# Patient Record
Sex: FEMALE | Race: WHITE | Employment: FULL TIME | URBAN - METROPOLITAN AREA
[De-identification: names, ages, dates, MRNs, and addresses within clinical notes are randomized per-mention and may not be internally consistent; named-entity substitution may affect disease eponyms.]

---

## 2017-01-19 ENCOUNTER — TELEPHONE (OUTPATIENT)
Dept: ENDOCRINOLOGY | Facility: CLINIC | Age: 57
End: 2017-01-19

## 2017-01-19 DIAGNOSIS — E11.9 TYPE 2 DIABETES MELLITUS (H): Primary | ICD-10-CM

## 2017-01-19 NOTE — TELEPHONE ENCOUNTER
[facility] Call Center    Phone Message    Name of Caller: Lorrie    Phone Number: Cell number on file:    Telephone Information:   Mobile [phone]       Best time to return call: any    May a detailed message be left on voicemail: yes    Relation to patient: Self    Reason for Call: Other: Patient is calling stating that she needs a refill on her Accucheck Marisela test strips. Patient states she has test strips but they  2016. Patient is requesting new test strips to be sent to [facility]. Please advise,     Action Taken: Message routed to:  Adult Clinics: Endocrinology p [phone]

## 2017-01-19 NOTE — TELEPHONE ENCOUNTER
Confirmed with patient 2-3 times daily testing.     Updated prescription sent to pharmacy.      Janet Pope RN  Endocrine Care Coordinator  [facility]

## 2017-01-23 ENCOUNTER — OFFICE VISIT (OUTPATIENT)
Dept: ENDOCRINOLOGY | Facility: CLINIC | Age: 57
End: 2017-01-23
Payer: COMMERCIAL

## 2017-01-23 VITALS
HEIGHT: 61 IN | HEART RATE: 114 BPM | SYSTOLIC BLOOD PRESSURE: 137 MMHG | WEIGHT: 129.85 LBS | BODY MASS INDEX: 24.52 KG/M2 | DIASTOLIC BLOOD PRESSURE: 89 MMHG

## 2017-01-23 DIAGNOSIS — E11.3299 TYPE 2 DIABETES MELLITUS WITH MILD NONPROLIFERATIVE RETINOPATHY WITHOUT MACULAR EDEMA, WITHOUT LONG-TERM CURRENT USE OF INSULIN, UNSPECIFIED LATERALITY (H): Primary | ICD-10-CM

## 2017-01-23 LAB — HBA1C MFR BLD: 8.4 % (ref 0–5.7)

## 2017-01-23 PROCEDURE — 99214 OFFICE O/P EST MOD 30 MIN: CPT | Performed by: INTERNAL MEDICINE

## 2017-01-23 PROCEDURE — 83036 HEMOGLOBIN GLYCOSYLATED A1C: CPT | Performed by: INTERNAL MEDICINE

## 2017-01-23 PROCEDURE — 36415 COLL VENOUS BLD VENIPUNCTURE: CPT | Performed by: INTERNAL MEDICINE

## 2017-01-23 RX ORDER — PHENTERMINE HYDROCHLORIDE 37.5 MG/1
18.75 TABLET ORAL
Qty: 30 TABLET | Refills: 2 | Status: SHIPPED | OUTPATIENT
Start: 2017-01-23 | End: 2017-01-23

## 2017-01-23 RX ORDER — PHENTERMINE HYDROCHLORIDE 37.5 MG/1
18.75 TABLET ORAL
Qty: 30 TABLET | Refills: 2 | Status: SHIPPED | OUTPATIENT
Start: 2017-01-23 | End: 2017-07-25

## 2017-01-23 NOTE — PATIENT INSTRUCTIONS
- reduce topiramate by 1 pill every week  - please focus on diet  - continue with metformin 100 mg twice a day and Farxiga 10 mg daily  - continue phentermine 18.75 daily  - see me in 3 months    If you have any questions, please do not hesitate to call [facility] at [phone] and ask for Endocrinology clinic.    Sincerely,    Froy Washington MD  Endocrinology        Sending blood sugars to your provider at [place]:  We want to help you with your diabetes management, which often requires frequent adjustments to your therapy. For your convenience, we have several ways to send your blood sugars to your doctor for review.    - Send message directly to your doctor through My Chart.  Please ask the rooming staff if you would like to sign up for My Chart.  This is a fast and confidential way to send your information and communicate directly with your provider.   - Record readings and fax to [phone].  We have a template for you to use for your convenience.  - If you have a Medtronic pump, upload to [employer] and notify your provider of your username and password.   - Stop by the clinic with your meter for download.   - My Chart or call Kinjal Irving, Diabetes Educator at [phone]  - Call the clinic and speak to one of the endocrine nurses to relay information on the telephone.  Miriam Gonzales, or Kiki at [phone].   -    Please call the on-call Endocrinologist at the [facility] for after       hours/weekend needs at [phone] Option #4.    Please note that you do not need to FAST if you are just having an A1C drawn. Please remember to ALWAYS bring your glucose meter with to your appointment. This data is very important for the management of your care.    Thank you!  Your [facility] Diabetes Care Team

## 2017-01-23 NOTE — NURSING NOTE
"Lorrie Blake's goals for this visit include: Follow up Diabetes  She requests these members of her care team be copied on today's visit information: NO    PCP: [facility]    Referring Provider:  No referring provider defined for this encounter.    Chief Complaint   Patient presents with     Diabetes       Initial Ht 1.549 m (5' 1\")  Wt 58.9 kg (129 lb 13.6 oz)  BMI 24.55 kg/m2 Estimated body mass index is 24.55 kg/(m^2) as calculated from the following:    Height as of this encounter: 1.549 m (5' 1\").    Weight as of this encounter: 58.9 kg (129 lb 13.6 oz).  BP completed using cuff size: regular    Do you need any medication refills at today's visit? NO    "

## 2017-01-23 NOTE — MR AVS SNAPSHOT
After Visit Summary   1/23/2017    Lorrie Blake    MRN: [id number]           Patient Information     Date Of Birth          1960        Visit Information        Provider Department      1/23/2017 2:30 PM Froy Washington MD; MG ENDO NURSE [facility]        Today's Diagnoses     Type 2 diabetes mellitus with mild nonproliferative retinopathy without macular edema, without long-term current use of insulin, unspecified laterality    -  1     Type 2 diabetes mellitus with mild nonproliferative diabetic retinopathy without macular edema (H)           Care Instructions    - reduce topiramate by 1 pill every week  - please focus on diet  - continue with metformin 100 mg twice a day and Farxiga 10 mg daily  - continue phentermine 18.75 daily  - see me in 3 months    If you have any questions, please do not hesitate to call [facility] at [phone] and ask for Endocrinology clinic.    Sincerely,    Froy Washington MD  Endocrinology        Sending blood sugars to your provider at [facility]:  We want to help you with your diabetes management, which often requires frequent adjustments to your therapy. For your convenience, we have several ways to send your blood sugars to your doctor for review.    - Send message directly to your doctor through My Chart.  Please ask the rooming staff if you would like to sign up for My Chart.  This is a fast and confidential way to send your information and communicate directly with your provider.   - Record readings and fax to [phone].  We have a template for you to use for your convenience.  - If you have a Medtronic pump, upload to [employer] and notify your provider of your username and password.   - Stop by the clinic with your meter for download.   - My Chart or call Kinjal Irving, Diabetes Educator at [phone]  - Call the clinic and speak to one of the endocrine nurses to relay information on  the telephone.  Miriam Gonzales or Marta at [phone].   -    Please call the on-call Endocrinologist at the [facility] for after       hours/weekend needs at [phone] Option #4.    Please note that you do not need to FAST if you are just having an A1C drawn. Please remember to ALWAYS bring your glucose meter with to your appointment. This data is very important for the management of your care.    Thank you!  Your [place] Diabetes Care Team              Follow-ups after your visit        Your next 10 appointments already scheduled     May 01, 2017  2:50 PM   Return Visit with Froy Washington MD, [facility] END[facility] NURSE   [facility]    [address] [place] [id number] [phone]              Who to contact     If you have questions or need follow up information about today's clinic visit or your schedule please contact [facility] directly at [phone].  Normal or non-critical lab and imaging results will be communicated to you by [employer], letter or phone within 4 business days after the clinic has received the results. If you do not hear from us within 7 days, please contact the clinic through [employer] or phone. If you have a critical or abnormal lab result, we will notify you by phone as soon as possible.  Submit refill requests through [employer] or call your pharmacy and they will forward the refill request to us. Please allow 3 business days for your refill to be completed.          Additional Information About Your Visit        SharePlow Information     [employer] is an electronic gateway that provides easy, online access to your medical records. With SharePlow, you can request a clinic appointment, read your test results, renew a prescription or communicate with your care team.     To sign up for [employer] visit the website at [email or web address]   You will be asked to enter the access code listed below, as well as  "some personal information. Please follow the directions to create your username and password.     Your access code is: [email or web address]  Expires: 2017  3:00 PM     Your access code will  in 90 days. If you need help or a new code, please contact your [facility] or call [phone] for assistance.        Care EveryWhere ID     This is your Care EveryWhere ID. This could be used by other organizations to access your [facility] medical records  [id number]        Your Vitals Were     Pulse Height BMI (Body Mass Index)             114 1.549 m (5' 1\") 24.55 kg/m2          Blood Pressure from Last 3 Encounters:   17 137/89   16 111/79   16 128/88    Weight from Last 3 Encounters:   17 58.9 kg (129 lb 13.6 oz)   16 58.6 kg (129 lb 3 oz)   16 61.859 kg (136 lb 6 oz)              We Performed the Following     Hemoglobin A1c POCT          Today's Medication Changes          These changes are accurate as of: 17  3:00 PM.  If you have any questions, ask your nurse or doctor.               Start taking these medicines.        Dose/Directions    phentermine 37.5 MG tablet   Commonly known as:  ADIPEX-P   Used for:  Type 2 diabetes mellitus with mild nonproliferative retinopathy without macular edema, without long-term current use of insulin, unspecified laterality   Started by:  Froy Washington MD        Dose:  18.75 mg   Take 0.5 tablets (18.75 mg) by mouth every morning (before breakfast)   Quantity:  30 tablet   Refills:  2         Stop taking these medicines if you haven't already. Please contact your care team if you have questions.     topiramate 25 MG tablet   Commonly known as:  TOPAMAX   Stopped by:  Froy Washington MD                Where to get your medicines      Some of these will need a paper prescription and others can be bought over the counter.  Ask your nurse if you have questions.     Bring a paper prescription for " each of these medications    - phentermine 37.5 MG tablet             Primary Care Provider    [facility]       No address on file        Thank you!     Thank you for choosing [facility]  for your care. Our goal is always to provide you with excellent care. Hearing back from our patients is one way we can continue to improve our services. Please take a few minutes to complete the written survey that you may receive in the mail after your visit with us. Thank you!             Your Updated Medication List - Protect others around you: Learn how to safely use, store and throw away your medicines at www.disposemymeds.org.          This list is accurate as of: 1/23/17  3:00 PM.  Always use your most recent med list.                   Brand Name Dispense Instructions for use    ASPIRIN EC PO      Take 81 mg by mouth daily       BIOTIN PO      Take by mouth daily       blood glucose monitoring test strip    no brand specified    100 strip    Use to test blood sugars 3 times daily or as directed. Accu-chek smartview test strips (has Marisela meter)       dapagliflozin 10 MG Tabs tablet    FARXIGA    90 tablet    Take one tablet by mouth daily       LINZESS 290 MCG capsule   Generic drug:  linaclotide      Take 290 mcg by mouth every morning (before breakfast)       melatonin 3 MG tablet      Take 3 mg by mouth nightly as needed for sleep       metFORMIN 500 MG tablet    GLUCOPHAGE    360 tablet    2 tablets with breakfast and 2 tablet with supper       OMEPRAZOLE PO      Take 20 mg by mouth every morning       phentermine 37.5 MG tablet    ADIPEX-P    30 tablet    Take 0.5 tablets (18.75 mg) by mouth every morning (before breakfast)       PROBIOTIC DAILY PO      Take by mouth daily

## 2017-01-23 NOTE — PROGRESS NOTES
Endocrinology Note         Lorrie is a 55 year old female presents today for type 2 diabetes.    HPI  Lorrie Blake is a 55 years old female with hx of uncontrolled type 2 diabetes and retinopathy. Last seen 8/2016.    She has had type 2 diabetes since 1997 and was initially treated with glyburide. She reported that sulfonylurea did not work for her and A1C has been persistenly >14 since 2012. She was also tried on different medications including Byetta, Victoza, Bydureon (it did not improve her glucose and it is expensive for her), insulin (it caused significant hypoglycemia and too expensive). In February 2015, c-peptide was 2.8 and negative insulin antibodies. At that time,she was then started on Farxiga and metformin. She does not want to be on GLP-1 agonist due to financial issue.      Interim history  At last visit, I started her on phentermine 18.75 mg daily to help with weight loss. She said that phentermine worked well in controlled her appetite. Someday, she took whole pill of phentermine instead of half pill. She also takes topiramate 150 mg daily. She continues on Farxiga 10 mg daily and metformin 1000 mg bid. A1C was 7.9 on 8/1/2016. She lost about 7 lbs in 1 month. She does not bring meter today and said that she has not checked it as she should do. She works as a  at [employer] every day.    DM complications:  Retinopathy: exam in 1/2016 -- mild NPDR bilaterally  Nephropathy: negative for microalb in 9/2015  Neuropathy: denied   Aspirin 81 mg daily    Past Medical History  Type 2 diabetes with retinopathy  Schatzki's ring s/p esophageal dilatation    Allergies  Allergies   Allergen Reactions     Ace Inhibitors Itching     Codeine      Medications  Current Outpatient Prescriptions   Medication Sig Dispense Refill     blood glucose monitoring (NO BRAND SPECIFIED) test strip Use to test blood sugars 3 times daily or as directed. Accu-chek smartview test strips (has Marisela meter) 100  strip 3     topiramate (TOPAMAX) 25 MG tablet Take 6 tablets (150 mg) by mouth daily 360 tablet 1     dapagliflozin (FARXIGA) 10 MG TABS tablet Take one tablet by mouth daily 90 tablet 3     phentermine (ADIPEX-P) 37.5 MG tablet Take 0.5 tablets (18.75 mg) by mouth every morning (before breakfast) 30 tablet 2     metFORMIN (GLUCOPHAGE) 500 MG tablet 2 tablets with breakfast and 2 tablet with supper 360 tablet 3     Probiotic Product (PROBIOTIC DAILY PO) Take by mouth daily       GARCINIA CAMBOGIA-CHROMIUM PO Take 1,000 mg by mouth       BIOTIN PO Take by mouth daily       melatonin 3 MG tablet Take 3 mg by mouth nightly as needed for sleep       ASPIRIN EC PO Take 81 mg by mouth daily       OMEPRAZOLE PO Take 20 mg by mouth every morning       Family History  Type 2 diabetes in father, mother and all siblings    Social History  Social History   Substance Use Topics     Smoking status: Never Smoker      Smokeless tobacco: Not on file     Alcohol Use: Not on file       ROS  Constitutional:lost 7 lbs in 1 months  Eyes: no vision change, diplopia or red eyes   Neck: +schatzki ring  Cardiovascular: no chest pain, palpitations  Respiratory: no dyspnea, cough, shortness of breath or wheezing   GI: no nausea, vomiting, diarrhea or constipation, no abdominal pain   : no change in urine, no dysuria  Musculoskeletal: no joint or muscle pain or swelling   Integumentary: no concerning lesions   Neuro: no loss of strength or sensation, no numbness or tingling, no tremor, no dizziness, no headache   Endo: no polyuria or polydipsia, no temperature intolerance   Heme/Lymph: no concerning bumps, no bleeding problems   Allergy: no environmental allergies   Psych: no depression or anxiety, no sleep problems.    Physical Exam  There were no vitals taken for this visit.  There is no weight on file to calculate BMI.  Constitutional: no distress, comfortable, pleasant   Eyes: anicteric  Musculoskeletal: no edema   Skin:  no jaundice    Neurological: normal gait, no tremor on outstretched hands bilaterally  Psychological: appropriate mood     RESULTS  A1C      7.9   8/1/2016  A1C      7.5   3/30/2016  A1C      8.4   12/28/2015  A1C     11.5   9/28/2015    ENDO DIABETES Latest Ref Rng 4/6/2016   CHOLESTEROL <200 mg/dL 226 (H)   LDL CHOLESTEROL, CALCULATED <100 mg/dL 133 (H)   HDL CHOLESTEROL >49 mg/dL 64   NON HDL CHOLESTEROL <130 mg/dL 162 (H)   TRIGLYCERIDES <150 mg/dL 144   MICROALBUMIN URINE     MICROALBUMIN MG/G CR 0 - 25 mg/g Cr    CREATININE 0.52 - 1.04 mg/dL 0.77     ENDO DIABETES Latest Ref Rng 9/28/2015   MICROALBUMIN URINE  <5   MICROALBUMIN MG/G CR 0 - 25 mg/g Cr Unable to calculate due to low value   CREATININE 0.52 - 1.04 mg/dL 0.75     ASSESSMENT:    Lorrie Blake is a 55 years old female with hx of uncontrolled type 2 diabetes and retinopathy.    1) type 2 diabetes with retinopathy:  A1C was 7.9% on 8/1/16.   - I started her on phentermine 1 month ago and she lost 7 lbs.   - continue metofrmin 1000 mg bid and Farxiga 10 mg daily  - she does not want to do injectable medication.      2) DM complications:  Retinopathy: exam in 1/2016 mild NPDR bilaterally  Nephropathy: negative for microalb in 9/2015  Neuropathy: intermittent - likely from topiramate    3) overweight: due to increased sweet craving. Lost 7 lbs in a month after starting on phentermine  continue topiramate 150 mg daily  Continue phentermine 18.75 mg daily    PLAN:   - continue Farxiga 10 mg and metformin 1000 mg bid  - continue topiramate 150 mg daily   - continue phentermine 18.75 mg daily    - RTC in 3 month    Froy Washington MD  Endocrinology  [phone]

## 2017-01-23 NOTE — PROGRESS NOTES
Endocrinology Note         Lorrie is a 56 year old female presents today for type 2 diabetes.    HPI  Lorrie Blake is a 56 years old female with hx of uncontrolled type 2 diabetes and retinopathy. Last seen 9/2016    She has had type 2 diabetes since 1997 and was initially treated with glyburide. She reported that sulfonylurea did not work for her and A1C has been persistenly >14 since 2012. She was also tried on different medications including Byetta, Victoza, Bydureon (it did not improve her glucose and it is expensive for her), insulin (it caused significant hypoglycemia and too expensive). In February 2015, c-peptide was 2.8 and negative insulin antibodies. At that time,she was then started on Farxiga and metformin. She does not want to be on GLP-1 agonist due to financial issue.      Interim history  She stated that she has had problem with her stomach and constipation. She has had bowel movement every 3-4 days and required Linzess.     She is currently on  Farxiga 10 mg daily and metformin 1000 mg bid. A1C is up to 8.4% today. She has not checked fingerstick regularly. She did check blood sugar over the past week with average glucose of 175 (SD 26). She takes phentermine 18.75 mg daily and topiramate 150 mg daily at night. She is very frustrated today that her diabetes is not controlled no matter what she has done. She stated that she has not eaten much. She does not feel hungry in the morning and does not eat past 6 pm. However, she admitted that she craves sweet particularly apple sauce. She exercises almost every day as she works as a  at [employer].    DM complications:  Retinopathy: exam in 1/2016 -- mild NPDR bilaterally  Nephropathy: negative for microalb in 9/2015  Neuropathy: denied   Aspirin 81 mg daily    Past Medical History  Type 2 diabetes with retinopathy  Schatzki's ring s/p esophageal dilatation    Allergies  Allergies   Allergen Reactions     Ace Inhibitors Itching      Codeine      Medications  Current Outpatient Prescriptions   Medication Sig Dispense Refill     linaclotide (LINZESS) 290 MCG capsule Take 290 mcg by mouth every morning (before breakfast)       blood glucose monitoring (NO BRAND SPECIFIED) test strip Use to test blood sugars 3 times daily or as directed. Accu-chek smartview test strips (has Marisela meter) 100 strip 3     topiramate (TOPAMAX) 25 MG tablet Take 6 tablets (150 mg) by mouth daily 360 tablet 1     dapagliflozin (FARXIGA) 10 MG TABS tablet Take one tablet by mouth daily 90 tablet 3     phentermine (ADIPEX-P) 37.5 MG tablet Take 0.5 tablets (18.75 mg) by mouth every morning (before breakfast) 30 tablet 2     metFORMIN (GLUCOPHAGE) 500 MG tablet 2 tablets with breakfast and 2 tablet with supper 360 tablet 3     Probiotic Product (PROBIOTIC DAILY PO) Take by mouth daily       BIOTIN PO Take by mouth daily       melatonin 3 MG tablet Take 3 mg by mouth nightly as needed for sleep       ASPIRIN EC PO Take 81 mg by mouth daily       OMEPRAZOLE PO Take 20 mg by mouth every morning       Family History  Type 2 diabetes in father, mother and all siblings    Social History  Social History   Substance Use Topics     Smoking status: Never Smoker      Smokeless tobacco: Not on file     Alcohol Use: Not on file       ROS  Constitutional: stable weight  Eyes: no vision change, diplopia or red eyes   Neck: +schatzki ring  Cardiovascular: no chest pain, palpitations  Respiratory: no dyspnea, cough, shortness of breath or wheezing   GI: no nausea, vomiting, diarrhea, + constipation, no abdominal pain   : no change in urine, no dysuria  Musculoskeletal: no joint or muscle pain or swelling   Integumentary: no concerning lesions   Neuro: no loss of strength or sensation, no numbness or tingling, no tremor, no dizziness, no headache   Endo: no polyuria or polydipsia, no temperature intolerance   Heme/Lymph: no concerning bumps, no bleeding problems   Allergy: no environmental  "allergies   Psych: +frustrated    Physical Exam  /89 mmHg  Pulse 114  Ht 1.549 m (5' 1\")  Wt 58.9 kg (129 lb 13.6 oz)  BMI 24.55 kg/m2  Body mass index is 24.55 kg/(m^2).  Constitutional: no distress, comfortable, pleasant   Eyes: anicteric  Musculoskeletal: no edema   Skin:  no jaundice   Neurological: normal gait  Psychological: feels frustrated and upset    RESULTS  A1C      8.4   1/23/2017  A1C      7.9   8/1/2016  A1C      7.5   3/30/2016  A1C      8.4   12/28/2015  A1C     11.5   9/28/2015    ENDO DIABETES Latest Ref Rng 4/6/2016   CHOLESTEROL <200 mg/dL 226 (H)   LDL CHOLESTEROL, CALCULATED <100 mg/dL 133 (H)   HDL CHOLESTEROL >49 mg/dL 64   NON HDL CHOLESTEROL <130 mg/dL 162 (H)   TRIGLYCERIDES <150 mg/dL 144   MICROALBUMIN URINE     MICROALBUMIN MG/G CR 0 - 25 mg/g Cr    CREATININE 0.52 - 1.04 mg/dL 0.77     ENDO DIABETES Latest Ref Rng 9/28/2015   MICROALBUMIN URINE  <5   MICROALBUMIN MG/G CR 0 - 25 mg/g Cr Unable to calculate due to low value   CREATININE 0.52 - 1.04 mg/dL 0.75     ASSESSMENT:    Lorrie Blake is a 56 years old female with hx of uncontrolled type 2 diabetes and retinopathy.    1) type 2 diabetes with retinopathy:  A1C today is 8.4% which is up from last time.   - unclear how her diet is although she stated that she has not eaten much. She still craves sweet.  - continue metofrmin 1000 mg bid and Farxiga 10 mg daily for now. If A1c continues to get worse, will add on third medication  - she does not want to do injectable medication.    2) DM complications:  Retinopathy: exam in 1/2016 mild NPDR bilaterally  Nephropathy: negative for microalb in 9/2015  Neuropathy: intermittent - likely from topiramate    3) overweight: due to increased sweet craving. Still craves sweet.   Continue phentermine 18.75 mg daily  Slow tapering down on topiramate due to constipation    PLAN:   - continue Farxiga 10 mg and metformin 1000 mg bid  - continue phentermine 18.75 mg daily  - Slow tapering " down on topiramate due to constipation    - RTC in 3 months    Froy Washington MD  Endocrinology  [phone]

## 2017-03-17 DIAGNOSIS — E11.65 TYPE 2 DIABETES MELLITUS WITH HYPERGLYCEMIA (H): ICD-10-CM

## 2017-04-10 DIAGNOSIS — E11.3299 TYPE 2 DIABETES MELLITUS WITH MILD NONPROLIFERATIVE RETINOPATHY WITHOUT MACULAR EDEMA, WITHOUT LONG-TERM CURRENT USE OF INSULIN, UNSPECIFIED LATERALITY (H): ICD-10-CM

## 2017-04-10 RX ORDER — TOPIRAMATE 25 MG/1
TABLET, FILM COATED ORAL
Qty: 360 TABLET | Refills: 1 | Status: SHIPPED | OUTPATIENT
Start: 2017-04-10 | End: 2017-08-28

## 2017-04-10 NOTE — TELEPHONE ENCOUNTER
Patient confirmed she is currently taking Topiramate 25 mg-4 tablets daily. Reports that constipation is resolving.     Refill completed.    Miriam Black LPN  Adult Endocrinology  [facility]

## 2017-06-27 ENCOUNTER — TRANSFERRED RECORDS (OUTPATIENT)
Dept: HEALTH INFORMATION MANAGEMENT | Facility: CLINIC | Age: 57
End: 2017-06-27

## 2017-07-03 ENCOUNTER — TELEPHONE (OUTPATIENT)
Dept: ENDOCRINOLOGY | Facility: CLINIC | Age: 57
End: 2017-07-03

## 2017-07-03 NOTE — TELEPHONE ENCOUNTER
Received medical record from eye clinic, [facility], Dr.Charles Zavala    The patient has mild non proliferative diabetic retinopathy  Dry eye  Vitreous syneuresis  Cataract  Blepharospasm  Presbyopia    Froy Washington MD    Division of Diabetes and Endocrinology  Department of Medicine  [phone]

## 2017-07-25 DIAGNOSIS — E11.3299 TYPE 2 DIABETES MELLITUS WITH MILD NONPROLIFERATIVE RETINOPATHY WITHOUT MACULAR EDEMA, WITHOUT LONG-TERM CURRENT USE OF INSULIN, UNSPECIFIED LATERALITY (H): ICD-10-CM

## 2017-07-25 RX ORDER — PHENTERMINE HYDROCHLORIDE 37.5 MG/1
18.75 TABLET ORAL
Qty: 30 TABLET | Refills: 1 | Status: SHIPPED | OUTPATIENT
Start: 2017-07-25 | End: 2017-08-28

## 2017-07-25 NOTE — TELEPHONE ENCOUNTER
Last Office Visit: 1/23/17  Future Appt: 8/28/17    Medication not on Endocrine Refill Protocol. Will route to Dr. Washington for review.    Miriam Black LPN  Adult Endocrinology  [facility]

## 2017-07-28 NOTE — TELEPHONE ENCOUNTER
Refill completed by Dr. Washington.    Miriam Black LPN  Adult Endocrinology  [facility]

## 2017-07-31 ENCOUNTER — TELEPHONE (OUTPATIENT)
Dept: ENDOCRINOLOGY | Facility: CLINIC | Age: 57
End: 2017-07-31

## 2017-08-02 NOTE — TELEPHONE ENCOUNTER
SPOKE WITH PHARMACIST, SHE STATED NOT COVERED BY INSURANCE.  PATIENT PAID CASH.  PHARMACY BENEFITS TERMED AS OF 7/31/2017.

## 2017-08-28 ENCOUNTER — OFFICE VISIT (OUTPATIENT)
Dept: ENDOCRINOLOGY | Facility: CLINIC | Age: 57
End: 2017-08-28
Payer: COMMERCIAL

## 2017-08-28 VITALS
SYSTOLIC BLOOD PRESSURE: 147 MMHG | HEART RATE: 97 BPM | HEIGHT: 60 IN | BODY MASS INDEX: 25.56 KG/M2 | DIASTOLIC BLOOD PRESSURE: 104 MMHG | WEIGHT: 130.2 LBS

## 2017-08-28 DIAGNOSIS — E11.3299 TYPE 2 DIABETES MELLITUS WITH MILD NONPROLIFERATIVE RETINOPATHY, MACULAR EDEMA PRESENCE UNSPECIFIED, UNSPECIFIED LATERALITY, UNSPECIFIED LONG TERM INSULIN USE STATUS: ICD-10-CM

## 2017-08-28 DIAGNOSIS — E66.3 OVERWEIGHT (BMI 25.0-29.9): Primary | ICD-10-CM

## 2017-08-28 LAB
CHOLEST SERPL-MCNC: 219 MG/DL
CREAT SERPL-MCNC: 0.66 MG/DL (ref 0.52–1.04)
CREAT UR-MCNC: 70 MG/DL
GFR SERPL CREATININE-BSD FRML MDRD: >90 ML/MIN/1.7M2
HBA1C MFR BLD: 9.2 % (ref 0–5.7)
HDLC SERPL-MCNC: 55 MG/DL
LDLC SERPL CALC-MCNC: 127 MG/DL
MICROALBUMIN UR-MCNC: 7 MG/L
MICROALBUMIN/CREAT UR: 10.46 MG/G CR (ref 0–25)
NONHDLC SERPL-MCNC: 164 MG/DL
TRIGL SERPL-MCNC: 183 MG/DL

## 2017-08-28 PROCEDURE — 83036 HEMOGLOBIN GLYCOSYLATED A1C: CPT | Performed by: INTERNAL MEDICINE

## 2017-08-28 PROCEDURE — 82043 UR ALBUMIN QUANTITATIVE: CPT | Performed by: INTERNAL MEDICINE

## 2017-08-28 PROCEDURE — 36415 COLL VENOUS BLD VENIPUNCTURE: CPT | Performed by: INTERNAL MEDICINE

## 2017-08-28 PROCEDURE — 99214 OFFICE O/P EST MOD 30 MIN: CPT | Performed by: INTERNAL MEDICINE

## 2017-08-28 PROCEDURE — 80061 LIPID PANEL: CPT | Performed by: INTERNAL MEDICINE

## 2017-08-28 PROCEDURE — 82565 ASSAY OF CREATININE: CPT | Performed by: INTERNAL MEDICINE

## 2017-08-28 RX ORDER — GLIPIZIDE 10 MG/1
10 TABLET, FILM COATED, EXTENDED RELEASE ORAL DAILY
Qty: 90 TABLET | Refills: 3 | Status: SHIPPED | OUTPATIENT
Start: 2017-08-28 | End: 2018-01-22

## 2017-08-28 RX ORDER — TOPIRAMATE 100 MG/1
100 TABLET, FILM COATED ORAL DAILY
Qty: 90 TABLET | Refills: 2 | Status: SHIPPED | OUTPATIENT
Start: 2017-08-28 | End: 2018-01-22

## 2017-08-28 NOTE — PATIENT INSTRUCTIONS
Sending blood sugars to your provider at [facility]:  We want to help you with your diabetes management, which often requires frequent adjustments to your therapy. For your convenience, we have several ways to send your blood sugars to your doctor for review.    - Send message directly to your doctor through My Chart.  Please ask the rooming staff if you would like to sign up for My Chart.  This is a fast and confidential way to send your information and communicate directly with your provider.   - Record readings and fax to [phone].  We have a template for you to use for your convenience.  - If you have a Medtronic pump, upload to [facility] and notify your provider of your username and password.   - Stop by the clinic with your meter for download.   - My Chart or call Kinjal Irving, Diabetes Educator at [phone]  - Call the clinic and speak to one of the endocrine nurses to relay information on the telephone.  Miriam Gonzales, or Kiki at [phone].   -    Please call the on-call Endocrinologist at the [facility] for after       hours/weekend needs at [phone] Option #4.    Please note that you do not need to FAST if you are just having an A1C drawn. Please remember to ALWAYS bring your glucose meter with to your appointment. This data is very important for the management of your care.    Thank you!  Your [facility] Team

## 2017-08-28 NOTE — LETTER
Patient:  Lorrie Blake  :   1960  MRN:     [id number]        Ms.Cheryl LANDEN Blake  [address]  [place] [address]        2017    Dear ,    We are writing to inform you of your test results.    Your diabetes control is getting worse. Please make an afford to control diet and increase exercise level. Please let me know how glucose is after starting on new medications.    Cholesterol is higher that it was last year. Please try to control it with low cholesterol diet. I will repeat it again later in a next few months.     Kidney and urine protein test are normal.    Resulted Orders   Hemoglobin A1c POCT   Result Value Ref Range    Hemoglobin A1C 9.2 %   Albumin Random Urine Quantitative with Creat Ratio   Result Value Ref Range    Creatinine Urine 70 mg/dL    Albumin Urine mg/L 7 mg/L    Albumin Urine mg/g Cr 10.46 0 - 25 mg/g Cr   Creatinine   Result Value Ref Range    Creatinine 0.66 0.52 - 1.04 mg/dL    GFR Estimate >90 >60 mL/min/1.7m2      Comment:      Non  GFR Calc    GFR Estimate If Black >90 >60 mL/min/1.7m2      Comment:      African American GFR Calc   Lipid Profile   Result Value Ref Range    Cholesterol 219 (H) <200 mg/dL      Comment:      Desirable:       <200 mg/dl    Triglycerides 183 (H) <150 mg/dL      Comment:      Borderline high:  150-199 mg/dl  High:             200-499 mg/dl  Very high:       >499 mg/dl  Non Fasting      HDL Cholesterol 55 >49 mg/dL    LDL Cholesterol Calculated 127 (H) <100 mg/dL      Comment:      Above desirable:  100-129 mg/dl  Borderline High:  130-159 mg/dL  High:             160-189 mg/dL  Very high:       >189 mg/dl      Non HDL Cholesterol 164 (H) <130 mg/dL      Comment:      Above Desirable:  130-159 mg/dl  Borderline high:  160-189 mg/dl  High:             190-219 mg/dl  Very high:       >219 mg/dl         If you have any questions, please do not hesitate to call [facility] at [phone] and ask for Endocrinology clinic.    Sincerely,    Froy Washington MD  Endocrinology        [phone]  1960

## 2017-08-28 NOTE — PROGRESS NOTES
Endocrinology Note         Lorrie is a 56 year old female presents today for type 2 diabetes.    HPI  Lorrie Blake is a 56 years old female with hx of uncontrolled type 2 diabetes and retinopathy. Last seen 1/2017    She has had type 2 diabetes since 1997 and was initially treated with glyburide. She reported that sulfonylurea did not work for her and A1C has been persistenly >14 since 2012. She was also tried on different medications including Byetta, Victoza, Bydureon (it did not improve her glucose and it is expensive for her), insulin (it caused significant hypoglycemia and too expensive). In February 2015, c-peptide was 2.8 and negative insulin antibodies. At that time,she was then started on Farxiga and metformin. She does not want to be on GLP-1 agonist due to financial issue.      Interim history  She has not come for follow up for a while. She said that she has been busy with a lot of stresses in her family. There has been a lot of deaths in her family. 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 Her daughter left for a college which is emotional for her.    She has not been taking care of diabetes. She has not checked blood glucose. A1C today is up to 9.2%. She is currently on  Farxiga 10 mg daily and metformin 1000 mg bid. .She also takes phentermine 18.75 mg daily and topiramate 100 mg daily at night. She is very frustrated today due to a lot of stresses that she could not control and type 2 diabetes.  She stated that she has not eaten much but has not eaten regularly. She said she craves sweet particularly apple sauce. She exercises 2 times per week because she is a  at [employer].    She stated that she has had problem with her stomach and constipation. She has had bowel movement every 3-4 days and required Linzess.     DM complications:  Retinopathy: exam in 6/2017-- mild NPDR bilaterally  Nephropathy:  negative for microalb in 9/2015  Neuropathy: denied   Aspirin 81 mg daily    Past Medical History  Type 2 diabetes with retinopathy  Schatzki's ring s/p esophageal dilatation    Allergies  Allergies   Allergen Reactions     Ace Inhibitors Itching     Codeine      Medications  Current Outpatient Prescriptions   Medication Sig Dispense Refill     phentermine (ADIPEX-P) 37.5 MG tablet Take 0.5 tablets (18.75 mg) by mouth every morning (before breakfast) 30 tablet 1     topiramate (TOPAMAX) 25 MG tablet Take 4 tablets (100 mg) by mouth daily 360 tablet 1     metFORMIN (GLUCOPHAGE) 500 MG tablet 2 tablets with breakfast and 2 tablet with supper 360 tablet 3     linaclotide (LINZESS) 290 MCG capsule Take 290 mcg by mouth every morning (before breakfast)       blood glucose monitoring (NO BRAND SPECIFIED) test strip Use to test blood sugars 3 times daily or as directed. Accu-chek smartview test strips (has Marisela meter) 100 strip 3     dapagliflozin (FARXIGA) 10 MG TABS tablet Take one tablet by mouth daily 90 tablet 3     Probiotic Product (PROBIOTIC DAILY PO) Take by mouth daily       BIOTIN PO Take by mouth daily       melatonin 3 MG tablet Take 3 mg by mouth nightly as needed for sleep       ASPIRIN EC PO Take 81 mg by mouth daily       OMEPRAZOLE PO Take 20 mg by mouth every morning       Family History  Type 2 diabetes in father, mother and all siblings    Social History  Social History   Substance Use Topics     Smoking status: Never Smoker     Smokeless tobacco: Never Used     Alcohol use Not on file       ROS  Constitutional: stable weight  Eyes: no vision change, diplopia or red eyes   Neck: +schatzki ring  Cardiovascular: no chest pain, palpitations  Respiratory: no dyspnea, cough, shortness of breath or wheezing   GI: no nausea, vomiting, diarrhea, + constipation, no abdominal pain   : no change in urine, no dysuria  Musculoskeletal: no joint or muscle pain or swelling   Integumentary: no concerning lesions  "  Neuro: no loss of strength or sensation, no numbness or tingling, no tremor, no dizziness, no headache   Endo: no polyuria or polydipsia, no temperature intolerance   Heme/Lymph: no concerning bumps, no bleeding problems   Allergy: no environmental allergies   Psych: +frustrated due to a lot of stresses and uncontrolled diabetes.    Physical Exam  BP (!) 145/107  Pulse 100  Ht 1.53 m (5' 0.25\")  Wt 59.1 kg (130 lb 3.2 oz)  BMI 25.22 kg/m2  Body mass index is 25.22 kg/(m^2).  Constitutional: no distress, comfortable, pleasant   Eyes: anicteric  Musculoskeletal: no edema   Skin:  no jaundice   Neurological: normal gait  Psychological: feels frustrated and upset    RESULTS  Lab Results   Component Value Date    A1C 9.2 08/28/2017    A1C 8.4 01/23/2017    A1C 7.9 08/01/2016    A1C 7.5 03/30/2016    A1C 8.4 12/28/2015       ENDO DIABETES Latest Ref Rng 4/6/2016   CHOLESTEROL <200 mg/dL 226 (H)   LDL CHOLESTEROL, CALCULATED <100 mg/dL 133 (H)   HDL CHOLESTEROL >49 mg/dL 64   NON HDL CHOLESTEROL <130 mg/dL 162 (H)   TRIGLYCERIDES <150 mg/dL 144   MICROALBUMIN URINE     MICROALBUMIN MG/G CR 0 - 25 mg/g Cr    CREATININE 0.52 - 1.04 mg/dL 0.77     ENDO DIABETES Latest Ref Rng 9/28/2015   MICROALBUMIN URINE  <5   MICROALBUMIN MG/G CR 0 - 25 mg/g Cr Unable to calculate due to low value   CREATININE 0.52 - 1.04 mg/dL 0.75     ASSESSMENT:    Lorrie Blake is a 56 years old female with hx of uncontrolled type 2 diabetes and retinopathy who is here for follow up.    1) type 2 diabetes with retinopathy:  A1C today is 9.2 which is up from last time.   - unclear how her diet is although she stated that she has not eaten much. She still craves sweet.  - she is under a lot of stresses.  - discussed options of medications. Given increased A1C, I will add glipizide XR 10 mg daily. She will continue with  metformin 1000 mg bid and Farxiga 10 mg daily.  - she does not want to do injectable medication due to cost.    2) DM " complications:  Retinopathy: exam in 6/2017-- mild NPDR bilaterally  Nephropathy: negative for microalb in 9/2015  Neuropathy: intermittent - likely from topiramate    3) overweight: due to increased sweet craving. Still craves sweet. Irregular eating habit.  -Discontinue phentermine due to hypertension  -Continue topiramate 100 mg daily.    PLAN:   - add glipizide XR 10 mg daily  - continue Farxiga 10 mg and metformin 1000 mg bid  - continue topiramate 100 mg daily  - emphasize her to check blood glucose regularly.  - lab today for urine microalbumin, Cr, lipid profile    - RTC in 2 months to see CDE  - RTC 4-5 months to see me    Froy Washington MD  Endocrinology  [phone]

## 2017-10-31 ENCOUNTER — OFFICE VISIT (OUTPATIENT)
Dept: NURSING | Facility: CLINIC | Age: 57
End: 2017-10-31
Payer: COMMERCIAL

## 2017-10-31 DIAGNOSIS — E11.9 DIABETES MELLITUS WITHOUT COMPLICATION (H): Primary | ICD-10-CM

## 2017-10-31 PROCEDURE — G0108 DIAB MANAGE TRN  PER INDIV: HCPCS

## 2017-10-31 NOTE — PATIENT INSTRUCTIONS
1. Check bg once every other day, alternating between fasting and 2 hours post-dinner or at bedtime.   2. Check bg average in meter once every couple of weeks. Goal bg average = < 150.   3. Take 5-10 minutes daily and give this time to yourself to relax and have quiet time.   4. Purchase or get from library a book called: The [place], By Miguel Ferro.   5. Call Kinjal at [place] if any questions or concerns arise.     Kinjal Irving RN, BSN, CDE   [facility]

## 2017-10-31 NOTE — MR AVS SNAPSHOT
After Visit Summary   10/31/2017    Lorrie Blake    MRN: [id number]           Patient Information     Date Of Birth          1960        Visit Information        Provider Department      10/31/2017 3:00 PM Provider, [facility]        Care Instructions    1. Check bg once every other day, alternating between fasting and 2 hours post-dinner or at bedtime.   2. Check bg average in meter once every couple of weeks. Goal bg average = < 150.   3. Take 5-10 minutes daily and give this time to yourself to relax and have quiet time.   4. Purchase or get from library a book called: The [place] [facility] [place], By Miguel Ferro.   5. Call Kinjal at [place] if any questions or concerns arise.     Kinjal Irving RN, BSN, CDE   [facility]          Follow-ups after your visit        Your next 10 appointments already scheduled     Jan 22, 2018  3:15 PM CST   Return Visit with Froy Washington MD, MG ENDO NURSE   [facility]    [address] [place] [id number] [phone]              Who to contact     If you have questions or need follow up information about today's clinic visit or your schedule please contact [facility] directly at [phone].  Normal or non-critical lab and imaging results will be communicated to you by MyChart, letter or phone within 4 business days after the clinic has received the results. If you do not hear from us within 7 days, please contact the clinic through MyChart or phone. If you have a critical or abnormal lab result, we will notify you by phone as soon as possible.  Submit refill requests through [employer] or call your pharmacy and they will forward the refill request to us. Please allow 3 business days for your refill to be completed.          Additional Information About Your Visit        MyChart Information     [employer] is an  electronic gateway that provides easy, online access to your medical records. With [employer], you can request a clinic appointment, read your test results, renew a prescription or communicate with your care team.     To sign up for [employer] visit the website at [email or web address]   You will be asked to enter the access code listed below, as well as some personal information. Please follow the directions to create your username and password.     Your access code is: [email or web address]  Expires: 2017  4:11 PM     Your access code will  in 90 days. If you need help or a new code, please contact your [facility] or call [phone] for assistance.        Care EveryWhere ID     This is your Care EveryWhere ID. This could be used by other organizations to access your [facility] medical records  [id number]         Blood Pressure from Last 3 Encounters:   17 (!) 147/104   17 137/89   16 111/79    Weight from Last 3 Encounters:   17 59.1 kg (130 lb 3.2 oz)   17 58.9 kg (129 lb 13.6 oz)   16 58.6 kg (129 lb 3 oz)              Today, you had the following     No orders found for display       Primary Care Provider    [facility]       No address on file        Equal Access to Services     DONNA WU : Hadii aad ku hadasho Somohsenali, waaxda luqadaha, qaybta kaalmada adeegyada, jose roberto blount . So [facility] [phone].    ATENCIÓN: Si habla español, tiene a thornton disposición servicios gratuitos de asistencia lingüística. Llame al [phone].    We comply with applicable federal civil rights laws and [place] laws. We do not discriminate on the basis of race, color, national origin, age, disability, sex, sexual orientation, or gender identity.            Thank you!     Thank you for choosing [facility]  for your care. Our goal is always to provide you with excellent care. Hearing back from  our patients is one way we can continue to improve our services. Please take a few minutes to complete the written survey that you may receive in the mail after your visit with us. Thank you!             Your Updated Medication List - Protect others around you: Learn how to safely use, store and throw away your medicines at www.disposemymeds.org.          This list is accurate as of: 10/31/17  4:17 PM.  Always use your most recent med list.                   Brand Name Dispense Instructions for use Diagnosis    ASPIRIN EC PO      Take 81 mg by mouth daily        BIOTIN PO      Take by mouth daily    Type 2 diabetes mellitus with mild nonproliferative diabetic retinopathy without macular edema (H)       blood glucose monitoring test strip    no brand specified    100 strip    Use to test blood sugars 3 times daily or as directed. Accu-chek smartview test strips (has Marisela meter)    Type 2 diabetes mellitus (H)       dapagliflozin 10 MG Tabs tablet    FARXIGA    90 tablet    Take one tablet by mouth daily    Type 2 diabetes mellitus with mild nonproliferative diabetic retinopathy without macular edema (H)       glipiZIDE 10 MG 24 hr tablet    GLUCOTROL XL    90 tablet    Take 1 tablet (10 mg) by mouth daily    Overweight (BMI 25.0-29.9)       LINZESS 290 MCG capsule   Generic drug:  linaclotide      Take 290 mcg by mouth every morning (before breakfast)        melatonin 3 MG tablet      Take 3 mg by mouth nightly as needed for sleep        METAMUCIL PO      Take by mouth daily    Overweight (BMI 25.0-29.9)       metFORMIN 500 MG tablet    GLUCOPHAGE    360 tablet    2 tablets with breakfast and 2 tablet with supper    Type 2 diabetes mellitus with hyperglycemia (H)       MULTIVITAMINS PO      Take by mouth daily    Overweight (BMI 25.0-29.9)       OMEPRAZOLE PO      Take 40 mg by mouth every morning        PROBIOTIC DAILY PO      Take by mouth daily    Type 2 diabetes mellitus with mild nonproliferative diabetic  retinopathy without macular edema (H)       topiramate 100 MG tablet    TOPAMAX    90 tablet    Take 1 tablet (100 mg) by mouth daily    Overweight (BMI 25.0-29.9)

## 2017-11-06 NOTE — PROGRESS NOTES
"  Diabetes Self Management Training: Individual Review Visit    Lorrie Blake presents today for education and evaluation of glucose control related to Type 2 diabetes.    She is accompanied by self    Patient's diabetes management related comments/concerns: none    Patient's emotional response to diabetes: expresses readiness to learn and anxiety    Patient would like this visit to be focused around the following diabetes-related behaviors and goals: none stated    ASSESSMENT:  Patient diagnosed with type 2 diabetes in 1997. Last seen by Dr Mcduffie on 08/28/17. A1c: 9.2%. Wt: 130 lbs. Weight today was 134 lbs. Patient frustrated by this. Pt c/o \"lots of stress\" at home: financial stress, daughter left for college in Aug,  currently out of work. Spouse does not help at home. 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 Typical work day is about 9 hours log. Patient in therapy, 1 day a week. States, does not feel like it is helping. Depressed. Declines recommendation to go on anti-depressants.     Current Diabetes Management per Patient:  Taking diabetes medications? Yes: see below.    Diabetes Medication(s)     Biguanides Sig    metFORMIN (GLUCOPHAGE) 500 MG tablet 2 tablets with breakfast and 2 tablet with supper    Sodium-Glucose Co-Transporter 2 (SGLT2) Inhibitors Sig    dapagliflozin (FARXIGA) 10 MG TABS tablet Take one tablet by mouth daily    Sulfonylureas Sig    glipiZIDE (GLUCOTROL XL) 10 MG 24 hr tablet Take 1 tablet (10 mg) by mouth daily         Patient glucose self monitoring as follows: rarely.   BG meter: Accu-Chek Kady meter  BG results: Patient started to check bg on 10/27, once a day. Ave bg per meter download was 111. Bg range: .        BG values are: Not in goal but improving.   Patient's most recent   Lab Results   Component Value Date    A1C 9.2 08/28/2017    is not meeting goal of < 6.5%    Physical Activity:    Personal " " 2 days a week at [facility].     Vitals:  There were no vitals taken for this visit.  Estimated body mass index is 25.22 kg/(m^2) as calculated from the following:    Height as of 8/28/17: 1.53 m (5' 0.25\").    Weight as of 8/28/17: 59.1 kg (130 lb 3.2 oz).   Last 3 BP:   BP Readings from Last 3 Encounters:   08/28/17 (!) 147/104   01/23/17 137/89   09/12/16 111/79       History   Smoking Status     Never Smoker   Smokeless Tobacco     Never Used       Labs:  Lab Results   Component Value Date    A1C 9.2 08/28/2017     No results found for: GLC  Lab Results   Component Value Date     08/28/2017     HDL Cholesterol   Date Value Ref Range Status   08/28/2017 55 >49 mg/dL Final   ]  GFR Estimate   Date Value Ref Range Status   08/28/2017 >90 >60 mL/min/1.7m2 Final     Comment:     Non  GFR Calc     GFR Estimate If Black   Date Value Ref Range Status   08/28/2017 >90 >60 mL/min/1.7m2 Final     Comment:      GFR Calc     Lab Results   Component Value Date    CR 0.66 08/28/2017     No results found for: MICROALBUMIN    Socio/Economic Considerations:    Support system: patient lives with  but he is not supportive of pt.     Health Beliefs and Attitudes:   Patient Activation Measure Survey Score:  No flowsheet data found.    Stage of Change: CONTEMPLATION (Considering change and yet undecided)      Diabetes knowledge and skills assessment:     Patient is knowledgeable in diabetes management concepts related to: Healthy Eating, Being Active, Monitoring, Taking Medication, Problem Solving and Reducing Risks    Patient needs further education on the following diabetes management concepts: Healthy Coping    Barriers to Learning Assessment: No Barriers identified    Based on learning assessment above, most appropriate setting for further diabetes education would be: Individual setting.    INTERVENTION:   Education provided today on:  Healthy Coping: patient is seeking " "professional counseling for stress and depression. Declines starting anti-depressant meds.   When encouraged to allow herself quiet time or \"me\" time, patient states she cannot do this. States has too many responsibilities at home.     Opportunities for ongoing education and support in diabetes-self management were discussed.    Pt verbalized understanding of concepts discussed and recommendations provided today.       Education Materials Provided:  No new materials provided today    PLAN:.  Check bg once every other day, alternating between fasting and 2 hours post dinner.   Take 5-10 minutes daily for \"me\" time.   No changes made with mediation regimen. Patient's bg levels have responded well since adding glipizide to metformin and farxiga therapy.      FOLLOW-UP:  Keep f/u appt with Endo in Jan.     Ongoing plan for education and support: patient declines return appt with cde. Pt encouraged to call cde with questions or concerns should they arise.     Time Spent: 60 minutes  Encounter Type: Individual    Kinjal Irving RN, BSN, CDE   [facility]  "

## 2017-11-08 DIAGNOSIS — E11.3299 TYPE 2 DIABETES MELLITUS WITH MILD NONPROLIFERATIVE DIABETIC RETINOPATHY WITHOUT MACULAR EDEMA (H): ICD-10-CM

## 2017-11-08 RX ORDER — DAPAGLIFLOZIN 10 MG/1
TABLET, FILM COATED ORAL
Qty: 90 TABLET | Refills: 3 | Status: SHIPPED | OUTPATIENT
Start: 2017-11-08 | End: 2018-01-22

## 2018-01-22 ENCOUNTER — OFFICE VISIT (OUTPATIENT)
Dept: ENDOCRINOLOGY | Facility: CLINIC | Age: 58
End: 2018-01-22
Payer: COMMERCIAL

## 2018-01-22 VITALS
HEIGHT: 60 IN | BODY MASS INDEX: 27.12 KG/M2 | WEIGHT: 138.13 LBS | OXYGEN SATURATION: 99 % | TEMPERATURE: 98.2 F | DIASTOLIC BLOOD PRESSURE: 108 MMHG | SYSTOLIC BLOOD PRESSURE: 158 MMHG | HEART RATE: 96 BPM

## 2018-01-22 DIAGNOSIS — E11.3299 TYPE 2 DIABETES MELLITUS WITH MILD NONPROLIFERATIVE RETINOPATHY WITHOUT MACULAR EDEMA, WITHOUT LONG-TERM CURRENT USE OF INSULIN, UNSPECIFIED LATERALITY (H): ICD-10-CM

## 2018-01-22 DIAGNOSIS — R53.83 FATIGUE, UNSPECIFIED TYPE: Primary | ICD-10-CM

## 2018-01-22 DIAGNOSIS — E66.3 OVERWEIGHT (BMI 25.0-29.9): ICD-10-CM

## 2018-01-22 LAB
HBA1C MFR BLD: 7.6 % (ref 0–5.7)
T4 FREE SERPL-MCNC: 1.01 NG/DL (ref 0.76–1.46)
TSH SERPL DL<=0.005 MIU/L-ACNC: 1.07 MU/L (ref 0.4–4)

## 2018-01-22 PROCEDURE — 84443 ASSAY THYROID STIM HORMONE: CPT | Performed by: INTERNAL MEDICINE

## 2018-01-22 PROCEDURE — 99214 OFFICE O/P EST MOD 30 MIN: CPT | Performed by: INTERNAL MEDICINE

## 2018-01-22 PROCEDURE — 83036 HEMOGLOBIN GLYCOSYLATED A1C: CPT | Performed by: INTERNAL MEDICINE

## 2018-01-22 PROCEDURE — 84439 ASSAY OF FREE THYROXINE: CPT | Performed by: INTERNAL MEDICINE

## 2018-01-22 PROCEDURE — 36415 COLL VENOUS BLD VENIPUNCTURE: CPT | Performed by: INTERNAL MEDICINE

## 2018-01-22 RX ORDER — TOPIRAMATE 100 MG/1
100 TABLET, FILM COATED ORAL DAILY
Qty: 90 TABLET | Refills: 2 | Status: SHIPPED | OUTPATIENT
Start: 2018-01-22 | End: 2018-06-11

## 2018-01-22 RX ORDER — GLIPIZIDE 10 MG/1
10 TABLET, FILM COATED, EXTENDED RELEASE ORAL DAILY
Qty: 90 TABLET | Refills: 3 | Status: SHIPPED | OUTPATIENT
Start: 2018-01-22 | End: 2018-06-11

## 2018-01-22 RX ORDER — DAPAGLIFLOZIN 10 MG/1
TABLET, FILM COATED ORAL
Qty: 90 TABLET | Refills: 3 | Status: SHIPPED | OUTPATIENT
Start: 2018-01-22 | End: 2018-06-11

## 2018-01-22 NOTE — LETTER
Patient:  Lorrie Blake  :   1960  MRN:     [id number]        Ms.Cheryl LANDEN Blake  [address]  [place] [address]        2018    Dear ,    We are writing to inform you of your test results.    Resulted Orders   Hemoglobin A1c POCT   Result Value Ref Range    Hemoglobin A1C 7.6 %   TSH   Result Value Ref Range    TSH 1.07 0.40 - 4.00 mU/L   T4 free   Result Value Ref Range    T4 Free 1.01 0.76 - 1.46 ng/dL       Your test results fall within the expected range(s) or remain unchanged from previous results.  Please continue with current treatment plan. thyroid test is completely normal.     Dr. Padmini calderon

## 2018-01-22 NOTE — MR AVS SNAPSHOT
After Visit Summary   1/22/2018    Lorrie Blake    MRN: [id number]           Patient Information     Date Of Birth          1960        Visit Information        Provider Department      1/22/2018 3:15 PM Froy Washington MD; MG LINDA [facility]        Today's Diagnoses     Fatigue, unspecified type    -  1    Type 2 diabetes mellitus with mild nonproliferative diabetic retinopathy without macular edema (H)        Type 2 diabetes mellitus (H)        Overweight (BMI 25.0-29.9)        Type 2 diabetes mellitus with hyperglycemia (H)          Care Instructions      Sending blood sugars to your provider at [place]:  We want to help you with your diabetes management, which often requires frequent adjustments to your therapy. For your convenience, we have several ways to send your blood sugars to your doctor for review.    - Send message directly to your doctor through My Chart.  Please ask the rooming staff if you would like to sign up for My Chart.  This is a fast and confidential way to send your information and communicate directly with your provider.   - Record readings and fax to [phone].  We have a template for you to use for your convenience.  - Stop by the clinic with your meter for download if advised by provider.   - My Chart or call Kinjal Irving, Diabetes Educator at [phone]  - Call the clinic and speak to one of the endocrine nurses to relay information on the telephone.  Miriam Gonzales, or Kiki at [phone].   -    Please call the on-call Endocrinologist at the [place] for after       hours/weekend needs at [phone] Option #4.    Please note that you do not need to FAST if you are just having an A1C drawn. Please remember to ALWAYS bring your glucose meter with to your appointment. This data is very important for the management of your care.    Thank you!  Your [place] Diabetes Care Team                Follow-ups after your  visit        Follow-up notes from your care team     Return in about 4 months (around 5/22/2018) for Labs Today.      Your next 10 appointments already scheduled     Jun 11, 2018  4:15 PM CDT   Return Visit with Froy Washington MD, MG ENDO NURSE   [facility] [place] ([facility])    [id number] [id number]   [phone]              Future tests that were ordered for you today     Open Future Orders        Priority Expected Expires Ordered    TSH Routine 1/22/2018 1/22/2019 1/22/2018    T4 free Routine 1/22/2018 1/22/2019 1/22/2018            Who to contact     If you have questions or need follow up information about today's clinic visit or your schedule please contact [facility] directly at [phone].  Normal or non-critical lab and imaging results will be communicated to you by MyChart, letter or phone within 4 business days after the clinic has received the results. If you do not hear from us within 7 days, please contact the clinic through MyChart or phone. If you have a critical or abnormal lab result, we will notify you by phone as soon as possible.  Submit refill requests through [employer] or call your pharmacy and they will forward the refill request to us. Please allow 3 business days for your refill to be completed.          Additional Information About Your Visit        MyChart Information     [employer] is an electronic gateway that provides easy, online access to your medical records. With [employer], you can request a clinic appointment, read your test results, renew a prescription or communicate with your care team.     To sign up for [employer] visit the website at [email or web address]   You will be asked to enter the access code listed below, as well as some personal information. Please follow the directions to create your username and password.     Your access code is: [email or web address]  Expires: 4/22/2018  4:00 PM     Your  access code will  in 90 days. If you need help or a new code, please contact your [facility] or call [phone] for assistance.        Care EveryWhere ID     This is your Care EveryWhere ID. This could be used by other organizations to access your [place] medical records  [id number]        Your Vitals Were     Pulse Temperature Height Pulse Oximetry BMI (Body Mass Index)       96 98.2  F (36.8  C) 1.524 m (5') 99% 26.98 kg/m2        Blood Pressure from Last 3 Encounters:   18 (!) 158/108   17 (!) 147/104   17 137/89    Weight from Last 3 Encounters:   18 62.7 kg (138 lb 2 oz)   17 59.1 kg (130 lb 3.2 oz)   17 58.9 kg (129 lb 13.6 oz)              We Performed the Following     Hemoglobin A1c POCT          Where to get your medicines      These medications were sent to [facility] - [place], [place] - [address] AT [facility] [address] ( [facility])  [address], [place] [address]     Phone:  [phone]     blood glucose monitoring test strip    dapagliflozin 10 MG Tabs tablet    glipiZIDE 10 MG 24 hr tablet    metFORMIN 500 MG tablet    topiramate 100 MG tablet          Primary Care Provider    [facility]       No address on file        Equal Access to Services     DONNA WU AH: Hadii isacc hernandezo Sochema, waaxda luqadaha, qaybta kaalmada duyenyamatt, jose roberto max. So [facility] [phone].    ATENCIÓN: Si habla español, tiene a thornton disposición servicios gratuitos de asistencia lingüística. Albert al [phone].    We comply with applicable federal civil rights laws and [place] laws. We do not discriminate on the basis of race, color, national origin, age, disability, sex, sexual orientation, or gender identity.            Thank you!     Thank you for choosing [facility]  for your care. Our goal is always to provide you with excellent care.  Hearing back from our patients is one way we can continue to improve our services. Please take a few minutes to complete the written survey that you may receive in the mail after your visit with us. Thank you!             Your Updated Medication List - Protect others around you: Learn how to safely use, store and throw away your medicines at www.disposemymeds.org.          This list is accurate as of: 1/22/18  4:00 PM.  Always use your most recent med list.                   Brand Name Dispense Instructions for use Diagnosis    ASPIRIN EC PO      Take 81 mg by mouth daily        BIOTIN PO      Take by mouth daily    Type 2 diabetes mellitus with mild nonproliferative diabetic retinopathy without macular edema (H)       blood glucose monitoring test strip    no brand specified    100 strip    Use to test blood sugars 3 times daily or as directed. Accu-chek smartview test strips (has Marisela meter)    Type 2 diabetes mellitus (H)       dapagliflozin 10 MG Tabs tablet    FARXIGA    90 tablet    Take one tablet by mouth daily    Type 2 diabetes mellitus with mild nonproliferative diabetic retinopathy without macular edema (H)       glipiZIDE 10 MG 24 hr tablet    GLUCOTROL XL    90 tablet    Take 1 tablet (10 mg) by mouth daily    Overweight (BMI 25.0-29.9)       LINZESS 290 MCG capsule   Generic drug:  linaclotide      Take 290 mcg by mouth every morning (before breakfast)        melatonin 3 MG tablet      Take 3 mg by mouth nightly as needed for sleep        METAMUCIL PO      Take by mouth daily    Overweight (BMI 25.0-29.9)       metFORMIN 500 MG tablet    GLUCOPHAGE    360 tablet    2 tablets with breakfast and 2 tablet with supper    Type 2 diabetes mellitus with hyperglycemia (H)       MULTIVITAMINS PO      Take by mouth daily    Overweight (BMI 25.0-29.9)       OMEPRAZOLE PO      Take 40 mg by mouth every morning        PROBIOTIC DAILY PO      Take by mouth daily    Type 2 diabetes mellitus with mild  nonproliferative diabetic retinopathy without macular edema (H)       topiramate 100 MG tablet    TOPAMAX    90 tablet    Take 1 tablet (100 mg) by mouth daily    Overweight (BMI 25.0-29.9)       VITAMIN D-3 PO      Take by mouth daily

## 2018-01-22 NOTE — PATIENT INSTRUCTIONS
Sending blood sugars to your provider at [place]:  We want to help you with your diabetes management, which often requires frequent adjustments to your therapy. For your convenience, we have several ways to send your blood sugars to your doctor for review.    - Send message directly to your doctor through My Chart.  Please ask the rooming staff if you would like to sign up for My Chart.  This is a fast and confidential way to send your information and communicate directly with your provider.   - Record readings and fax to [phone].  We have a template for you to use for your convenience.  - Stop by the clinic with your meter for download if advised by provider.   - My Chart or call Kinjal Irving, Diabetes Educator at [phone]  - Call the clinic and speak to one of the endocrine nurses to relay information on the telephone.  Miriam Gonzales, or Kiki at [phone].   -    Please call the on-call Endocrinologist at the [place] for after       hours/weekend needs at [phone] Option #4.    Please note that you do not need to FAST if you are just having an A1C drawn. Please remember to ALWAYS bring your glucose meter with to your appointment. This data is very important for the management of your care.    Thank you!  Your [place] Diabetes Care Team

## 2018-01-22 NOTE — NURSING NOTE
Patient requested to change score on PHQ 2 and declined PHQ 9 as she already sees someone for counseling.  Kiki Davies LPN

## 2018-01-22 NOTE — PROGRESS NOTES
Endocrinology Note         Lorrie is a 57 year old female presents today for type 2 diabetes.    HPI  Lorrie Blake is a 57 years old female with hx of uncontrolled type 2 diabetes and retinopathy. Last seen 8/2017    She has had type 2 diabetes since 1997 and was initially treated with glyburide. She reported that sulfonylurea did not work for her and A1C has been persistenly >14 since 2012. She was also tried on different medications including Byetta, Victoza, Bydureon (it did not improve her glucose and it is expensive for her), insulin (it caused significant hypoglycemia and too expensive). In February 2015, c-peptide was 2.8 and negative insulin antibodies. At that time,she was then started on Farxiga and metformin. She does not want to be on GLP-1 agonist due to financial issue.      Interim history  She stated she has a lot of stress and has been very busy.  She has been very tired and had difficult time losing weight.  She has been feeling cold.  She is wondering whether she had thyroid problem.  She denies any snoring or stop breathing at night.  She does not want to have sleep testing.  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     A1C today is 7.6 % which has improved from the last visit. Reviewed glucose meter today, she did have BG randomly. Average glucose 177. No hypoglycemia noted.  She is currently on glipizide XL 10 mg, Farxiga 10 mg daily and metformin 1000 mg bid.  She is taking topiramate 100 mg daily at night for weight loss. She is very frustrated today due to a lot of stresses fatigue, inability to lose weight.  She stated that she has not eaten much but has not eaten regularly.  She exercises 2 times per week as she is a  at [employer]    She has had problem with her stomach and constipation. She has had bowel movement every 3-4 days and required Linzess.     DM complications:  Retinopathy: exam in 6/2017-- mild NPDR  bilaterally  Nephropathy: negative for microalb in 8/2017  Neuropathy: denied   Aspirin 81 mg daily    Past Medical History  Type 2 diabetes with retinopathy  Schatzki's ring s/p esophageal dilatation    Allergies  Allergies   Allergen Reactions     Ace Inhibitors Itching     Codeine      Medications  Current Outpatient Prescriptions   Medication Sig Dispense Refill     Cholecalciferol (VITAMIN D-3 PO) Take by mouth daily       dapagliflozin (FARXIGA) 10 MG TABS tablet Take one tablet by mouth daily 90 tablet 3     Psyllium (METAMUCIL PO) Take by mouth daily       glipiZIDE (GLUCOTROL XL) 10 MG 24 hr tablet Take 1 tablet (10 mg) by mouth daily 90 tablet 3     topiramate (TOPAMAX) 100 MG tablet Take 1 tablet (100 mg) by mouth daily 90 tablet 2     metFORMIN (GLUCOPHAGE) 500 MG tablet 2 tablets with breakfast and 2 tablet with supper 360 tablet 3     linaclotide (LINZESS) 290 MCG capsule Take 290 mcg by mouth every morning (before breakfast)       blood glucose monitoring (NO BRAND SPECIFIED) test strip Use to test blood sugars 3 times daily or as directed. Accu-chek smartview test strips (has Marisela meter) 100 strip 3     melatonin 3 MG tablet Take 3 mg by mouth nightly as needed for sleep       OMEPRAZOLE PO Take 40 mg by mouth every morning        Multiple Vitamin (MULTIVITAMINS PO) Take by mouth daily       Probiotic Product (PROBIOTIC DAILY PO) Take by mouth daily       BIOTIN PO Take by mouth daily       ASPIRIN EC PO Take 81 mg by mouth daily       Family History  Type 2 diabetes in father, mother and all siblings    Social History  Social History   Substance Use Topics     Smoking status: Never Smoker     Smokeless tobacco: Never Used     Alcohol use Not on file       ROS  Constitutional: Gained 8 pounds over the past 6 months, fatigue, inability to lose weight  Eyes: no vision change, diplopia or red eyes   Neck: +schatzki ring  Cardiovascular: no chest pain, palpitations  Respiratory: no dyspnea, cough,  shortness of breath or wheezing   GI: no nausea, vomiting, diarrhea, + constipation, no abdominal pain   : no change in urine, no dysuria  Musculoskeletal: no joint or muscle pain or swelling   Integumentary: no concerning lesions   Neuro: no loss of strength or sensation, no numbness or tingling, no tremor, no dizziness, no headache   Endo: no polyuria or polydipsia, feeling cold intolerance   Heme/Lymph: no concerning bumps, no bleeding problems   Allergy: no environmental allergies   Psych: +frustrated due to a lot of stresses     Physical Exam  BP (!) 158/108  Pulse 96  Temp 98.2  F (36.8  C)  Ht 1.524 m (5')  Wt 62.7 kg (138 lb 2 oz)  SpO2 99%  BMI 26.98 kg/m2  Body mass index is 26.98 kg/(m^2).  Constitutional: no distress, comfortable, pleasant   Eyes: anicteric  Thyroid: firm thyroid, not enlarged  CVS: RRR, normal S1,S2, no murmur  Lungs: CTA B/L  Abd: soft, NT, ND, no hepatomegaly  Musculoskeletal: no edema   Skin:  no jaundice   Neurological: normal gait  Psychological: feels frustrated about her symptoms.    RESULTS  Lab Results   Component Value Date    A1C 7.6 01/22/2018    A1C 9.2 08/28/2017    A1C 8.4 01/23/2017    A1C 7.9 08/01/2016    A1C 7.5 03/30/2016     ENDO DIABETES Latest Ref Rng & Units 8/28/2017   CHOLESTEROL <200 mg/dL 219 (H)   LDL CHOLESTEROL, CALCULATED <100 mg/dL 127 (H)   HDL CHOLESTEROL >49 mg/dL 55   NON HDL CHOLESTEROL <130 mg/dL 164 (H)   TRIGLYCERIDES <150 mg/dL 183 (H)   ALBUMIN URINE MG/L mg/L 7   ALBUMIN URINE MG/G CR 0 - 25 mg/g Cr 10.46   CREATININE 0.52 - 1.04 mg/dL 0.66     ASSESSMENT:    Lorrie Blake is a 57 years old female with hx of uncontrolled type 2 diabetes and retinopathy who is here for follow up.    1) type 2 diabetes with retinopathy: A1C today is 7.6% which has improved from the last visit.  - she is under a lot of stresses  -Given improvement of her diabetes control,  I will continue glipizide XR 10 mg daily, metformin 1000 mg bid and Farxiga 10 mg  daily.  - she does not want to do injectable medication due to cost.    2) DM complications:  Retinopathy: exam in 6/2017-- mild NPDR bilaterally  Nephropathy: negative for microalb in 8/28/2017  Neuropathy: intermittent - likely from topiramate    3) overweight: due to increased sweet craving. Still craves sweet. Irregular eating habit.  -Discontinue phentermine due to hypertension  -Continue topiramate 100 mg daily.  I offered her to discontinue topiramate due to fatigue and inability to focus but she does not want to do so at this time.   -I also offer her for sleep study.    4) Hypertension: Uncontrolled with diastolic hypertension.  She does not want to be on any medication at this time.    5) Fatigue: unclear etiology  I think it is multifactorial. I offer her for sleep study to rule out sleep apnea but she declined at this time. I will also check thyroid function test per patient request today.      PLAN:   - Check thyroid function test today  - Continue with glipizide XR 10 mg daily  - continue Farxiga 10 mg and metformin 1000 mg bid  - continue topiramate 100 mg daily  - emphasize her to check blood glucose regularly.    - RTC 4-5 months to see me    Addendum  ENDO THYROID LABS-P Latest Ref Rng & Units 1/22/2018   TSH 0.40 - 4.00 mU/L 1.07   T4 FREE 0.76 - 1.46 ng/dL 1.01   TFT is completely normal.    Fryo Washington MD  Endocrinology  [phone]

## 2018-01-22 NOTE — LETTER
1/22/2018         RE: Lorrie Blake  [address]  [place] [address]        Dear Colleague,    Thank you for referring your patient, Lorrie Blake, to the [facility]. Please see a copy of my visit note below.         Endocrinology Note         Lorrie is a 57 year old female presents today for type 2 diabetes.    HPI  Lorrie Blake is a 57 years old female with hx of uncontrolled type 2 diabetes and retinopathy. Last seen 8/2017    She has had type 2 diabetes since 1997 and was initially treated with glyburide. She reported that sulfonylurea did not work for her and A1C has been persistenly >14 since 2012. She was also tried on different medications including Byetta, Victoza, Bydureon (it did not improve her glucose and it is expensive for her), insulin (it caused significant hypoglycemia and too expensive). In February 2015, c-peptide was 2.8 and negative insulin antibodies. At that time,she was then started on Farxiga and metformin. She does not want to be on GLP-1 agonist due to financial issue.      Interim history  She stated she has a lot of stress and has been very busy.  She has been very tired and had difficult time losing weight.  She has been feeling cold.  She is wondering whether she had thyroid problem.  She denies any snoring or stop breathing at night.  She does not want to have sleep testing.  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     A1C today is 7.6 % which has improved from the last visit. Reviewed glucose meter today, she did have BG randomly. Average glucose 177. No hypoglycemia noted.  She is currently on glipizide XL 10 mg, Farxiga 10 mg daily and metformin 1000 mg bid.  She is taking topiramate 100 mg daily at night for weight loss. She is very frustrated today due to a lot of stresses fatigue, inability to lose weight.  She stated that she has not eaten much but has not eaten regularly.  She  exercises 2 times per week as she is a  at [employer].    She has had problem with her stomach and constipation. She has had bowel movement every 3-4 days and required Linzess.     DM complications:  Retinopathy: exam in 6/2017-- mild NPDR bilaterally  Nephropathy: negative for microalb in 8/2017  Neuropathy: denied   Aspirin 81 mg daily    Past Medical History  Type 2 diabetes with retinopathy  Schatzki's ring s/p esophageal dilatation    Allergies  Allergies   Allergen Reactions     Ace Inhibitors Itching     Codeine      Medications  Current Outpatient Prescriptions   Medication Sig Dispense Refill     Cholecalciferol (VITAMIN D-3 PO) Take by mouth daily       dapagliflozin (FARXIGA) 10 MG TABS tablet Take one tablet by mouth daily 90 tablet 3     Psyllium (METAMUCIL PO) Take by mouth daily       glipiZIDE (GLUCOTROL XL) 10 MG 24 hr tablet Take 1 tablet (10 mg) by mouth daily 90 tablet 3     topiramate (TOPAMAX) 100 MG tablet Take 1 tablet (100 mg) by mouth daily 90 tablet 2     metFORMIN (GLUCOPHAGE) 500 MG tablet 2 tablets with breakfast and 2 tablet with supper 360 tablet 3     linaclotide (LINZESS) 290 MCG capsule Take 290 mcg by mouth every morning (before breakfast)       blood glucose monitoring (NO BRAND SPECIFIED) test strip Use to test blood sugars 3 times daily or as directed. Accu-chek smartview test strips (has Marisela meter) 100 strip 3     melatonin 3 MG tablet Take 3 mg by mouth nightly as needed for sleep       OMEPRAZOLE PO Take 40 mg by mouth every morning        Multiple Vitamin (MULTIVITAMINS PO) Take by mouth daily       Probiotic Product (PROBIOTIC DAILY PO) Take by mouth daily       BIOTIN PO Take by mouth daily       ASPIRIN EC PO Take 81 mg by mouth daily       Family History  Type 2 diabetes in father, mother and all siblings    Social History  Social History   Substance Use Topics     Smoking status: Never Smoker     Smokeless tobacco: Never Used     Alcohol use Not on file        ROS  Constitutional: Gained 8 pounds over the past 6 months, fatigue, inability to lose weight  Eyes: no vision change, diplopia or red eyes   Neck: +schatzki ring  Cardiovascular: no chest pain, palpitations  Respiratory: no dyspnea, cough, shortness of breath or wheezing   GI: no nausea, vomiting, diarrhea, + constipation, no abdominal pain   : no change in urine, no dysuria  Musculoskeletal: no joint or muscle pain or swelling   Integumentary: no concerning lesions   Neuro: no loss of strength or sensation, no numbness or tingling, no tremor, no dizziness, no headache   Endo: no polyuria or polydipsia, feeling cold intolerance   Heme/Lymph: no concerning bumps, no bleeding problems   Allergy: no environmental allergies   Psych: +frustrated due to a lot of stresses     Physical Exam  BP (!) 158/108  Pulse 96  Temp 98.2  F (36.8  C)  Ht 1.524 m (5')  Wt 62.7 kg (138 lb 2 oz)  SpO2 99%  BMI 26.98 kg/m2  Body mass index is 26.98 kg/(m^2).  Constitutional: no distress, comfortable, pleasant   Eyes: anicteric  Thyroid: firm thyroid, not enlarged  CVS: RRR, normal S1,S2, no murmur  Lungs: CTA B/L  Abd: soft, NT, ND, no hepatomegaly  Musculoskeletal: no edema   Skin:  no jaundice   Neurological: normal gait  Psychological: feels frustrated about her symptoms.    RESULTS  Lab Results   Component Value Date    A1C 7.6 01/22/2018    A1C 9.2 08/28/2017    A1C 8.4 01/23/2017    A1C 7.9 08/01/2016    A1C 7.5 03/30/2016     ENDO DIABETES Latest Ref Rng & Units 8/28/2017   CHOLESTEROL <200 mg/dL 219 (H)   LDL CHOLESTEROL, CALCULATED <100 mg/dL 127 (H)   HDL CHOLESTEROL >49 mg/dL 55   NON HDL CHOLESTEROL <130 mg/dL 164 (H)   TRIGLYCERIDES <150 mg/dL 183 (H)   ALBUMIN URINE MG/L mg/L 7   ALBUMIN URINE MG/G CR 0 - 25 mg/g Cr 10.46   CREATININE 0.52 - 1.04 mg/dL 0.66     ASSESSMENT:    Lorrie Blake is a 57 years old female with hx of uncontrolled type 2 diabetes and retinopathy who is here for follow up.    1) type  2 diabetes with retinopathy: A1C today is 7.6% which has improved from the last visit.  - she is under a lot of stresses  -Given improvement of her diabetes control,  I will continue glipizide XR 10 mg daily, metformin 1000 mg bid and Farxiga 10 mg daily.  - she does not want to do injectable medication due to cost.    2) DM complications:  Retinopathy: exam in 6/2017-- mild NPDR bilaterally  Nephropathy: negative for microalb in 8/28/2017  Neuropathy: intermittent - likely from topiramate    3) overweight: due to increased sweet craving. Still craves sweet. Irregular eating habit.  -Discontinue phentermine due to hypertension  -Continue topiramate 100 mg daily.  I offered her to discontinue topiramate due to fatigue and inability to focus but she does not want to do so at this time.   -I also offer her for sleep study.    4) Hypertension: Uncontrolled with diastolic hypertension.  She does not want to be on any medication at this time.    5) Fatigue: unclear etiology  I think it is multifactorial. I offer her for sleep study to rule out sleep apnea but she declined at this time. I will also check thyroid function test per patient request today.      PLAN:   - Check thyroid function test today  - Continue with glipizide XR 10 mg daily  - continue Farxiga 10 mg and metformin 1000 mg bid  - continue topiramate 100 mg daily  - emphasize her to check blood glucose regularly.    - RTC 4-5 months to see me    Addendum  ENDO THYROID LABS-[facility] Latest Ref Rng & Units 1/22/2018   TSH 0.40 - 4.00 mU/L 1.07   T4 FREE 0.76 - 1.46 ng/dL 1.01   TFT is completely normal.    Froy Washington MD  Endocrinology  [phone]        Again, thank you for allowing me to participate in the care of your patient.        Sincerely,        Froy Washington MD

## 2018-01-22 NOTE — LETTER
Date:January 24, 2018      Patient was self referred, no letter generated. Do not send.        [facility] Physicians Health Information

## 2018-01-22 NOTE — NURSING NOTE
Lorrie Blake's goals for this visit include:   Chief Complaint   Patient presents with     Diabetes       She requests these members of her care team be copied on today's visit information: [facility]      PCP: [facility]    Referring Provider:  No referring provider defined for this encounter.    Chief Complaint   Patient presents with     Diabetes       Initial BP (!) 158/108  Pulse 96  Temp 98.2  F (36.8  C)  Ht 1.524 m (5')  Wt 62.7 kg (138 lb 2 oz)  SpO2 99%  BMI 26.98 kg/m2 Estimated body mass index is 26.98 kg/(m^2) as calculated from the following:    Height as of this encounter: 1.524 m (5').    Weight as of this encounter: 62.7 kg (138 lb 2 oz).  Medication Reconciliation: complete    Do you need any medication refills at today's visit? Yes    Kiki Davies LPN

## 2018-01-23 ENCOUNTER — TELEPHONE (OUTPATIENT)
Dept: ENDOCRINOLOGY | Facility: CLINIC | Age: 58
End: 2018-01-23

## 2018-01-23 PROBLEM — E11.3299 TYPE 2 DIABETES MELLITUS WITH MILD NONPROLIFERATIVE DIABETIC RETINOPATHY WITHOUT MACULAR EDEMA (H): Status: ACTIVE | Noted: 2018-01-23

## 2018-01-23 PROBLEM — E66.3 OVERWEIGHT (BMI 25.0-29.9): Status: ACTIVE | Noted: 2018-01-23

## 2018-01-23 PROBLEM — E11.3299 TYPE 2 DIABETES MELLITUS WITH MILD NONPROLIFERATIVE DIABETIC RETINOPATHY WITHOUT MACULAR EDEMA (H): Status: RESOLVED | Noted: 2018-01-23 | Resolved: 2018-01-23

## 2018-01-23 PROBLEM — R53.83 FATIGUE, UNSPECIFIED TYPE: Status: ACTIVE | Noted: 2018-01-23

## 2018-01-23 PROBLEM — E11.3299 TYPE 2 DIABETES MELLITUS WITH MILD NONPROLIFERATIVE RETINOPATHY WITHOUT MACULAR EDEMA, WITHOUT LONG-TERM CURRENT USE OF INSULIN, UNSPECIFIED LATERALITY (H): Status: ACTIVE | Noted: 2018-01-23

## 2018-01-23 NOTE — TELEPHONE ENCOUNTER
Patient notified of results. She had no further questions at this time.    Miriam Black LPN  Adult Endocrinology  [facility]

## 2018-01-23 NOTE — TELEPHONE ENCOUNTER
Per Padmini Message:  Please let Lorrie know that her thyroid test is completely normal.  Could you please call her? Because she is anxious about the result.     Left message for patient to return call.    Kiki Davies LPN  Adult Endocrinology  [facility]

## 2018-06-11 ENCOUNTER — OFFICE VISIT (OUTPATIENT)
Dept: ENDOCRINOLOGY | Facility: CLINIC | Age: 58
End: 2018-06-11
Payer: COMMERCIAL

## 2018-06-11 VITALS
WEIGHT: 142.64 LBS | BODY MASS INDEX: 28 KG/M2 | DIASTOLIC BLOOD PRESSURE: 93 MMHG | SYSTOLIC BLOOD PRESSURE: 131 MMHG | OXYGEN SATURATION: 99 % | HEART RATE: 108 BPM | HEIGHT: 60 IN

## 2018-06-11 DIAGNOSIS — E66.3 OVERWEIGHT (BMI 25.0-29.9): ICD-10-CM

## 2018-06-11 DIAGNOSIS — E11.3299 TYPE 2 DIABETES MELLITUS WITH MILD NONPROLIFERATIVE RETINOPATHY WITHOUT MACULAR EDEMA, WITHOUT LONG-TERM CURRENT USE OF INSULIN, UNSPECIFIED LATERALITY (H): Primary | ICD-10-CM

## 2018-06-11 DIAGNOSIS — K59.04 CHRONIC IDIOPATHIC CONSTIPATION: ICD-10-CM

## 2018-06-11 LAB — HBA1C MFR BLD: 8 % (ref 0–5.7)

## 2018-06-11 PROCEDURE — 83036 HEMOGLOBIN GLYCOSYLATED A1C: CPT | Performed by: INTERNAL MEDICINE

## 2018-06-11 PROCEDURE — 36415 COLL VENOUS BLD VENIPUNCTURE: CPT | Performed by: INTERNAL MEDICINE

## 2018-06-11 PROCEDURE — 99214 OFFICE O/P EST MOD 30 MIN: CPT | Performed by: INTERNAL MEDICINE

## 2018-06-11 RX ORDER — GLIPIZIDE 10 MG/1
10 TABLET, FILM COATED, EXTENDED RELEASE ORAL DAILY
Qty: 90 TABLET | Refills: 3 | Status: SHIPPED | OUTPATIENT
Start: 2018-06-11 | End: 2019-03-18

## 2018-06-11 RX ORDER — TOPIRAMATE 100 MG/1
100 TABLET, FILM COATED ORAL DAILY
Qty: 90 TABLET | Refills: 2 | Status: SHIPPED | OUTPATIENT
Start: 2018-06-11 | End: 2018-11-12

## 2018-06-11 RX ORDER — DAPAGLIFLOZIN 10 MG/1
TABLET, FILM COATED ORAL
Qty: 90 TABLET | Refills: 3 | Status: SHIPPED | OUTPATIENT
Start: 2018-06-11 | End: 2019-03-18

## 2018-06-11 RX ORDER — LANCETS
EACH MISCELLANEOUS
Qty: 102 EACH | Refills: 3 | Status: SHIPPED | OUTPATIENT
Start: 2018-06-11 | End: 2020-05-01

## 2018-06-11 NOTE — LETTER
6/11/2018         RE: Lorrie Blake  [address]  [place] [address]        Dear Colleague,    Thank you for referring your patient, Lorrie Blake, to the [facility]. Please see a copy of my visit note below.         Endocrinology Note         Lorrie is a 57 year old female presents today for type 2 diabetes.    HPI  Lorrie Blake is a 57 years old female with hx of uncontrolled type 2 diabetes and retinopathy. Last seen 1/22/2018    She has had type 2 diabetes since 1997 and was initially treated with glyburide. She reported that sulfonylurea did not work for her and A1C has been persistenly >14 since 2012. She was also tried on different medications including Byetta, Victoza, Bydureon (it did not improve her glucose and it is expensive for her), insulin (it caused significant hypoglycemia and too expensive). In February 2015, c-peptide was 2.8 and negative insulin antibodies. At that time,she was then started on Farxiga and metformin. She does not want to be on GLP-1 agonist due to financial issue.      Interim history  A1C today is 8.0 %. Reviewed glucose meter over 7 days, average glucose 167. No hypoglycemia noted.  She is currently on glipizide XL 10 mg, Farxiga 10 mg daily and metformin 1000 mg bid.  She is taking topiramate 100 mg daily at night for weight loss. She is very frustrated due to multiple stresses, fatigue and inability to lose weight.She stated she has a lot of stress and has been very busy.  She has been very tired and had difficult time losing weight.  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 She said that she has not have time to eat or exercise. She has one meal a day at supper but snack on sweet at night.    She has had problem with her stomach and constipation. She has had bowel movement every 3-4 days and required to take Linzess.     DM complications:  Retinopathy: exam in 6/2017-- mild NPDR  bilaterally  Nephropathy: negative for microalb in 8/2017  Neuropathy: denied   Aspirin 81 mg daily    Past Medical History  Type 2 diabetes with retinopathy  Schatzki's ring s/p esophageal dilatation    Allergies  Allergies   Allergen Reactions     Ace Inhibitors Itching     Codeine      Medications  Current Outpatient Prescriptions   Medication Sig Dispense Refill     blood glucose monitoring (NO BRAND SPECIFIED) test strip Use to test blood sugars 3 times daily or as directed. Accu-chek smartview test strips (has Marisela meter) 100 strip 3     Cholecalciferol (VITAMIN D-3 PO) Take by mouth daily       dapagliflozin (FARXIGA) 10 MG TABS tablet Take one tablet by mouth daily 90 tablet 3     glipiZIDE (GLUCOTROL XL) 10 MG 24 hr tablet Take 1 tablet (10 mg) by mouth daily 90 tablet 3     linaclotide (LINZESS) 290 MCG capsule Take 290 mcg by mouth every morning (before breakfast)       melatonin 3 MG tablet Take 3 mg by mouth nightly as needed for sleep       metFORMIN (GLUCOPHAGE) 500 MG tablet 2 tablets with breakfast and 2 tablet with supper 360 tablet 3     OMEPRAZOLE PO Take 40 mg by mouth every morning        Psyllium (METAMUCIL PO) Take by mouth daily       topiramate (TOPAMAX) 100 MG tablet Take 1 tablet (100 mg) by mouth daily 90 tablet 2     Family History  Type 2 diabetes in father, mother and all siblings    Social History  Social History   Substance Use Topics     Smoking status: Never Smoker     Smokeless tobacco: Never Used     Alcohol use Not on file       ROS  Constitutional: + fatigue, inability to lose weight  Eyes: no vision change, diplopia or red eyes   Neck: +schatzki ring  Cardiovascular: no chest pain, palpitations  Respiratory: no dyspnea, cough, shortness of breath or wheezing   GI: no nausea, vomiting, diarrhea, + constipation, no abdominal pain   : no change in urine, no dysuria  Musculoskeletal: no joint or muscle pain or swelling   Integumentary: no concerning lesions   Neuro: no loss of  strength or sensation, no numbness or tingling, no tremor, no dizziness, no headache   Endo: no polyuria or polydipsia, feeling cold intolerance   Heme/Lymph: no concerning bumps, no bleeding problems   Allergy: no environmental allergies   Psych: +frustrated due to a lot of stresses     Physical Exam  BP (!) 131/93  Pulse 108  Ht 1.524 m (5')  Wt 64.7 kg (142 lb 10.2 oz)  SpO2 99%  BMI 27.86 kg/m2  Body mass index is 27.86 kg/(m^2).  Constitutional: no distress, comfortable, pleasant   Eyes: anicteric  Thyroid: firm thyroid, not enlarged  CVS: RRR, normal S1,S2, no murmur  Lungs: CTA B/L  Abd: soft, NT, ND, no hepatomegaly  Musculoskeletal: no edema   Skin:  no jaundice   Neurological: normal gait, intact monofilament sensation bilateral feet  Psychological: feels frustrated about her symptoms.    RESULTS  Lab Results   Component Value Date    A1C 8.0 06/11/2018    A1C 7.6 01/22/2018    A1C 9.2 08/28/2017    A1C 8.4 01/23/2017    A1C 7.9 08/01/2016       ENDO DIABETES Latest Ref Rng & Units 8/28/2017   CHOLESTEROL <200 mg/dL 219 (H)   LDL CHOLESTEROL, CALCULATED <100 mg/dL 127 (H)   HDL CHOLESTEROL >49 mg/dL 55   NON HDL CHOLESTEROL <130 mg/dL 164 (H)   TRIGLYCERIDES <150 mg/dL 183 (H)   ALBUMIN URINE MG/L mg/L 7   ALBUMIN URINE MG/G CR 0 - 25 mg/g Cr 10.46   CREATININE 0.52 - 1.04 mg/dL 0.66     ASSESSMENT:    Lorrie Blake is a 57 years old female with hx of uncontrolled type 2 diabetes and retinopathy who is here for follow up.    1) type 2 diabetes with retinopathy: A1C today is 8.0% which is worsening  - she is under a lot of stresses, lack of time for exercise. Also has been snacking at night  -I will continue glipizide XR 10 mg daily, metformin 1000 mg bid and Farxiga 10 mg daily.  - she does not want to do injectable medication due to cost.    2) DM complications:  Retinopathy: exam in 6/2017-- mild NPDR bilaterally  Nephropathy: negative for microalb in 8/28/2017  Neuropathy: intermittent - likely  from topiramate    3) overweight: due to increased sweet craving. Still craves sweet. Irregular eating habit  -Continue topiramate 100 mg daily  -restart low dose phentermine 8 mg daily per patient's request. BP today is controlled.    4) Hypertension: diastolic hypertension.  She does not want to be on any medication at this time.    PLAN:   - start phentermine 8 mg daily  - Continue with glipizide XR 10 mg daily  - continue Farxiga 10 mg and metformin 1000 mg bid  - continue topiramate 100 mg daily  - emphasize her to check blood glucose regularly.  - RTC 4-5 months to see me    Froy Washington MD  Endocrinology  [phone]        Again, thank you for allowing me to participate in the care of your patient.        Sincerely,        Froy Washington MD

## 2018-06-11 NOTE — LETTER
Date:June 12, 2018      Patient was self referred, no letter generated. Do not send.        [facility] Physicians Health Information

## 2018-06-11 NOTE — NURSING NOTE
Lorrie Blake's goals for this visit include: Follow up Diabetes  She requests these members of her care team be copied on today's visit information: NO    PCP: [facility]    Referring Provider:  No referring provider defined for this encounter.    Ht 1.524 m (5')  Wt 64.7 kg (142 lb 10.2 oz)  BMI 27.86 kg/m2    Do you need any medication refills at today's visit? NO

## 2018-06-11 NOTE — PATIENT INSTRUCTIONS
[facility]  Kinjal Irving RN, Diabetes Educator: [phone]  Clinic Nurses Janet Pineda: [phone]  Clinic Fax: [phone]  On-Call Endocrine at [facility] (after hours/weekends): [phone] option 4  Scheduling Line: [phone]    Appointment Reminders:  * Please bring meter with for staff to download  * If you are due ONLY for an A1C, it is scheduled with the nurse and will be done in clinic. You do not need to schedule a lab appointment. Fasting is not required for an A1C.  * Refill request should be submitted to your pharmacy. They will contact clinic for approval.

## 2018-06-11 NOTE — MR AVS SNAPSHOT
After Visit Summary   6/11/2018    Lorrie Blake    MRN: [id number]           Patient Information     Date Of Birth          1960        Visit Information        Provider Department      6/11/2018 4:15 PM Froy Washington MD; MG MCKEON NURSE [facility]        Today's Diagnoses     Type 2 diabetes mellitus with mild nonproliferative retinopathy without macular edema, without long-term current use of insulin, unspecified laterality (H)    -  1    Overweight (BMI 25.0-29.9)          Care Instructions    [facility]-Department of Endocrinology  Kinjal Irving RN, Diabetes Educator: [phone]  Clinic Nurses Janet or Miriam: [phone]  Clinic Fax: [phone]  On-Call Endocrine at [facility] (after hours/weekends): [phone] option 4  Scheduling Line: [phone]    Appointment Reminders:  * Please bring meter with for staff to download  * If you are due ONLY for an A1C, it is scheduled with the nurse and will be done in clinic. You do not need to schedule a lab appointment. Fasting is not required for an A1C.  * Refill request should be submitted to your pharmacy. They will contact clinic for approval.                Follow-ups after your visit        Follow-up notes from your care team     Return in about 5 months (around 11/11/2018).      Your next 10 appointments already scheduled     Nov 12, 2018  4:15 PM CST   Return Visit with Froy Washington MD, MG MCKEON NURSE   [facility] ([facility])    [id number] [place] [id number] [phone]              Future tests that were ordered for you today     Open Standing Orders        Priority Remaining Interval Expires Ordered    Hemoglobin A1c POCT Routine 3/4 Q 3 MO 6/11/2019 6/11/2018            Who to contact     If you have questions or need follow up information about today's clinic visit or your schedule please contact [facility] directly at [phone].  Normal or non-critical lab and imaging results will be communicated to you by MyChart, letter or phone within 4 business days after the clinic has received the results. If you do not hear from us within 7 days, please contact the clinic through MyChart or phone. If you have a critical or abnormal lab result, we will notify you by phone as soon as possible.  Submit refill requests through [employer] or call your pharmacy and they will forward the refill request to us. Please allow 3 business days for your refill to be completed.          Additional Information About Your Visit        Care EveryWhere ID     This is your Care EveryWhere ID. This could be used by other organizations to access your [facility] medical records  [id number]        Your Vitals Were     Pulse Height Pulse Oximetry BMI (Body Mass Index)          108 1.524 m (5') 99% 27.86 kg/m2         Blood Pressure from Last 3 Encounters:   06/11/18 (!) 131/93   01/22/18 (!) 158/108   08/28/17 (!) 147/104    Weight from Last 3 Encounters:   06/11/18 64.7 kg (142 lb 10.2 oz)   01/22/18 62.7 kg (138 lb 2 oz)   08/28/17 59.1 kg (130 lb 3.2 oz)              We Performed the Following     Hemoglobin A1c POCT          Today's Medication Changes          These changes are accurate as of 6/11/18  4:45 PM.  If you have any questions, ask your nurse or doctor.               Start taking these medicines.        Dose/Directions    Phentermine HCl 8 MG Tabs   Used for:  Overweight (BMI 25.0-29.9)   Started by:  Froy Washington MD        Dose:  8 mg   Take 8 mg by mouth daily   Quantity:  30 tablet   Refills:  3            Where to get your medicines      Some of these will need a paper prescription and others can be bought over the counter.  Ask your nurse if you have questions.     Bring a paper prescription for each of these medications     Phentermine HCl 8 MG Tabs                Primary Care Provider    [facility]  [facility]       No address on file        Equal Access to Services     DONNA WU : Hadii isacc hyde nicole Lares, waestefaniada luqcarmen, qayulissata charleneakilamatt betancur, jose roberto lealjerrypretty max. So [facility] [phone].    ATENCIÓN: Si habla español, tiene a thornton disposición servicios gratuitos de asistencia lingüística. Albert al [phone].    We comply with applicable federal civil rights laws and [place] laws. We do not discriminate on the basis of race, color, national origin, age, disability, sex, sexual orientation, or gender identity.            Thank you!     Thank you for choosing [facility]  for your care. Our goal is always to provide you with excellent care. Hearing back from our patients is one way we can continue to improve our services. Please take a few minutes to complete the written survey that you may receive in the mail after your visit with us. Thank you!             Your Updated Medication List - Protect others around you: Learn how to safely use, store and throw away your medicines at www.disposemymeds.org.          This list is accurate as of 6/11/18  4:45 PM.  Always use your most recent med list.                   Brand Name Dispense Instructions for use Diagnosis    blood glucose monitoring test strip    no brand specified    100 strip    Use to test blood sugars 3 times daily or as directed. Accu-chek smartview test strips (has Marisela meter)        dapagliflozin 10 MG Tabs tablet    FARXIGA    90 tablet    Take one tablet by mouth daily        glipiZIDE 10 MG 24 hr tablet    GLUCOTROL XL    90 tablet    Take 1 tablet (10 mg) by mouth daily    Overweight (BMI 25.0-29.9)       LINZESS 290 MCG capsule   Generic drug:  linaclotide      Take 290 mcg by mouth every morning (before breakfast)        melatonin 3 MG tablet      Take 3 mg by mouth nightly as needed for sleep        METAMUCIL PO      Take by mouth daily    Overweight (BMI 25.0-29.9)       metFORMIN  500 MG tablet    GLUCOPHAGE    360 tablet    2 tablets with breakfast and 2 tablet with supper        OMEPRAZOLE PO      Take 40 mg by mouth every morning        Phentermine HCl 8 MG Tabs     30 tablet    Take 8 mg by mouth daily    Overweight (BMI 25.0-29.9)       topiramate 100 MG tablet    TOPAMAX    90 tablet    Take 1 tablet (100 mg) by mouth daily    Overweight (BMI 25.0-29.9)       VITAMIN D-3 PO      Take by mouth daily

## 2018-06-11 NOTE — PROGRESS NOTES
Endocrinology Note         Lorrie is a 57 year old female presents today for type 2 diabetes.    HPI  Lorrie Blake is a 57 years old female with hx of uncontrolled type 2 diabetes and retinopathy. Last seen 1/22/2018    She has had type 2 diabetes since 1997 and was initially treated with glyburide. She reported that sulfonylurea did not work for her and A1C has been persistenly >14 since 2012. She was also tried on different medications including Byetta, Victoza, Bydureon (it did not improve her glucose and it is expensive for her), insulin (it caused significant hypoglycemia and too expensive). In February 2015, c-peptide was 2.8 and negative insulin antibodies. At that time,she was then started on Farxiga and metformin. She does not want to be on GLP-1 agonist due to financial issue.      Interim history  A1C today is 8.0 %. Reviewed glucose meter over 7 days, average glucose 167. No hypoglycemia noted.  She is currently on glipizide XL 10 mg, Farxiga 10 mg daily and metformin 1000 mg bid.  She is taking topiramate 100 mg daily at night for weight loss. She is very frustrated due to multiple stresses, fatigue and inability to lose weight.She stated she has a lot of stress and has been very busy.  She has been very tired and had difficult time losing weight.  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 She said that she has not have time to eat or exercise. She has one meal a day at supper but snack on sweet at night.    She has had problem with her stomach and constipation. She has had bowel movement every 3-4 days and required to take Linzess.     DM complications:  Retinopathy: exam in 6/2017-- mild NPDR bilaterally  Nephropathy: negative for microalb in 8/2017  Neuropathy: denied   Aspirin 81 mg daily    Past Medical History  Type 2 diabetes with retinopathy  Schatzki's ring s/p esophageal dilatation    Allergies  Allergies   Allergen Reactions     Ace  Inhibitors Itching     Codeine      Medications  Current Outpatient Prescriptions   Medication Sig Dispense Refill     blood glucose monitoring (NO BRAND SPECIFIED) test strip Use to test blood sugars 3 times daily or as directed. Accu-chek smartview test strips (has Marisela meter) 100 strip 3     Cholecalciferol (VITAMIN D-3 PO) Take by mouth daily       dapagliflozin (FARXIGA) 10 MG TABS tablet Take one tablet by mouth daily 90 tablet 3     glipiZIDE (GLUCOTROL XL) 10 MG 24 hr tablet Take 1 tablet (10 mg) by mouth daily 90 tablet 3     linaclotide (LINZESS) 290 MCG capsule Take 290 mcg by mouth every morning (before breakfast)       melatonin 3 MG tablet Take 3 mg by mouth nightly as needed for sleep       metFORMIN (GLUCOPHAGE) 500 MG tablet 2 tablets with breakfast and 2 tablet with supper 360 tablet 3     OMEPRAZOLE PO Take 40 mg by mouth every morning        Psyllium (METAMUCIL PO) Take by mouth daily       topiramate (TOPAMAX) 100 MG tablet Take 1 tablet (100 mg) by mouth daily 90 tablet 2     Family History  Type 2 diabetes in father, mother and all siblings    Social History  Social History   Substance Use Topics     Smoking status: Never Smoker     Smokeless tobacco: Never Used     Alcohol use Not on file       ROS  Constitutional: + fatigue, inability to lose weight  Eyes: no vision change, diplopia or red eyes   Neck: +schatzki ring  Cardiovascular: no chest pain, palpitations  Respiratory: no dyspnea, cough, shortness of breath or wheezing   GI: no nausea, vomiting, diarrhea, + constipation, no abdominal pain   : no change in urine, no dysuria  Musculoskeletal: no joint or muscle pain or swelling   Integumentary: no concerning lesions   Neuro: no loss of strength or sensation, no numbness or tingling, no tremor, no dizziness, no headache   Endo: no polyuria or polydipsia, feeling cold intolerance   Heme/Lymph: no concerning bumps, no bleeding problems   Allergy: no environmental allergies   Psych:  +frustrated due to a lot of stresses     Physical Exam  BP (!) 131/93  Pulse 108  Ht 1.524 m (5')  Wt 64.7 kg (142 lb 10.2 oz)  SpO2 99%  BMI 27.86 kg/m2  Body mass index is 27.86 kg/(m^2).  Constitutional: no distress, comfortable, pleasant   Eyes: anicteric  Thyroid: firm thyroid, not enlarged  CVS: RRR, normal S1,S2, no murmur  Lungs: CTA B/L  Abd: soft, NT, ND, no hepatomegaly  Musculoskeletal: no edema   Skin:  no jaundice   Neurological: normal gait, intact monofilament sensation bilateral feet  Psychological: feels frustrated about her symptoms.    RESULTS  Lab Results   Component Value Date    A1C 8.0 06/11/2018    A1C 7.6 01/22/2018    A1C 9.2 08/28/2017    A1C 8.4 01/23/2017    A1C 7.9 08/01/2016       ENDO DIABETES Latest Ref Rng & Units 8/28/2017   CHOLESTEROL <200 mg/dL 219 (H)   LDL CHOLESTEROL, CALCULATED <100 mg/dL 127 (H)   HDL CHOLESTEROL >49 mg/dL 55   NON HDL CHOLESTEROL <130 mg/dL 164 (H)   TRIGLYCERIDES <150 mg/dL 183 (H)   ALBUMIN URINE MG/L mg/L 7   ALBUMIN URINE MG/G CR 0 - 25 mg/g Cr 10.46   CREATININE 0.52 - 1.04 mg/dL 0.66     ASSESSMENT:    Lorrie Blake is a 57 years old female with hx of uncontrolled type 2 diabetes and retinopathy who is here for follow up.    1) type 2 diabetes with retinopathy: A1C today is 8.0% which is worsening  - she is under a lot of stresses, lack of time for exercise. Also has been snacking at night  -I will continue glipizide XR 10 mg daily, metformin 1000 mg bid and Farxiga 10 mg daily.  - she does not want to do injectable medication due to cost.    2) DM complications:  Retinopathy: exam in 6/2017-- mild NPDR bilaterally  Nephropathy: negative for microalb in 8/28/2017  Neuropathy: intermittent - likely from topiramate    3) overweight: due to increased sweet craving. Still craves sweet. Irregular eating habit  -Continue topiramate 100 mg daily  -restart low dose phentermine 8 mg daily per patient's request. BP today is controlled.    4)  Hypertension: diastolic hypertension.  She does not want to be on any medication at this time.    PLAN:   - start phentermine 8 mg daily  - Continue with glipizide XR 10 mg daily  - continue Farxiga 10 mg and metformin 1000 mg bid  - continue topiramate 100 mg daily  - emphasize her to check blood glucose regularly.  - RTC 4-5 months to see me    Froy Washington MD  Endocrinology  [phone]

## 2018-06-18 ENCOUNTER — TELEPHONE (OUTPATIENT)
Dept: ENDOCRINOLOGY | Facility: CLINIC | Age: 58
End: 2018-06-18

## 2018-06-18 NOTE — TELEPHONE ENCOUNTER
PA completed for Lomaira (Phentermine) via Covermymeds.    Key [id number]    Will await determination.    Miriam Black LPN  Adult Endocrinology  [facility]

## 2018-06-20 NOTE — TELEPHONE ENCOUNTER
Notification received from [employer].  Additional PA.information needed.     PA completed and faxed to [phone].    Miriam Black LPN  Adult Endocrinology  [facility]

## 2018-06-22 NOTE — TELEPHONE ENCOUNTER
Per Padmini Message:  Please notify patient then. We can give her discount coupon (Good Rx).    Tasma     Left message for patient to return call.     Miriam Black LPN  Adult Endocrinology  [facility]

## 2018-06-22 NOTE — TELEPHONE ENCOUNTER
Patient notified. Discount card left at check in C for patient to  per patient request.    Miriam Black LPN  Adult Endocrinology  [facility]

## 2018-06-22 NOTE — TELEPHONE ENCOUNTER
Notification of denial received from [employer] [place].     Will forward update to Dr. Washington.    Miriam Black LPN  Adult Endocrinology  [facility]

## 2018-10-30 ENCOUNTER — TRANSFERRED RECORDS (OUTPATIENT)
Dept: HEALTH INFORMATION MANAGEMENT | Facility: CLINIC | Age: 58
End: 2018-10-30

## 2018-11-12 ENCOUNTER — OFFICE VISIT (OUTPATIENT)
Dept: ENDOCRINOLOGY | Facility: CLINIC | Age: 58
End: 2018-11-12
Payer: COMMERCIAL

## 2018-11-12 ENCOUNTER — OFFICE VISIT (OUTPATIENT)
Dept: FAMILY MEDICINE | Facility: CLINIC | Age: 58
End: 2018-11-12
Payer: COMMERCIAL

## 2018-11-12 VITALS
HEART RATE: 80 BPM | DIASTOLIC BLOOD PRESSURE: 94 MMHG | WEIGHT: 145.7 LBS | SYSTOLIC BLOOD PRESSURE: 151 MMHG | OXYGEN SATURATION: 97 % | BODY MASS INDEX: 28.46 KG/M2

## 2018-11-12 VITALS
BODY MASS INDEX: 28.66 KG/M2 | HEART RATE: 92 BPM | DIASTOLIC BLOOD PRESSURE: 95 MMHG | OXYGEN SATURATION: 98 % | HEIGHT: 60 IN | SYSTOLIC BLOOD PRESSURE: 142 MMHG | WEIGHT: 146 LBS | TEMPERATURE: 98 F

## 2018-11-12 DIAGNOSIS — Z12.31 VISIT FOR SCREENING MAMMOGRAM: ICD-10-CM

## 2018-11-12 DIAGNOSIS — I10 UNCONTROLLED STAGE 2 HYPERTENSION: ICD-10-CM

## 2018-11-12 DIAGNOSIS — E78.5 HYPERLIPIDEMIA LDL GOAL <100: Primary | ICD-10-CM

## 2018-11-12 DIAGNOSIS — E11.3299 TYPE 2 DIABETES MELLITUS WITH MILD NONPROLIFERATIVE RETINOPATHY WITHOUT MACULAR EDEMA, WITHOUT LONG-TERM CURRENT USE OF INSULIN, UNSPECIFIED LATERALITY (H): ICD-10-CM

## 2018-11-12 DIAGNOSIS — E66.3 OVERWEIGHT (BMI 25.0-29.9): ICD-10-CM

## 2018-11-12 DIAGNOSIS — K59.04 CHRONIC IDIOPATHIC CONSTIPATION: ICD-10-CM

## 2018-11-12 DIAGNOSIS — R41.3 MEMORY IMPAIRMENT: ICD-10-CM

## 2018-11-12 DIAGNOSIS — E78.5 HYPERLIPIDEMIA LDL GOAL <100: ICD-10-CM

## 2018-11-12 DIAGNOSIS — Z00.00 ANNUAL PHYSICAL EXAM: Primary | ICD-10-CM

## 2018-11-12 DIAGNOSIS — K20.0 EOSINOPHILIC ESOPHAGITIS: ICD-10-CM

## 2018-11-12 LAB
ALBUMIN SERPL-MCNC: 3.9 G/DL (ref 3.4–5)
ALP SERPL-CCNC: 82 U/L (ref 40–150)
ALT SERPL W P-5'-P-CCNC: 23 U/L (ref 0–50)
ANION GAP SERPL CALCULATED.3IONS-SCNC: 5 MMOL/L (ref 3–14)
AST SERPL W P-5'-P-CCNC: 14 U/L (ref 0–45)
BASOPHILS # BLD AUTO: 0 10E9/L (ref 0–0.2)
BASOPHILS NFR BLD AUTO: 0.7 %
BILIRUB SERPL-MCNC: 0.5 MG/DL (ref 0.2–1.3)
BUN SERPL-MCNC: 14 MG/DL (ref 7–30)
CALCIUM SERPL-MCNC: 8.9 MG/DL (ref 8.5–10.1)
CHLORIDE SERPL-SCNC: 110 MMOL/L (ref 94–109)
CHOLEST SERPL-MCNC: 236 MG/DL
CO2 SERPL-SCNC: 28 MMOL/L (ref 20–32)
CREAT SERPL-MCNC: 0.74 MG/DL (ref 0.52–1.04)
CREAT UR-MCNC: 105 MG/DL
DIFFERENTIAL METHOD BLD: NORMAL
EOSINOPHIL # BLD AUTO: 0.1 10E9/L (ref 0–0.7)
EOSINOPHIL NFR BLD AUTO: 3.1 %
ERYTHROCYTE [DISTWIDTH] IN BLOOD BY AUTOMATED COUNT: 14.5 % (ref 10–15)
GFR SERPL CREATININE-BSD FRML MDRD: 81 ML/MIN/1.7M2
GLUCOSE SERPL-MCNC: 130 MG/DL (ref 70–99)
HBA1C MFR BLD: 7.6 % (ref 0–5.6)
HCT VFR BLD AUTO: 40.5 % (ref 35–47)
HDLC SERPL-MCNC: 48 MG/DL
HGB BLD-MCNC: 13.2 G/DL (ref 11.7–15.7)
LDLC SERPL CALC-MCNC: 140 MG/DL
LYMPHOCYTES # BLD AUTO: 1.4 10E9/L (ref 0.8–5.3)
LYMPHOCYTES NFR BLD AUTO: 33.2 %
MCH RBC QN AUTO: 28.3 PG (ref 26.5–33)
MCHC RBC AUTO-ENTMCNC: 32.6 G/DL (ref 31.5–36.5)
MCV RBC AUTO: 87 FL (ref 78–100)
MICROALBUMIN UR-MCNC: 8 MG/L
MICROALBUMIN/CREAT UR: 8.01 MG/G CR (ref 0–25)
MONOCYTES # BLD AUTO: 0.3 10E9/L (ref 0–1.3)
MONOCYTES NFR BLD AUTO: 6.7 %
NEUTROPHILS # BLD AUTO: 2.4 10E9/L (ref 1.6–8.3)
NEUTROPHILS NFR BLD AUTO: 56.3 %
NONHDLC SERPL-MCNC: 188 MG/DL
PLATELET # BLD AUTO: 209 10E9/L (ref 150–450)
POTASSIUM SERPL-SCNC: 3.8 MMOL/L (ref 3.4–5.3)
PROT SERPL-MCNC: 7.3 G/DL (ref 6.8–8.8)
RBC # BLD AUTO: 4.67 10E12/L (ref 3.8–5.2)
SODIUM SERPL-SCNC: 143 MMOL/L (ref 133–144)
TRIGL SERPL-MCNC: 240 MG/DL
WBC # BLD AUTO: 4.2 10E9/L (ref 4–11)

## 2018-11-12 PROCEDURE — 82043 UR ALBUMIN QUANTITATIVE: CPT | Performed by: INTERNAL MEDICINE

## 2018-11-12 PROCEDURE — 99396 PREV VISIT EST AGE 40-64: CPT | Performed by: INTERNAL MEDICINE

## 2018-11-12 PROCEDURE — 36415 COLL VENOUS BLD VENIPUNCTURE: CPT | Performed by: INTERNAL MEDICINE

## 2018-11-12 PROCEDURE — 85025 COMPLETE CBC W/AUTO DIFF WBC: CPT | Performed by: INTERNAL MEDICINE

## 2018-11-12 PROCEDURE — 99214 OFFICE O/P EST MOD 30 MIN: CPT | Performed by: INTERNAL MEDICINE

## 2018-11-12 PROCEDURE — 80061 LIPID PANEL: CPT | Performed by: INTERNAL MEDICINE

## 2018-11-12 PROCEDURE — 80053 COMPREHEN METABOLIC PANEL: CPT | Performed by: INTERNAL MEDICINE

## 2018-11-12 PROCEDURE — 99213 OFFICE O/P EST LOW 20 MIN: CPT | Mod: 25 | Performed by: INTERNAL MEDICINE

## 2018-11-12 PROCEDURE — 83036 HEMOGLOBIN GLYCOSYLATED A1C: CPT | Performed by: INTERNAL MEDICINE

## 2018-11-12 RX ORDER — LOSARTAN POTASSIUM 50 MG/1
50 TABLET ORAL DAILY
Qty: 30 TABLET | Refills: 5 | Status: SHIPPED | OUTPATIENT
Start: 2018-11-12 | End: 2019-03-10

## 2018-11-12 RX ORDER — ROSUVASTATIN CALCIUM 5 MG/1
5 TABLET, COATED ORAL DAILY
Qty: 90 TABLET | Refills: 3 | Status: SHIPPED | OUTPATIENT
Start: 2018-11-12 | End: 2020-01-15

## 2018-11-12 RX ORDER — TOPIRAMATE 100 MG/1
100 TABLET, FILM COATED ORAL DAILY
Qty: 90 TABLET | Refills: 3 | Status: SHIPPED | OUTPATIENT
Start: 2018-11-12 | End: 2019-07-22

## 2018-11-12 RX ORDER — PANTOPRAZOLE SODIUM 40 MG/1
40 TABLET, DELAYED RELEASE ORAL DAILY
Qty: 90 TABLET | Refills: 3 | Status: SHIPPED | OUTPATIENT
Start: 2018-11-12 | End: 2019-04-05

## 2018-11-12 RX ORDER — CALCIUM CARBONATE 500 MG/1
2 TABLET, CHEWABLE ORAL
COMMUNITY
End: 2020-07-02

## 2018-11-12 RX ORDER — PHENTERMINE HYDROCHLORIDE 30 MG/1
30 CAPSULE ORAL EVERY MORNING
Qty: 30 CAPSULE | Refills: 5 | Status: SHIPPED | OUTPATIENT
Start: 2018-11-12 | End: 2018-11-12

## 2018-11-12 ASSESSMENT — PAIN SCALES - GENERAL: PAINLEVEL: NO PAIN (0)

## 2018-11-12 NOTE — PROGRESS NOTES
Endocrinology Note         Lorrie is a 57 year old female presents today for type 2 diabetes.    HPI  Lorrie Blake is a 57 years old female with hx of uncontrolled type 2 diabetes and retinopathy. Last seen 6/2018    She has had type 2 diabetes since 1997 and was initially treated with glyburide. She reported that sulfonylurea did not work for her and A1C has been persistenly >14 since 2012. She was also tried on different medications including Byetta, Victoza, Bydureon (it did not improve her glucose and it is expensive for her), insulin (it caused significant hypoglycemia and too expensive). In February 2015, c-peptide was 2.8 and negative insulin antibodies. At that time,she was then started on Farxiga and metformin. She does not want to be on GLP-1 agonist due to financial issue.      Interim history  She said that she did have blurred vision of the left eye last week so she went to see to vitreoretinal specialist and found to have blood clot in the left eye. She did have an injection with improvement of her eye sight.     She said that she has been very stressful lately with her job and life. Her stomach is not good. She has always felt nauseated, acid reflux and constipation. She reports that she could eat only 1 meal per day at 4 pm. Her diet is usually potato, or hamburger. She could not tolerate a lot of vegetable. She has not have time to eat or exercise. She has been very tired and had difficult time losing weight.    A1C today is 7.6 %. Reviewed glucose meter, she did not check BG very often. Average glucose over the past 3 days was 140 (SD 39). No hypoglycemia noted.  She is currently on glipizide XL 10 mg, Farxiga 10 mg daily and metformin 1000 mg bid.  She is taking topiramate 100 mg daily at night for weight loss. She does not take phentermine anymore because she does not feel that it is helping.        DM complications:  Retinopathy: exam in 11/2018-  NPDR bilaterally.   Nephropathy:  negative for microalb in 8/2017  Neuropathy: denied   Aspirin 81 mg daily   11/12/18    Past Medical History  Type 2 diabetes with retinopathy  Schatzki's ring s/p esophageal dilatation    Allergies  Allergies   Allergen Reactions     Ace Inhibitors Itching     Codeine      Medications  Current Outpatient Prescriptions   Medication Sig Dispense Refill     blood glucose monitoring (ACCU-CHEK FASTCLIX) lancets Use to test blood sugar 1-2 times daily or as directed. 102 each 3     blood glucose monitoring (NO BRAND SPECIFIED) test strip Use to test blood sugars 3 times daily or as directed. Accu-chek smartview test strips (has Marisela meter) 100 strip 3     dapagliflozin (FARXIGA) 10 MG TABS tablet Take one tablet by mouth daily 90 tablet 3     glipiZIDE (GLUCOTROL XL) 10 MG 24 hr tablet Take 1 tablet (10 mg) by mouth daily 90 tablet 3     linaclotide (LINZESS) 290 MCG capsule Take 1 capsule (290 mcg) by mouth every morning (before breakfast) 90 capsule 3     losartan (COZAAR) 50 MG tablet Take 1 tablet (50 mg) by mouth daily 30 tablet 5     metFORMIN (GLUCOPHAGE) 500 MG tablet 2 tablets with breakfast and 2 tablet with supper 360 tablet 3     OMEPRAZOLE PO Take 40 mg by mouth every morning        pantoprazole (PROTONIX) 40 MG EC tablet Take 1 tablet (40 mg) by mouth daily 90 tablet 3     phentermine 30 MG capsule Take 1 capsule (30 mg) by mouth every morning 30 capsule 5     topiramate (TOPAMAX) 100 MG tablet Take 1 tablet (100 mg) by mouth daily 90 tablet 3     UNABLE TO FIND MEDICATION NAME: Nanda Cardenas PM       [DISCONTINUED] topiramate (TOPAMAX) 100 MG tablet Take 1 tablet (100 mg) by mouth daily 90 tablet 2     Family History  Type 2 diabetes in father, mother and all siblings    Social History  Social History   Substance Use Topics     Smoking status: Never Smoker     Smokeless tobacco: Never Used     Alcohol use Not on file       ROS  Constitutional: + fatigue, inability to lose weight  Eyes: +blurried  vision left eye s/p injection last week  Neck: +schatzki ring  Cardiovascular: no chest pain, palpitations  Respiratory: no dyspnea, cough, shortness of breath or wheezing   GI: + nausea, vomiting, diarrhea, + constipation, no abdominal pain   : no change in urine, no dysuria  Musculoskeletal: no joint or muscle pain or swelling   Integumentary: no concerning lesions   Neuro: no loss of strength or sensation, no numbness or tingling, no tremor, no dizziness, no headache   Endo: no polyuria or polydipsia, feeling cold intolerance   Heme/Lymph: no concerning bumps, no bleeding problems   Allergy: no environmental allergies   Psych: +frustrated due to a lot of stresses     Physical Exam  BP (!) 151/94 (BP Location: Left arm, Patient Position: Sitting, Cuff Size: Adult Regular)  Pulse 80  Wt 66.1 kg (145 lb 11.2 oz)  SpO2 97%  BMI 28.46 kg/m2  Body mass index is 28.46 kg/(m^2).  Constitutional: no distress, comfortable, pleasant   Eyes: anicteric  Musculoskeletal: no edema   Skin:  no jaundice   Neurological: normal gait, intact monofilament sensation bilateral feet (6/2018)  Psychological: feels frustrated about her symptoms.    RESULTS  Lab Results   Component Value Date    A1C 7.6 11/12/2018    A1C 8.0 06/11/2018    A1C 7.6 01/22/2018    A1C 9.2 08/28/2017    A1C 8.4 01/23/2017     ENDO DIABETES Latest Ref Rng & Units 11/12/2018   CHOLESTEROL <200 mg/dL 236 (H)   LDL CHOLESTEROL, CALCULATED <100 mg/dL 140 (H)   HDL CHOLESTEROL >49 mg/dL 48 (L)   NON HDL CHOLESTEROL <130 mg/dL 188 (H)   TRIGLYCERIDES <150 mg/dL 240 (H)   ALBUMIN URINE MG/L mg/L 8   ALBUMIN URINE MG/G CR 0 - 25 mg/g Cr 8.01   CREATININE 0.52 - 1.04 mg/dL 0.74   POTASSIUM 3.4 - 5.3 mmol/L 3.8   ALT 0 - 50 U/L 23   AST 0 - 45 U/L 14     ASSESSMENT:    Lorrie Blake is a 57 years old female with hx of uncontrolled type 2 diabetes and retinopathy who is here for follow up.    1) type 2 diabetes with retinopathy: A1C today is 7.6%.   - she is under  a lot of stresses, lack of time for exercise. Diet is not the best.  -I will continue glipizide XR 10 mg daily, metformin 1000 mg bid and Farxiga 10 mg daily.  - she does not want to do injectable medication due to cost.    2) DM complications:  Retinopathy: exam in 11/2018-- NPDR bilaterally, s/p injection left eye  Nephropathy: negative for microalb in 11/12/18  Neuropathy: intermittent - likely from topiramate    3) overweight: due to increased sweet craving. Still craves sweet. Irregular eating habit  -Continue topiramate 100 mg daily. No phentermine due to uncontrolled hypertension.    4) Hypertension: diastolic hypertension. Saw PCP today and started on losartan 50 mg dialy    5) hyperlipidemia: . Will start rosuvastain 5 mg. She reports having symptoms of myalgia with atorvastatin.    PLAN:   - follow up with PCP for hypertension  - start rosuvastatin 5 mg daily  - continue with glipizide XR 10 mg daily, Farxiga 10 mg and metformin 1000 mg bid  - continue topiramate 100 mg daily  - emphasize her to check blood glucose regularly.  - RTC 4 months to see me    Froy Washington MD  Endocrinology  [phone]

## 2018-11-12 NOTE — MR AVS SNAPSHOT
After Visit Summary   11/12/2018    Lorrie Blake    MRN: [id number]           Patient Information     Date Of Birth          1960        Visit Information        Provider Department      11/12/2018 4:15 PM Froy Washington MD; MG ENDO NURSE [facility]        Today's Diagnoses     Hyperlipidemia LDL goal <100    -  1    Type 2 diabetes mellitus with mild nonproliferative retinopathy without macular edema, without long-term current use of insulin, unspecified laterality (H)          Care Instructions    - start Crestor 5 mg daily  - continue diabetes medication  - take blood pressure medication regularly  - walk daily  - see your primary doctor for blood pressure  - see me in 4 months  - If you have any questions, please do not hesitate to call [facility] at [phone] and ask for Endocrinology clinic.    Sincerely,    Froy Washington MD  Endocrinology                Follow-ups after your visit        Follow-up notes from your care team     Return in about 4 months (around 3/12/2019).      Your next 10 appointments already scheduled     Nov 12, 2018  4:15 PM CST   Return Visit with Froy Washington MD, MG ENDO NURSE   [facility] ([facility])    [address] [place] [id number] [phone]            Mar 18, 2019  4:15 PM CDT   Return Visit with Froy Washington MD, MG ENDO NURSE   [facility])    [address] [place] [id number] [phone]              Future tests that were ordered for you today     Open Future Orders        Priority Expected Expires Ordered    Vitamin B12 Routine  11/12/2019 11/12/2018    Methylmalonic acid Routine  11/12/2019 11/12/2018    NEUROLOGY ADULT REFERRAL Routine  11/12/2019 11/12/2018    *MA Screening Digital Bilateral Routine  11/12/2019 11/12/2018            Who to  contact     If you have questions or need follow up information about today's clinic visit or your schedule please contact [facility] directly at [phone].  Normal or non-critical lab and imaging results will be communicated to you by MyChart, letter or phone within 4 business days after the clinic has received the results. If you do not hear from us within 7 days, please contact the clinic through MyChart or phone. If you have a critical or abnormal lab result, we will notify you by phone as soon as possible.  Submit refill requests through Xyt[employer] or call your pharmacy and they will forward the refill request to us. Please allow 3 business days for your refill to be completed.          Additional Information About Your Visit        Xytis Information     Xytis is an electronic gateway that provides easy, online access to your medical records. With Xytis, you can request a clinic appointment, read your test results, renew a prescription or communicate with your care team.     To sign up for Xytis visit the website at [email or web address]   You will be asked to enter the access code listed below, as well as some personal information. Please follow the directions to create your username and password.     Your access code is: [email or web address]  Expires: 2/10/2019  8:08 AM     Your access code will  in 90 days. If you need help or a new code, please contact your [facility] or call [phone] for assistance.        Care EveryWhere ID     This is your Care EveryWhere ID. This could be used by other organizations to access your [place] medical records  [id number]        Your Vitals Were     Pulse Pulse Oximetry BMI (Body Mass Index)             80 97% 28.46 kg/m2          Blood Pressure from Last 3 Encounters:   18 (!) 151/94   18 (!) 142/95   18 (!) 131/93    Weight from Last 3 Encounters:   18 66.1 kg (145 lb 11.2 oz)    11/12/18 66.2 kg (146 lb)   06/11/18 64.7 kg (142 lb 10.2 oz)              We Performed the Following     Hemoglobin A1c POCT          Today's Medication Changes          These changes are accurate as of 11/12/18  3:29 PM.  If you have any questions, ask your nurse or doctor.               Start taking these medicines.        Dose/Directions    losartan 50 MG tablet   Commonly known as:  COZAAR   Used for:  Uncontrolled stage 2 hypertension   Started by:  Wiliam Mosqueda MD        Dose:  50 mg   Take 1 tablet (50 mg) by mouth daily   Quantity:  30 tablet   Refills:  5       pantoprazole 40 MG EC tablet   Commonly known as:  PROTONIX   Used for:  Eosinophilic esophagitis   Started by:  Wiliam Mosqueda MD        Dose:  40 mg   Take 1 tablet (40 mg) by mouth daily   Quantity:  90 tablet   Refills:  3       rosuvastatin 5 MG tablet   Commonly known as:  CRESTOR   Used for:  Hyperlipidemia LDL goal <100   Started by:  Froy Washington MD        Dose:  5 mg   Take 1 tablet (5 mg) by mouth daily   Quantity:  90 tablet   Refills:  3         Stop taking these medicines if you haven't already. Please contact your care team if you have questions.     melatonin 3 MG tablet   Stopped by:  Wiliam Mosqueda MD           METAMUCIL PO   Stopped by:  Wiliam Mosqueda MD           Phentermine HCl 8 MG Tabs   Stopped by:  Wiliam Mosqueda MD           VITAMIN D-3 PO   Stopped by:  Wiliam Mosqueda MD                Where to get your medicines      These medications were sent to [id number] - [place], [place] - [address] AT [facility] OF [facility] [address]  [address] [facility])  [address], [place] [address]     Phone:  [phone]     linaclotide 290 MCG capsule    losartan 50 MG tablet    pantoprazole 40 MG EC tablet    rosuvastatin 5 MG tablet    topiramate 100 MG tablet                Primary Care Provider Office Phone # Fax #    Wiliam Mosqueda [phone]        [address]  [place] [facility] [address]        Equal Access to Services     KATNICHOLAS BRYCE : Hadii aad ku hadmikoo Soomaali, waaxda luqadaha, qaybta kaalmada duyenborismatt, waxay idiin hayloreleiivan lealjerrypretty max. So [facility] [phone].    ATENCIÓN: Si habla español, tiene a thornton disposición servicios gratuitos de asistencia lingüística. Llame al [phone].    We comply with applicable federal civil rights laws and [place] laws. We do not discriminate on the basis of race, color, national origin, age, disability, sex, sexual orientation, or gender identity.            Thank you!     Thank you for choosing [facility]  for your care. Our goal is always to provide you with excellent care. Hearing back from our patients is one way we can continue to improve our services. Please take a few minutes to complete the written survey that you may receive in the mail after your visit with us. Thank you!             Your Updated Medication List - Protect others around you: Learn how to safely use, store and throw away your medicines at www.disposemymeds.org.          This list is accurate as of 11/12/18  3:29 PM.  Always use your most recent med list.                   Brand Name Dispense Instructions for use Diagnosis    blood glucose monitoring lancets     102 each    Use to test blood sugar 1-2 times daily or as directed.    Type 2 diabetes mellitus with mild nonproliferative retinopathy without macular edema, without long-term current use of insulin, unspecified laterality (H)       blood glucose monitoring test strip    no brand specified    100 strip    Use to test blood sugars 3 times daily or as directed. Accu-chek smartview test strips (has Marisela meter)        calcium carbonate 500 MG chewable tablet    TUMS     Take 2 chew tab by mouth Patient take 4-8 tabs daily        dapagliflozin 10 MG Tabs tablet    FARXIGA    90 tablet    Take one tablet by mouth daily    Type 2 diabetes mellitus with mild  nonproliferative retinopathy without macular edema, without long-term current use of insulin, unspecified laterality (H)       glipiZIDE 10 MG 24 hr tablet    GLUCOTROL XL    90 tablet    Take 1 tablet (10 mg) by mouth daily    Overweight (BMI 25.0-29.9)       linaclotide 290 MCG capsule    LINZESS    90 capsule    Take 1 capsule (290 mcg) by mouth every morning (before breakfast)    Chronic idiopathic constipation       losartan 50 MG tablet    COZAAR    30 tablet    Take 1 tablet (50 mg) by mouth daily    Uncontrolled stage 2 hypertension       metFORMIN 500 MG tablet    GLUCOPHAGE    360 tablet    2 tablets with breakfast and 2 tablet with supper    Type 2 diabetes mellitus with mild nonproliferative retinopathy without macular edema, without long-term current use of insulin, unspecified laterality (H)       OMEPRAZOLE PO      Take 40 mg by mouth every morning        pantoprazole 40 MG EC tablet    PROTONIX    90 tablet    Take 1 tablet (40 mg) by mouth daily    Eosinophilic esophagitis       rosuvastatin 5 MG tablet    CRESTOR    90 tablet    Take 1 tablet (5 mg) by mouth daily    Hyperlipidemia LDL goal <100       topiramate 100 MG tablet    TOPAMAX    90 tablet    Take 1 tablet (100 mg) by mouth daily    Overweight (BMI 25.0-29.9)       UNABLE TO FIND      Take 2 each by mouth At Bedtime MEDICATION NAME: Nanda VALENZUELA

## 2018-11-12 NOTE — MR AVS SNAPSHOT
After Visit Summary   11/12/2018    Lorrie Blake    MRN: [id number]           Patient Information     Date Of Birth          1960        Visit Information        Provider Department      11/12/2018 7:00 AM Wiliam Mosqueda MD [facility]        Today's Diagnoses     Annual physical exam    -  1    CARDIOVASCULAR SCREENING; LDL GOAL LESS THAN 100        Visit for screening mammogram        Uncontrolled stage 2 hypertension        Eosinophilic esophagitis        Memory impairment        Overweight (BMI 25.0-29.9)        Chronic idiopathic constipation          Care Instructions      Preventive Health Recommendations  Female Ages 50 - 64    Yearly exam: See your health care provider every year in order to  o Review health changes.   o Discuss preventive care.    o Review your medicines if your doctor has prescribed any.      Get a Pap test every three years (unless you have an abnormal result and your provider advises testing more often).    If you get Pap tests with HPV test, you only need to test every 5 years, unless you have an abnormal result.     You do not need a Pap test if your uterus was removed (hysterectomy) and you have not had cancer.    You should be tested each year for STDs (sexually transmitted diseases) if you're at risk.     Have a mammogram every 1 to 2 years.    Have a colonoscopy at age 50, or have a yearly FIT test (stool test). These exams screen for colon cancer.      Have a cholesterol test every 5 years, or more often if advised.    Have a diabetes test (fasting glucose) every three years. If you are at risk for diabetes, you should have this test more often.     If you are at risk for osteoporosis (brittle bone disease), think about having a bone density scan (DEXA).    Shots: Get a flu shot each year. Get a tetanus shot every 10 years.    Nutrition:     Eat at least 5 servings of fruits and vegetables each day.    Eat whole-grain bread,  whole-wheat pasta and brown rice instead of white grains and rice.    Get adequate Calcium and Vitamin D.     Lifestyle    Exercise at least 150 minutes a week (30 minutes a day, 5 days a week). This will help you control your weight and prevent disease.    Limit alcohol to one drink per day.    No smoking.     Wear sunscreen to prevent skin cancer.     See your dentist every six months for an exam and cleaning.    See your eye doctor every 1 to 2 years.            Follow-ups after your visit        Additional Services     NEUROLOGY ADULT REFERRAL       Your provider has referred you for the following:   Consult at [facility]: [facility] [phone]   [email or web address]    Please be aware that coverage of these services is subject to the terms and limitations of your health insurance plan.  Call member services at your health plan with any benefit or coverage questions.      Please bring the following with you to your appointment:    (1) Any X-Rays, CTs or MRIs which have been performed.  Contact the facility where they were done to arrange for  prior to your scheduled appointment.    (2) List of current medications  (3) This referral request   (4) Any documents/labs given to you for this referral                  Follow-up notes from your care team     Return in about 4 weeks (around 12/10/2018).      Your next 10 appointments already scheduled     Nov 12, 2018  4:15 PM CST   Return Visit with Froy Washington MD, MG ENDO NURSE   [facility]    [id number] [place] [id number] [phone]              Future tests that were ordered for you today     Open Future Orders        Priority Expected Expires Ordered    Vitamin B12 Routine  11/12/2019 11/12/2018    Methylmalonic acid Routine  11/12/2019 11/12/2018    NEUROLOGY ADULT REFERRAL Routine  11/12/2019  "2018    *MA Screening Digital Bilateral Routine  2019            Who to contact     If you have questions or need follow up information about today's clinic visit or your schedule please contact [facility] directly at [phone].  Normal or non-critical lab and imaging results will be communicated to you by MyChart, letter or phone within 4 business days after the clinic has received the results. If you do not hear from us within 7 days, please contact the clinic through MyChart or phone. If you have a critical or abnormal lab result, we will notify you by phone as soon as possible.  Submit refill requests through [email or web address] or call your pharmacy and they will forward the refill request to us. Please allow 3 business days for your refill to be completed.          Additional Information About Your Visit        MyChart Information     [email or web address] lets you send messages to your doctor, view your test results, renew your prescriptions, schedule appointments and more. To sign up, go to [email or web address] . Click on \"Log in\" on the left side of the screen, which will take you to the Welcome page. Then click on \"Sign up Now\" on the right side of the page.     You will be asked to enter the access code listed below, as well as some personal information. Please follow the directions to create your username and password.     Your access code is: [email or web address]  Expires: 2/10/2019  8:08 AM     Your access code will  in 90 days. If you need help or a new code, please call your [facility] or [phone].        Care EveryWhere ID     This is your Care EveryWhere ID. This could be used by other organizations to access your [facility] medical records  [id number]        Your Vitals Were     Pulse Temperature Height Pulse Oximetry Breastfeeding? BMI (Body Mass Index)    92 98  F (36.7  C) (Oral) 5' (1.524 m) 98% No 28.51 kg/m2       Blood Pressure from Last 3 Encounters: "   11/12/18 (!) 142/95   06/11/18 (!) 131/93   01/22/18 (!) 158/108    Weight from Last 3 Encounters:   11/12/18 146 lb (66.2 kg)   06/11/18 142 lb 10.2 oz (64.7 kg)   01/22/18 138 lb 2 oz (62.7 kg)              We Performed the Following     Albumin Random Urine Quantitative with Creat Ratio     CBC with platelets and differential     Comprehensive metabolic panel (BMP + Alb, Alk Phos, ALT, AST, Total. Bili, TP)     Lipid Profile (Chol, Trig, HDL, LDL calc)          Today's Medication Changes          These changes are accurate as of 11/12/18  8:08 AM.  If you have any questions, ask your nurse or doctor.               Start taking these medicines.        Dose/Directions    losartan 50 MG tablet   Commonly known as:  COZAAR   Used for:  Uncontrolled stage 2 hypertension   Started by:  Wiliam Mosqueda MD        Dose:  50 mg   Take 1 tablet (50 mg) by mouth daily   Quantity:  30 tablet   Refills:  5       pantoprazole 40 MG EC tablet   Commonly known as:  PROTONIX   Used for:  Eosinophilic esophagitis   Started by:  Wiliam Mosqueda MD        Dose:  40 mg   Take 1 tablet (40 mg) by mouth daily   Quantity:  90 tablet   Refills:  3       phentermine 30 MG capsule   Used for:  Overweight (BMI 25.0-29.9)   Replaces:  Phentermine HCl 8 MG Tabs   Started by:  Wiliam Mosqueda MD        Dose:  30 mg   Take 1 capsule (30 mg) by mouth every morning   Quantity:  30 capsule   Refills:  5         Stop taking these medicines if you haven't already. Please contact your care team if you have questions.     melatonin 3 MG tablet   Stopped by:  Wiliam Mosqueda MD           METAMUCIL PO   Stopped by:  Wiliam Mosqueda MD           Phentermine HCl 8 MG Tabs   Replaced by:  phentermine 30 MG capsule   Stopped by:  Wiliam Mosqueda MD           VITAMIN D-3 PO   Stopped by:  Wiliam Mosqueda MD                Where to get your medicines      These medications were sent to [id number] -  [place], [place] - [address]  ( [address])  [address], [place] [address]     Phone:  [phone]     linaclotide 290 MCG capsule    losartan 50 MG tablet    pantoprazole 40 MG EC tablet    topiramate 100 MG tablet         Some of these will need a paper prescription and others can be bought over the counter.  Ask your nurse if you have questions.     Bring a paper prescription for each of these medications     phentermine 30 MG capsule                Primary Care Provider    [facility]       No address on file        Equal Access to Services     DONNA WU : Hadmike hernandezo Sochema, waaxda luqadaha, qaybta kaalmada adeegyamatt, jose roberto blount . So [facility] [phone].    ATENCIÓN: Si habla español, tiene a thornton disposición servicios gratuitos de asistencia lingüística. [facility] [phone].    We comply with applicable federal civil rights laws and [place] laws. We do not discriminate on the basis of race, color, national origin, age, disability, sex, sexual orientation, or gender identity.            Thank you!     Thank you for choosing [facility]  for your care. Our goal is always to provide you with excellent care. Hearing back from our patients is one way we can continue to improve our services. Please take a few minutes to complete the written survey that you may receive in the mail after your visit with us. Thank you!             Your Updated Medication List - Protect others around you: Learn how to safely use, store and throw away your medicines at www.disposemymeds.org.          This list is accurate as of 11/12/18  8:08 AM.  Always use your most recent med list.                   Brand Name Dispense Instructions for use Diagnosis    blood glucose monitoring lancets     102 each    Use to test blood sugar 1-2 times daily or as directed.    Type 2 diabetes mellitus with mild nonproliferative  retinopathy without macular edema, without long-term current use of insulin, unspecified laterality (H)       blood glucose monitoring test strip    no brand specified    100 strip    Use to test blood sugars 3 times daily or as directed. Accu-chek smartview test strips (has Marisela meter)        dapagliflozin 10 MG Tabs tablet    FARXIGA    90 tablet    Take one tablet by mouth daily    Type 2 diabetes mellitus with mild nonproliferative retinopathy without macular edema, without long-term current use of insulin, unspecified laterality (H)       glipiZIDE 10 MG 24 hr tablet    GLUCOTROL XL    90 tablet    Take 1 tablet (10 mg) by mouth daily    Overweight (BMI 25.0-29.9)       linaclotide 290 MCG capsule    LINZESS    90 capsule    Take 1 capsule (290 mcg) by mouth every morning (before breakfast)    Chronic idiopathic constipation       losartan 50 MG tablet    COZAAR    30 tablet    Take 1 tablet (50 mg) by mouth daily    Uncontrolled stage 2 hypertension       metFORMIN 500 MG tablet    GLUCOPHAGE    360 tablet    2 tablets with breakfast and 2 tablet with supper    Type 2 diabetes mellitus with mild nonproliferative retinopathy without macular edema, without long-term current use of insulin, unspecified laterality (H)       OMEPRAZOLE PO      Take 40 mg by mouth every morning        pantoprazole 40 MG EC tablet    PROTONIX    90 tablet    Take 1 tablet (40 mg) by mouth daily    Eosinophilic esophagitis       phentermine 30 MG capsule     30 capsule    Take 1 capsule (30 mg) by mouth every morning    Overweight (BMI 25.0-29.9)       topiramate 100 MG tablet    TOPAMAX    90 tablet    Take 1 tablet (100 mg) by mouth daily    Overweight (BMI 25.0-29.9)       UNABLE TO FIND      MEDICATION NAME: Nanda VALENZUELA

## 2018-11-12 NOTE — NURSING NOTE
Lorrie Blake's goals for this visit include: follow up diabetes  She requests these members of her care team be copied on today's visit information: Yes    PCP: Wiliam Mosqueda    Referring Provider:  No referring provider defined for this encounter.    BP (!) 151/94 (BP Location: Left arm, Patient Position: Sitting, Cuff Size: Adult Regular)  Pulse 80  Wt 66.1 kg (145 lb 11.2 oz)  SpO2 97%  BMI 28.46 kg/m2    Do you need any medication refills at today's visit? No

## 2018-11-12 NOTE — PROGRESS NOTES
SUBJECTIVE:   CC: Lorrie Blake is an 57 year old woman who presents for preventive health visit.     Healthy Habits:    Do you get at least three servings of calcium containing foods daily (dairy, green leafy vegetables, etc.)? no    Amount of exercise or daily activities, outside of work: none    Problems taking medications regularly No    Medication side effects: No    Have you had an eye exam in the past two years? yes    Do you see a dentist twice per year? yes    Do you have sleep apnea, excessive snoring or daytime drowsiness?no    Today's PHQ-2 Score:   PHQ-2 ( 1999 Pfizer) 11/12/2018 6/11/2018   Q1: Little interest or pleasure in doing things 0 0   Q2: Feeling down, depressed or hopeless 0 0   PHQ-2 Score 0 0     ADDITIONAL CONCERNS        The patient came in today for an annual physical and to establish care. She also has additional concerns. The patient has been complaining of blurred vision (left eye). She was told that it is most likely due to uncontrolled blood pressure; she does not take any medications for it. Mrs. Blake has type 2 diabetes, followed closely by Dr. Lee. She stated to this provider that she is not interested in using insulin. The patient is also worried about her memory impairment. No history of CVA nor hypothyroidism.    Abuse: Current or Past(Physical, Sexual or Emotional)- No  Do you feel safe in your environment - Yes    Social History   Substance Use Topics     Smoking status: Never Smoker     Smokeless tobacco: Never Used     Alcohol use Not on file     If you drink alcohol do you typically have >3 drinks per day or >7 drinks per week? No                     Reviewed orders with patient.  Reviewed health maintenance and updated orders accordingly - Yes  Labs reviewed in EPIC  BP Readings from Last 3 Encounters:   11/12/18 (!) 151/94   11/12/18 (!) 142/95   06/11/18 (!) 131/93    Wt Readings from Last 3 Encounters:   11/12/18 145 lb 11.2 oz (66.1 kg)    11/12/18 146 lb (66.2 kg)   06/11/18 142 lb 10.2 oz (64.7 kg)                  Patient Active Problem List   Diagnosis     Type 2 diabetes mellitus (H)     Overweight (BMI 25.0-29.9)     Fatigue, unspecified type     Type 2 diabetes mellitus with mild nonproliferative retinopathy without macular edema, without long-term current use of insulin, unspecified laterality (H)     No past surgical history on file.    Social History   Substance Use Topics     Smoking status: Never Smoker     Smokeless tobacco: Never Used     Alcohol use Not on file     No family history on file.      Allergies   Allergen Reactions     Ace Inhibitors Itching     Codeine      Lisinopril Nausea and Cough     Recent Labs   Lab Test  11/12/18   0811 11/12/18 06/11/18 01/22/18   1612 01/22/18 08/28/17   1616   04/06/16   1018   02/27/15   1515   A1C   --   7.6*  8.0   --   7.6   --    < >   --    < >   --    LDL  140*   --    --    --    --   127*   --   133*   --    --    HDL  48*   --    --    --    --   55   --   64   --    --    TRIG  240*   --    --    --    --   183*   --   144   --    --    ALT  23   --    --    --    --    --    --    --    --    --    CR  0.74   --    --    --    --   0.66   --   0.77   < >  0.69   GFRESTIMATED  81   --    --    --    --   >90   --   78   < >  89   GFRESTBLACK  >90   --    --    --    --   >90   --   >90   GFR Calc     < >  >90   GFR Calc     POTASSIUM  3.8   --    --    --    --    --    --    --    --   4.2   TSH   --    --    --   1.07   --    --    --    --    --    --     < > = values in this interval not displayed.        Patient over age 50, mutual decision to screen reflected in health maintenance.    Pertinent mammograms are reviewed under the imaging tab.  History of abnormal Pap smear: NO - age 30-65 PAP every 5 years with negative HPV co-testing recommended     Reviewed and updated as needed this visit by clinical staff  Tobacco  Allergies  Meds          Reviewed and updated as needed this visit by Provider      ROS:  CONSTITUTIONAL: NEGATIVE for fever, chills, change in weight  INTEGUMENTARY/SKIN: NEGATIVE for worrisome rashes, moles or lesions  EYES: NEGATIVE for vision changes or irritation  ENT: NEGATIVE for ear, mouth and throat problems  RESP: NEGATIVE for significant cough or SOB  CV: NEGATIVE for chest pain, palpitations or peripheral edema  GI: NEGATIVE for nausea, abdominal pain, heartburn, or change in bowel habits  : NEGATIVE for unusual urinary or vaginal symptoms. No vaginal bleeding.  MUSCULOSKELETAL: NEGATIVE for significant arthralgias or myalgia  NEURO: NEGATIVE for weakness, dizziness or paresthesias  PSYCHIATRIC: NEGATIVE for changes in mood or affect     OBJECTIVE:   /90  Pulse 92  Temp 98  F (36.7  C) (Oral)  Ht 5' (1.524 m)  Wt 146 lb (66.2 kg)  SpO2 98%  Breastfeeding? No  BMI 28.51 kg/m2  EXAM:  GENERAL: healthy, alert and no distress  EYES: Eyes grossly normal to inspection, PERRL and conjunctivae and sclerae normal  NECK: no adenopathy, no asymmetry, masses, or scars and thyroid normal to palpation  RESP: lungs clear to auscultation - no rales, rhonchi or wheezes  CV: regular rate and rhythm, normal S1 S2, no S3 or S4, no murmur, click or rub, no peripheral edema and peripheral pulses strong  ABDOMEN: soft, nontender, no hepatosplenomegaly, no masses and bowel sounds normal  MS: no gross musculoskeletal defects noted, no edema  SKIN: no suspicious lesions or rashes  NEURO: Normal strength and tone, mentation intact and speech normal  PSYCH: Fatigued but cooperative.    Diagnostic Test Results:  Results for orders placed or performed in visit on 11/12/18   Comprehensive metabolic panel (BMP + Alb, Alk Phos, ALT, AST, Total. Bili, TP)   Result Value Ref Range    Sodium 143 133 - 144 mmol/L    Potassium 3.8 3.4 - 5.3 mmol/L    Chloride 110 (H) 94 - 109 mmol/L    Carbon Dioxide 28 20 - 32 mmol/L    Anion Gap 5 3 - 14 mmol/L     Glucose 130 (H) 70 - 99 mg/dL    Urea Nitrogen 14 7 - 30 mg/dL    Creatinine 0.74 0.52 - 1.04 mg/dL    GFR Estimate 81 >60 mL/min/1.7m2    GFR Estimate If Black >90 >60 mL/min/1.7m2    Calcium 8.9 8.5 - 10.1 mg/dL    Bilirubin Total 0.5 0.2 - 1.3 mg/dL    Albumin 3.9 3.4 - 5.0 g/dL    Protein Total 7.3 6.8 - 8.8 g/dL    Alkaline Phosphatase 82 40 - 150 U/L    ALT 23 0 - 50 U/L    AST 14 0 - 45 U/L   CBC with platelets and differential   Result Value Ref Range    WBC 4.2 4.0 - 11.0 10e9/L    RBC Count 4.67 3.8 - 5.2 10e12/L    Hemoglobin 13.2 11.7 - 15.7 g/dL    Hematocrit 40.5 35.0 - 47.0 %    MCV 87 78 - 100 fl    MCH 28.3 26.5 - 33.0 pg    MCHC 32.6 31.5 - 36.5 g/dL    RDW 14.5 10.0 - 15.0 %    Platelet Count 209 150 - 450 10e9/L    % Neutrophils 56.3 %    % Lymphocytes 33.2 %    % Monocytes 6.7 %    % Eosinophils 3.1 %    % Basophils 0.7 %    Absolute Neutrophil 2.4 1.6 - 8.3 10e9/L    Absolute Lymphocytes 1.4 0.8 - 5.3 10e9/L    Absolute Monocytes 0.3 0.0 - 1.3 10e9/L    Absolute Eosinophils 0.1 0.0 - 0.7 10e9/L    Absolute Basophils 0.0 0.0 - 0.2 10e9/L    Diff Method Automated Method    Albumin Random Urine Quantitative with Creat Ratio   Result Value Ref Range    Creatinine Urine 105 mg/dL    Albumin Urine mg/L 8 mg/L    Albumin Urine mg/g Cr 8.01 0 - 25 mg/g Cr   Lipid Profile (Chol, Trig, HDL, LDL calc)   Result Value Ref Range    Cholesterol 236 (H) <200 mg/dL    Triglycerides 240 (H) <150 mg/dL    HDL Cholesterol 48 (L) >49 mg/dL    LDL Cholesterol Calculated 140 (H) <100 mg/dL    Non HDL Cholesterol 188 (H) <130 mg/dL       ASSESSMENT/PLAN:   (Z00.00) Annual physical exam  (primary encounter diagnosis)  Comment: High risk of atherosclerotic heart disease due to uncontrolled hyperlipidemia and diabetes (patient refuses insulin).  Plan: Comprehensive metabolic panel (BMP + Alb, Alk         Phos, ALT, AST, Total. Bili, TP), CBC with         platelets and differential            (E78.5)) Hyperlipidemia  LDL goal < 100  Comment: LDL is above goal range. Defer to Dr. Hoskins.  Plan: Lipid Profile (Chol, Trig, HDL, LDL calc)            (Z12.31) Visit for screening mammogram  Comment: Overdue for test.  Plan: *MA Screening Digital Bilateral            (I10) Uncontrolled stage 2 hypertension  Comment: Previously untreated. No microalbuminuria.  Plan: Albumin Random Urine Quantitative with Creat         Ratio, losartan (COZAAR) 50 MG tablet            (K20.0) Eosinophilic esophagitis  Comment: Suspected cause of patient's dysphagia. Based on EGD last 5/24/16.  Plan: pantoprazole (PROTONIX) 40 MG EC tablet            (R41.3) Memory impairment  Comment: Most likely due to anxiety but rule out reversible causes. Thyroid function test last 1/22/18 are normal. She will benefit from Neuropsychology referral but patient has stated earlier in the visit that she does not have any time due to busy schedule.  Plan: Vitamin B12, Methylmalonic acid, NEUROLOGY         ADULT REFERRAL            (E66.3) Overweight (BMI 25.0-29.9)  Comment:   Plan: topiramate (TOPAMAX) 100 MG tablet,         DISCONTINUED: phentermine 30 MG capsule            (K59.04) Chronic idiopathic constipation  Comment:   Plan: linaclotide (LINZESS) 290 MCG capsule              COUNSELING:   Special attention given to:        Regular exercise       Healthy diet/nutrition       The 10-year ASCVD risk score (Kenneth SANTOSH Jr, et al., 2013) is: 11.1%    Values used to calculate the score:      Age: 57 years      Sex: Female      Is Non- : No      Diabetic: Yes      Tobacco smoker: No      Systolic Blood Pressure: 151 mmHg      Is BP treated: Yes      HDL Cholesterol: 48 mg/dL      Total Cholesterol: 236 mg/dL    BP Readings from Last 1 Encounters:   11/12/18 160/90     Estimated body mass index is 28.51 kg/(m^2) as calculated from the following:    Height as of this encounter: 5' (1.524 m).    Weight as of this encounter: 146 lb (66.2  kg).    BP Screening:   Last 3 BP Readings:    BP Readings from Last 3 Encounters:   11/12/18 (!) 151/94   11/12/18 (!) 142/95   06/11/18 (!) 131/93       The following was recommended to the patient:  Anti-hypertensive phramacology therapy  Weight management plan: pharmacotherapy.     reports that she has never smoked. She has never used smokeless tobacco.      Counseling Resources:  ATP IV Guidelines  Pooled Cohorts Equation Calculator  Breast Cancer Risk Calculator  FRAX Risk Assessment  ICSI Preventive Guidelines  Dietary Guidelines for Americans, 2010  USDA's MyPlate  ASA Prophylaxis  Lung CA Screening    Wiliam Mosqueda MD  [facility]

## 2018-11-12 NOTE — LETTER
11/12/2018         RE: Lorrie Blake  [address]  [place] [address]        Dear Colleague,    Thank you for referring your patient, Lorrie Blake, to the [facility]. Please see a copy of my visit note below.         Endocrinology Note         Lorrie is a 57 year old female presents today for type 2 diabetes.    HPI  Lorrie Blake is a 57 years old female with hx of uncontrolled type 2 diabetes and retinopathy. Last seen 6/2018    She has had type 2 diabetes since 1997 and was initially treated with glyburide. She reported that sulfonylurea did not work for her and A1C has been persistenly >14 since 2012. She was also tried on different medications including Byetta, Victoza, Bydureon (it did not improve her glucose and it is expensive for her), insulin (it caused significant hypoglycemia and too expensive). In February 2015, c-peptide was 2.8 and negative insulin antibodies. At that time,she was then started on Farxiga and metformin. She does not want to be on GLP-1 agonist due to financial issue.      Interim history  She said that she did have blurred vision of the left eye last week so she went to see to vitreoretinal specialist and found to have blood clot in the left eye. She did have an injection with improvement of her eye sight.     She said that she has been very stressful lately with her job and life. Her stomach is not good. She has always felt nauseated, acid reflux and constipation. She reports that she could eat only 1 meal per day at 4 pm. Her diet is usually potato, or hamburger. She could not tolerate a lot of vegetable. She has not have time to eat or exercise. She has been very tired and had difficult time losing weight.    A1C today is 7.6 %. Reviewed glucose meter, she did not check BG very often. Average glucose over the past 3 days was 140 (SD 39). No hypoglycemia noted.  She is currently on glipizide XL 10 mg, Farxiga 10 mg daily and metformin  1000 mg bid.  She is taking topiramate 100 mg daily at night for weight loss. She does not take phentermine anymore because she does not feel that it is helping.        DM complications:  Retinopathy: exam in 11/2018-  NPDR bilaterally.   Nephropathy: negative for microalb in 8/2017  Neuropathy: denied   Aspirin 81 mg daily   11/12/18    Past Medical History  Type 2 diabetes with retinopathy  Schatzki's ring s/p esophageal dilatation    Allergies  Allergies   Allergen Reactions     Ace Inhibitors Itching     Codeine      Medications  Current Outpatient Prescriptions   Medication Sig Dispense Refill     blood glucose monitoring (ACCU-CHEK FASTCLIX) lancets Use to test blood sugar 1-2 times daily or as directed. 102 each 3     blood glucose monitoring (NO BRAND SPECIFIED) test strip Use to test blood sugars 3 times daily or as directed. Accu-chek smartview test strips (has Marisela meter) 100 strip 3     dapagliflozin (FARXIGA) 10 MG TABS tablet Take one tablet by mouth daily 90 tablet 3     glipiZIDE (GLUCOTROL XL) 10 MG 24 hr tablet Take 1 tablet (10 mg) by mouth daily 90 tablet 3     linaclotide (LINZESS) 290 MCG capsule Take 1 capsule (290 mcg) by mouth every morning (before breakfast) 90 capsule 3     losartan (COZAAR) 50 MG tablet Take 1 tablet (50 mg) by mouth daily 30 tablet 5     metFORMIN (GLUCOPHAGE) 500 MG tablet 2 tablets with breakfast and 2 tablet with supper 360 tablet 3     OMEPRAZOLE PO Take 40 mg by mouth every morning        pantoprazole (PROTONIX) 40 MG EC tablet Take 1 tablet (40 mg) by mouth daily 90 tablet 3     phentermine 30 MG capsule Take 1 capsule (30 mg) by mouth every morning 30 capsule 5     topiramate (TOPAMAX) 100 MG tablet Take 1 tablet (100 mg) by mouth daily 90 tablet 3     UNABLE TO FIND MEDICATION NAME: Nanda Cardenas PM       [DISCONTINUED] topiramate (TOPAMAX) 100 MG tablet Take 1 tablet (100 mg) by mouth daily 90 tablet 2     Family History  Type 2 diabetes in father, mother  and all siblings    Social History  Social History   Substance Use Topics     Smoking status: Never Smoker     Smokeless tobacco: Never Used     Alcohol use Not on file       ROS  Constitutional: + fatigue, inability to lose weight  Eyes: +blurried vision left eye s/p injection last week  Neck: +schatzki ring  Cardiovascular: no chest pain, palpitations  Respiratory: no dyspnea, cough, shortness of breath or wheezing   GI: + nausea, vomiting, diarrhea, + constipation, no abdominal pain   : no change in urine, no dysuria  Musculoskeletal: no joint or muscle pain or swelling   Integumentary: no concerning lesions   Neuro: no loss of strength or sensation, no numbness or tingling, no tremor, no dizziness, no headache   Endo: no polyuria or polydipsia, feeling cold intolerance   Heme/Lymph: no concerning bumps, no bleeding problems   Allergy: no environmental allergies   Psych: +frustrated due to a lot of stresses     Physical Exam  BP (!) 151/94 (BP Location: Left arm, Patient Position: Sitting, Cuff Size: Adult Regular)  Pulse 80  Wt 66.1 kg (145 lb 11.2 oz)  SpO2 97%  BMI 28.46 kg/m2  Body mass index is 28.46 kg/(m^2).  Constitutional: no distress, comfortable, pleasant   Eyes: anicteric  Musculoskeletal: no edema   Skin:  no jaundice   Neurological: normal gait, intact monofilament sensation bilateral feet (6/2018)  Psychological: feels frustrated about her symptoms.    RESULTS  Lab Results   Component Value Date    A1C 7.6 11/12/2018    A1C 8.0 06/11/2018    A1C 7.6 01/22/2018    A1C 9.2 08/28/2017    A1C 8.4 01/23/2017     ENDO DIABETES Latest Ref Rng & Units 11/12/2018   CHOLESTEROL <200 mg/dL 236 (H)   LDL CHOLESTEROL, CALCULATED <100 mg/dL 140 (H)   HDL CHOLESTEROL >49 mg/dL 48 (L)   NON HDL CHOLESTEROL <130 mg/dL 188 (H)   TRIGLYCERIDES <150 mg/dL 240 (H)   ALBUMIN URINE MG/L mg/L 8   ALBUMIN URINE MG/G CR 0 - 25 mg/g Cr 8.01   CREATININE 0.52 - 1.04 mg/dL 0.74   POTASSIUM 3.4 - 5.3 mmol/L 3.8   ALT 0 -  50 U/L 23   AST 0 - 45 U/L 14     ASSESSMENT:    Lorrie Blake is a 57 years old female with hx of uncontrolled type 2 diabetes and retinopathy who is here for follow up.    1) type 2 diabetes with retinopathy: A1C today is 7.6%.   - she is under a lot of stresses, lack of time for exercise. Diet is not the best.  -I will continue glipizide XR 10 mg daily, metformin 1000 mg bid and Farxiga 10 mg daily.  - she does not want to do injectable medication due to cost.    2) DM complications:  Retinopathy: exam in 11/2018-- NPDR bilaterally, s/p injection left eye  Nephropathy: negative for microalb in 11/12/18  Neuropathy: intermittent - likely from topiramate    3) overweight: due to increased sweet craving. Still craves sweet. Irregular eating habit  -Continue topiramate 100 mg daily. No phentermine due to uncontrolled hypertension.    4) Hypertension: diastolic hypertension. Saw PCP today and started on losartan 50 mg dialy    5) hyperlipidemia: . Will start rosuvastain 5 mg. She reports having symptoms of myalgia with atorvastatin.    PLAN:   - follow up with PCP for hypertension  - start rosuvastatin 5 mg daily  - continue with glipizide XR 10 mg daily, Farxiga 10 mg and metformin 1000 mg bid  - continue topiramate 100 mg daily  - emphasize her to check blood glucose regularly.  - RTC 4 months to see me    Froy Washington MD  Endocrinology  [phone]

## 2018-11-12 NOTE — PATIENT INSTRUCTIONS
- start Crestor 5 mg daily  - continue diabetes medication  - take blood pressure medication regularly  - walk daily  - see your primary doctor for blood pressure  - see me in 4 months  - If you have any questions, please do not hesitate to call [facility] at [phone] and ask for Endocrinology clinic.    Sincerely,    Froy Washington MD  Endocrinology

## 2018-11-12 NOTE — NURSING NOTE
IRAM faxed to Vitreoretinal surgery in [place], [phone] requesting most recent diabetic eye exam.  [facility]  Adult Endocrinology  [facility]

## 2018-11-13 ENCOUNTER — TELEPHONE (OUTPATIENT)
Dept: FAMILY MEDICINE | Facility: CLINIC | Age: 58
End: 2018-11-13

## 2018-11-17 ENCOUNTER — HEALTH MAINTENANCE LETTER (OUTPATIENT)
Age: 58
End: 2018-11-17

## 2018-12-07 ENCOUNTER — TRANSFERRED RECORDS (OUTPATIENT)
Dept: HEALTH INFORMATION MANAGEMENT | Facility: CLINIC | Age: 58
End: 2018-12-07

## 2019-03-05 ENCOUNTER — TRANSFERRED RECORDS (OUTPATIENT)
Dept: HEALTH INFORMATION MANAGEMENT | Facility: CLINIC | Age: 59
End: 2019-03-05

## 2019-03-09 ENCOUNTER — NURSE TRIAGE (OUTPATIENT)
Dept: NURSING | Facility: CLINIC | Age: 59
End: 2019-03-09

## 2019-03-09 ENCOUNTER — TELEPHONE (OUTPATIENT)
Dept: FAMILY MEDICINE | Facility: CLINIC | Age: 59
End: 2019-03-09

## 2019-03-09 DIAGNOSIS — I10 HYPERTENSION GOAL BP (BLOOD PRESSURE) < 130/80: Primary | ICD-10-CM

## 2019-03-09 NOTE — TELEPHONE ENCOUNTER
Clinic Action Needed:  Yes, callback  [facility]NA Triage Call  Presenting Problem:    Lorrie is requesting to send a message to  MD Mosqueda.  Lorrie has problems with her eyes and has diabetes retinopathy and was told by Eye MD that her  blood pressure medicine is not adequate.  Lorrie was told by MD Cardona to have Lisinopril increased.  Last BP was 190/98 on Tuesday March 5th.  BP was high due to stress for injection in eye.  Please phone Lorrie on Monday March 9/11/2019 at [phone].      Routed to:  RN Pool  Please be sure to close this encounter once this patient's issue/question has been addressed.    Kristy Mix RN/[facility]

## 2019-03-09 NOTE — TELEPHONE ENCOUNTER
Lorrie is requesting to send a message to  MD Mosqueda.  Lorrie has problems with her eyes and has diabetes retinopathy and was told by Eye MD that her  blood pressure medicine is not adequate.  Lorrie was told by MD Cardona to have Lisinopril increased.  Last BP was 190/98 on Tuesday March 5th.  BP was high due to stress for injection in eye.  Please phone Lorrie on Monday March 9/11/2019 at [phone].

## 2019-03-10 RX ORDER — CARVEDILOL 6.25 MG/1
6.25 TABLET ORAL 2 TIMES DAILY WITH MEALS
Qty: 60 TABLET | Refills: 0 | Status: SHIPPED | OUTPATIENT
Start: 2019-03-10 | End: 2019-04-05

## 2019-03-10 RX ORDER — TELMISARTAN 80 MG/1
80 TABLET ORAL EVERY MORNING
Qty: 30 TABLET | Refills: 0 | Status: SHIPPED | OUTPATIENT
Start: 2019-03-10 | End: 2019-04-05 | Stop reason: DRUGHIGH

## 2019-03-10 NOTE — TELEPHONE ENCOUNTER
1) Low salt diet mandatory.  2) Stop Losartan.  3) Start Carvedilol 6.25 mg BID. Rx sent without additional refills as clinic appointment necessary for BP check with this provider in one month.  4) Start Telmisartan 80 mg once daily. Rx sent without additional refills as clinic appointment is necessary for BP check with this provider in one month.

## 2019-03-11 NOTE — TELEPHONE ENCOUNTER
Called and spoke with patient, informed of instructions below regarding blood pressure and medication changes. Read provider instructions verbatim.   Patient understanding and agreed to changes. Will stop Losartan and start taking carvedilol and telmisartan Patient instructed to follow up in about 3 weeks to recheck blood pressure, come in before runs out of pills as provider only gave 30 day supply. Wants recheck in 1 month.  Patient agreed and understanding. No further questions at this time.    Bela Seo RN

## 2019-03-18 ENCOUNTER — OFFICE VISIT (OUTPATIENT)
Dept: ENDOCRINOLOGY | Facility: CLINIC | Age: 59
End: 2019-03-18
Payer: COMMERCIAL

## 2019-03-18 ENCOUNTER — TELEPHONE (OUTPATIENT)
Dept: ENDOCRINOLOGY | Facility: CLINIC | Age: 59
End: 2019-03-18

## 2019-03-18 VITALS
HEIGHT: 60 IN | DIASTOLIC BLOOD PRESSURE: 69 MMHG | WEIGHT: 136.6 LBS | HEART RATE: 88 BPM | BODY MASS INDEX: 26.82 KG/M2 | SYSTOLIC BLOOD PRESSURE: 95 MMHG | OXYGEN SATURATION: 99 %

## 2019-03-18 DIAGNOSIS — E11.3299 TYPE 2 DIABETES MELLITUS WITH MILD NONPROLIFERATIVE RETINOPATHY WITHOUT MACULAR EDEMA, WITHOUT LONG-TERM CURRENT USE OF INSULIN, UNSPECIFIED LATERALITY (H): ICD-10-CM

## 2019-03-18 DIAGNOSIS — E66.3 OVERWEIGHT (BMI 25.0-29.9): ICD-10-CM

## 2019-03-18 DIAGNOSIS — K59.04 CHRONIC IDIOPATHIC CONSTIPATION: ICD-10-CM

## 2019-03-18 LAB — HBA1C MFR BLD: 8.5 % (ref 0–5.6)

## 2019-03-18 PROCEDURE — 36415 COLL VENOUS BLD VENIPUNCTURE: CPT | Performed by: INTERNAL MEDICINE

## 2019-03-18 PROCEDURE — 99214 OFFICE O/P EST MOD 30 MIN: CPT | Performed by: INTERNAL MEDICINE

## 2019-03-18 PROCEDURE — 83036 HEMOGLOBIN GLYCOSYLATED A1C: CPT | Performed by: INTERNAL MEDICINE

## 2019-03-18 RX ORDER — PHENTERMINE HYDROCHLORIDE 30 MG/1
1 CAPSULE ORAL DAILY
Refills: 5 | COMMUNITY
Start: 2018-11-13 | End: 2019-03-18

## 2019-03-18 RX ORDER — GLIPIZIDE 10 MG/1
10 TABLET, FILM COATED, EXTENDED RELEASE ORAL 2 TIMES DAILY
Qty: 180 TABLET | Refills: 3 | Status: SHIPPED | OUTPATIENT
Start: 2019-03-18 | End: 2019-07-22

## 2019-03-18 RX ORDER — POLYETHYLENE GLYCOL 3350 17 G/17G
17 POWDER, FOR SOLUTION ORAL DAILY
COMMUNITY
End: 2020-07-02

## 2019-03-18 RX ORDER — DAPAGLIFLOZIN 10 MG/1
TABLET, FILM COATED ORAL
Qty: 90 TABLET | Refills: 3 | Status: SHIPPED | OUTPATIENT
Start: 2019-03-18 | End: 2019-07-22

## 2019-03-18 RX ORDER — DOCUSATE SODIUM 100 MG/1
100 CAPSULE, LIQUID FILLED ORAL 2 TIMES DAILY PRN
COMMUNITY

## 2019-03-18 RX ORDER — LANOLIN ALCOHOL/MO/W.PET/CERES
3 CREAM (GRAM) TOPICAL
COMMUNITY
End: 2020-07-02 | Stop reason: DRUGHIGH

## 2019-03-18 ASSESSMENT — MIFFLIN-ST. JEOR: SCORE: 1124.86

## 2019-03-18 NOTE — PATIENT INSTRUCTIONS
- continue with glipizide XR 10 mg twice a day  - continue Farxiga 10 mg once a day  - continue metformin 1000 mg twice a day  - continue topiramate 100 mg daily  - continue rosuvastatin 5 mg daily    If you have any questions, please do not hesitate to call [facility] at [phone] and ask for Endocrinology clinic.    Sincerely,    Froy Washington MD  Endocrinology

## 2019-03-18 NOTE — PROGRESS NOTES
Endocrinology Note         Lorrie is a 58 year old female presents today for type 2 diabetes.    HPI  Lorrie Blake is a 58 years old female with hx of uncontrolled type 2 diabetes and retinopathy. Last seen 11/2018    She has had type 2 diabetes since 1997 and was initially treated with glyburide. She reported that sulfonylurea did not work for her and A1C has been persistenly >14 since 2012. She was also tried on different medications including Byetta, Victoza, Bydureon (it did not improve her glucose and it is expensive for her), insulin (it caused significant hypoglycemia and too expensive). In February 2015, c-peptide was 2.8 and negative insulin antibodies. At that time,she was then started on Farxiga and metformin. She does not want to be on GLP-1 agonist due to financial issue.      Interim history  She said that she has been very stressful lately with her job and life. Her stomach is not good. She has always felt nauseated, acid reflux and constipation. She reports that she could eat only 1 meal per day at 4 pm. Her diet is usually potato, bread and cracker. She could not tolerate a lot of vegetable. She has not have time to eat or exercise. She has been very tired and had difficult time losing weight.    A1C today is up to 8.5%. Reviewed glucose meter, she did not check BG very often. Average glucose was 180 (SD 33). No hypoglycemia noted. She is currently on glipizide XL 10 mg, Farxiga 10 mg daily and metformin 1000 mg bid.  She is taking topiramate 100 mg daily at night for weight loss. She does not take phentermine anymore because of high blood pressure. Blood pressure medication has been adjusted.    DM complications:  Retinopathy: exam 10/2018-  NPDR bilaterally.   Nephropathy: negative for microalb in 8/2017  Neuropathy: denied   Aspirin 81 mg daily   11/12/18    Past Medical History  Type 2 diabetes with retinopathy  Schatzki's ring s/p esophageal dilatation    Allergies  Allergies    Allergen Reactions     Ace Inhibitors Itching     Codeine      Lisinopril Nausea and Cough     Medications  Current Outpatient Medications   Medication Sig Dispense Refill     blood glucose monitoring (ACCU-CHEK FASTCLIX) lancets Use to test blood sugar 1-2 times daily or as directed. 102 each 3     blood glucose monitoring (NO BRAND SPECIFIED) test strip Use to test blood sugars 3 times daily or as directed. Accu-chek smartview test strips (has Marisela meter) 100 strip 3     calcium carbonate (TUMS) 500 MG chewable tablet Take 2 chew tab by mouth Patient take 4-8 tabs daily       carvedilol (COREG) 6.25 MG tablet Take 1 tablet (6.25 mg) by mouth 2 times daily (with meals) 60 tablet 0     dapagliflozin (FARXIGA) 10 MG TABS tablet Take one tablet by mouth daily 90 tablet 3     glipiZIDE (GLUCOTROL XL) 10 MG 24 hr tablet Take 1 tablet (10 mg) by mouth daily 90 tablet 3     linaclotide (LINZESS) 290 MCG capsule Take 1 capsule (290 mcg) by mouth every morning (before breakfast) 90 capsule 3     metFORMIN (GLUCOPHAGE) 500 MG tablet 2 tablets with breakfast and 2 tablet with supper 360 tablet 3     OMEPRAZOLE PO Take 40 mg by mouth every morning        pantoprazole (PROTONIX) 40 MG EC tablet Take 1 tablet (40 mg) by mouth daily 90 tablet 3     rosuvastatin (CRESTOR) 5 MG tablet Take 1 tablet (5 mg) by mouth daily 90 tablet 3     telmisartan (MICARDIS) 80 MG tablet Take 1 tablet (80 mg) by mouth every morning Take with morning dose of Carvedilol. 30 tablet 0     topiramate (TOPAMAX) 100 MG tablet Take 1 tablet (100 mg) by mouth daily 90 tablet 3     UNABLE TO FIND Take 2 each by mouth At Bedtime MEDICATION NAME: Nanda VALENZUELA        Family History  Type 2 diabetes in father, mother and all siblings    Social History  Social History     Tobacco Use     Smoking status: Never Smoker     Smokeless tobacco: Never Used   Substance Use Topics     Alcohol use: Not on file     Drug use: Not on file       ROS  Constitutional: +  "fatigue, inability to lose weight  Eyes: +blurried vision left eye s/p injection last week  Neck: +schatzki ring  Cardiovascular: no chest pain, palpitations  Respiratory: no dyspnea, cough, shortness of breath or wheezing   GI: + nausea, vomiting, diarrhea, +constipation, no abdominal pain   : no change in urine, no dysuria  Musculoskeletal: no joint or muscle pain or swelling   Integumentary: no concerning lesions   Neuro: no loss of strength or sensation, no numbness or tingling, no tremor, no dizziness, no headache   Endo: no polyuria or polydipsia, feeling cold intolerance   Heme/Lymph: no concerning bumps, no bleeding problems   Allergy: no environmental allergies   Psych: +frustrated due to a lot of stresses     Physical Exam  BP 95/69 (BP Location: Left arm, Patient Position: Sitting, Cuff Size: Adult Regular)   Pulse 88   Ht 1.53 m (5' 0.24\")   Wt 62 kg (136 lb 9.6 oz)   SpO2 99%   BMI 26.47 kg/m    Body mass index is 26.47 kg/m .  Constitutional: no distress, comfortable, pleasant   Eyes: anicteric  Musculoskeletal: no edema   Skin:  no jaundice   Neurological: normal gait, intact monofilament sensation bilateral feet (6/2018)  Psychological: feels frustrated about her symptoms.    RESULTS  Lab Results   Component Value Date    A1C 8.5 03/18/2019    A1C 7.6 11/12/2018    A1C 8.0 06/11/2018    A1C 7.6 01/22/2018    A1C 9.2 08/28/2017     ENDO DIABETES Latest Ref Rng & Units 11/12/2018   CHOLESTEROL <200 mg/dL 236 (H)   LDL CHOLESTEROL, CALCULATED <100 mg/dL 140 (H)   HDL CHOLESTEROL >49 mg/dL 48 (L)   NON HDL CHOLESTEROL <130 mg/dL 188 (H)   TRIGLYCERIDES <150 mg/dL 240 (H)   ALBUMIN URINE MG/L mg/L 8   ALBUMIN URINE MG/G CR 0 - 25 mg/g Cr 8.01   CREATININE 0.52 - 1.04 mg/dL 0.74   POTASSIUM 3.4 - 5.3 mmol/L 3.8   ALT 0 - 50 U/L 23   AST 0 - 45 U/L 14     ASSESSMENT:    Lorrie Blake is a 58 years old female with hx of uncontrolled type 2 diabetes and retinopathy who is here for follow up.    1) " type 2 diabetes with retinopathy: A1C today is 8.5%.   - she is under a lot of stresses, lack of time for exercise. Diet is not the best.  - discussed aggressive dietary modification  - I will increase glipizide XR 10 mg bid  - continue metformin 1000 mg bid and Farxiga 10 mg daily.  - she does not want to do injectable medication due to cost.    2) DM complications:  Retinopathy: exam in 11/2018-- NPDR bilaterally, s/p injection left eye  Nephropathy: negative for microalb in 11/12/18  Neuropathy: intermittent - likely from topiramate    3) overweight: due to increased sweet craving. Still craves sweet. Irregular eating habit  -Continue topiramate 100 mg daily. No phentermine due to uncontrolled hypertension.    4) Hypertension: diastolic hypertension. Now on telmisartan 80 mg, carvedilol 6.25 mg bid    5) hyperlipidemia: . continue rosuvastain 5 mg. She reports having symptoms of myalgia with atorvastatin.    PLAN:   - follow up with PCP for hypertension  - increase glipizide XR 10 mg bid, Farxiga 10 mg and metformin 1000 mg bid  - continue rosuvastatin 5 mg daily  - continue topiramate 100 mg daily  - emphasize her to check blood glucose regularly.  - RTC 4 months    Froy Washington MD  Endocrinology  [phone]

## 2019-03-18 NOTE — LETTER
3/18/2019         RE: Lorrie Blake  [address]  [place] [address]        Dear Colleague,    Thank you for referring your patient, Lorrie Blake, to the [facility]. Please see a copy of my visit note below.         Endocrinology Note         Lorrie is a 58 year old female presents today for type 2 diabetes.    HPI  Lorrie Blake is a 58 years old female with hx of uncontrolled type 2 diabetes and retinopathy. Last seen 11/2018    She has had type 2 diabetes since 1997 and was initially treated with glyburide. She reported that sulfonylurea did not work for her and A1C has been persistenly >14 since 2012. She was also tried on different medications including Byetta, Victoza, Bydureon (it did not improve her glucose and it is expensive for her), insulin (it caused significant hypoglycemia and too expensive). In February 2015, c-peptide was 2.8 and negative insulin antibodies. At that time,she was then started on Farxiga and metformin. She does not want to be on GLP-1 agonist due to financial issue.      Interim history  She said that she has been very stressful lately with her job and life. Her stomach is not good. She has always felt nauseated, acid reflux and constipation. She reports that she could eat only 1 meal per day at 4 pm. Her diet is usually potato, bread and cracker. She could not tolerate a lot of vegetable. She has not have time to eat or exercise. She has been very tired and had difficult time losing weight.    A1C today is up to 8.5%. Reviewed glucose meter, she did not check BG very often. Average glucose was 180 (SD 33). No hypoglycemia noted. She is currently on glipizide XL 10 mg, Farxiga 10 mg daily and metformin 1000 mg bid.  She is taking topiramate 100 mg daily at night for weight loss. She does not take phentermine anymore because of high blood pressure. Blood pressure medication has been adjusted.    DM complications:  Retinopathy: exam  10/2018-  NPDR bilaterally.   Nephropathy: negative for microalb in 8/2017  Neuropathy: denied   Aspirin 81 mg daily   11/12/18    Past Medical History  Type 2 diabetes with retinopathy  Schatzki's ring s/p esophageal dilatation    Allergies  Allergies   Allergen Reactions     Ace Inhibitors Itching     Codeine      Lisinopril Nausea and Cough     Medications  Current Outpatient Medications   Medication Sig Dispense Refill     blood glucose monitoring (ACCU-CHEK FASTCLIX) lancets Use to test blood sugar 1-2 times daily or as directed. 102 each 3     blood glucose monitoring (NO BRAND SPECIFIED) test strip Use to test blood sugars 3 times daily or as directed. Accu-chek smartview test strips (has Marisela meter) 100 strip 3     calcium carbonate (TUMS) 500 MG chewable tablet Take 2 chew tab by mouth Patient take 4-8 tabs daily       carvedilol (COREG) 6.25 MG tablet Take 1 tablet (6.25 mg) by mouth 2 times daily (with meals) 60 tablet 0     dapagliflozin (FARXIGA) 10 MG TABS tablet Take one tablet by mouth daily 90 tablet 3     glipiZIDE (GLUCOTROL XL) 10 MG 24 hr tablet Take 1 tablet (10 mg) by mouth daily 90 tablet 3     linaclotide (LINZESS) 290 MCG capsule Take 1 capsule (290 mcg) by mouth every morning (before breakfast) 90 capsule 3     metFORMIN (GLUCOPHAGE) 500 MG tablet 2 tablets with breakfast and 2 tablet with supper 360 tablet 3     OMEPRAZOLE PO Take 40 mg by mouth every morning        pantoprazole (PROTONIX) 40 MG EC tablet Take 1 tablet (40 mg) by mouth daily 90 tablet 3     rosuvastatin (CRESTOR) 5 MG tablet Take 1 tablet (5 mg) by mouth daily 90 tablet 3     telmisartan (MICARDIS) 80 MG tablet Take 1 tablet (80 mg) by mouth every morning Take with morning dose of Carvedilol. 30 tablet 0     topiramate (TOPAMAX) 100 MG tablet Take 1 tablet (100 mg) by mouth daily 90 tablet 3     UNABLE TO FIND Take 2 each by mouth At Bedtime MEDICATION NAME: Nanda Cardenas PM        Family History  Type 2 diabetes in  "father, mother and all siblings    Social History  Social History     Tobacco Use     Smoking status: Never Smoker     Smokeless tobacco: Never Used   Substance Use Topics     Alcohol use: Not on file     Drug use: Not on file       ROS  Constitutional: + fatigue, inability to lose weight  Eyes: +blurried vision left eye s/p injection last week  Neck: +schatzki ring  Cardiovascular: no chest pain, palpitations  Respiratory: no dyspnea, cough, shortness of breath or wheezing   GI: + nausea, vomiting, diarrhea, +constipation, no abdominal pain   : no change in urine, no dysuria  Musculoskeletal: no joint or muscle pain or swelling   Integumentary: no concerning lesions   Neuro: no loss of strength or sensation, no numbness or tingling, no tremor, no dizziness, no headache   Endo: no polyuria or polydipsia, feeling cold intolerance   Heme/Lymph: no concerning bumps, no bleeding problems   Allergy: no environmental allergies   Psych: +frustrated due to a lot of stresses     Physical Exam  BP 95/69 (BP Location: Left arm, Patient Position: Sitting, Cuff Size: Adult Regular)   Pulse 88   Ht 1.53 m (5' 0.24\")   Wt 62 kg (136 lb 9.6 oz)   SpO2 99%   BMI 26.47 kg/m     Body mass index is 26.47 kg/m .  Constitutional: no distress, comfortable, pleasant   Eyes: anicteric  Musculoskeletal: no edema   Skin:  no jaundice   Neurological: normal gait, intact monofilament sensation bilateral feet (6/2018)  Psychological: feels frustrated about her symptoms.    RESULTS  Lab Results   Component Value Date    A1C 8.5 03/18/2019    A1C 7.6 11/12/2018    A1C 8.0 06/11/2018    A1C 7.6 01/22/2018    A1C 9.2 08/28/2017     ENDO DIABETES Latest Ref Rng & Units 11/12/2018   CHOLESTEROL <200 mg/dL 236 (H)   LDL CHOLESTEROL, CALCULATED <100 mg/dL 140 (H)   HDL CHOLESTEROL >49 mg/dL 48 (L)   NON HDL CHOLESTEROL <130 mg/dL 188 (H)   TRIGLYCERIDES <150 mg/dL 240 (H)   ALBUMIN URINE MG/L mg/L 8   ALBUMIN URINE MG/G CR 0 - 25 mg/g Cr 8.01 "   CREATININE 0.52 - 1.04 mg/dL 0.74   POTASSIUM 3.4 - 5.3 mmol/L 3.8   ALT 0 - 50 U/L 23   AST 0 - 45 U/L 14     ASSESSMENT:    Lorrie Blake is a 58 years old female with hx of uncontrolled type 2 diabetes and retinopathy who is here for follow up.    1) type 2 diabetes with retinopathy: A1C today is 8.5%.   - she is under a lot of stresses, lack of time for exercise. Diet is not the best.  - discussed aggressive dietary modification  - I will increase glipizide XR 10 mg bid  - continue metformin 1000 mg bid and Farxiga 10 mg daily.  - she does not want to do injectable medication due to cost.    2) DM complications:  Retinopathy: exam in 11/2018-- NPDR bilaterally, s/p injection left eye  Nephropathy: negative for microalb in 11/12/18  Neuropathy: intermittent - likely from topiramate    3) overweight: due to increased sweet craving. Still craves sweet. Irregular eating habit  -Continue topiramate 100 mg daily. No phentermine due to uncontrolled hypertension.    4) Hypertension: diastolic hypertension. Now on telmisartan 80 mg, carvedilol 6.25 mg bid    5) hyperlipidemia: . continue rosuvastain 5 mg. She reports having symptoms of myalgia with atorvastatin.    PLAN:   - follow up with PCP for hypertension  - increase glipizide XR 10 mg bid, Farxiga 10 mg and metformin 1000 mg bid  - continue rosuvastatin 5 mg daily  - continue topiramate 100 mg daily  - emphasize her to check blood glucose regularly.  - RTC 4 months    Froy Washington MD  Endocrinology  [phone]

## 2019-04-05 ENCOUNTER — OFFICE VISIT (OUTPATIENT)
Dept: FAMILY MEDICINE | Facility: CLINIC | Age: 59
End: 2019-04-05
Payer: COMMERCIAL

## 2019-04-05 ENCOUNTER — TELEPHONE (OUTPATIENT)
Dept: FAMILY MEDICINE | Facility: CLINIC | Age: 59
End: 2019-04-05

## 2019-04-05 VITALS
BODY MASS INDEX: 27.68 KG/M2 | DIASTOLIC BLOOD PRESSURE: 86 MMHG | TEMPERATURE: 98.2 F | HEIGHT: 60 IN | HEART RATE: 94 BPM | SYSTOLIC BLOOD PRESSURE: 112 MMHG | WEIGHT: 141 LBS | RESPIRATION RATE: 14 BRPM

## 2019-04-05 DIAGNOSIS — K21.9 GASTROESOPHAGEAL REFLUX DISEASE, ESOPHAGITIS PRESENCE NOT SPECIFIED: ICD-10-CM

## 2019-04-05 DIAGNOSIS — I10 ESSENTIAL HYPERTENSION, BENIGN: Primary | ICD-10-CM

## 2019-04-05 DIAGNOSIS — E11.3299 TYPE 2 DIABETES MELLITUS WITH MILD NONPROLIFERATIVE RETINOPATHY WITHOUT MACULAR EDEMA, WITHOUT LONG-TERM CURRENT USE OF INSULIN, UNSPECIFIED LATERALITY (H): ICD-10-CM

## 2019-04-05 PROCEDURE — 99214 OFFICE O/P EST MOD 30 MIN: CPT | Performed by: FAMILY MEDICINE

## 2019-04-05 RX ORDER — CARVEDILOL 6.25 MG/1
6.25 TABLET ORAL 2 TIMES DAILY WITH MEALS
Qty: 180 TABLET | Refills: 0 | Status: SHIPPED | OUTPATIENT
Start: 2019-04-05 | End: 2019-07-15

## 2019-04-05 RX ORDER — PANTOPRAZOLE SODIUM 40 MG/1
40 TABLET, DELAYED RELEASE ORAL 2 TIMES DAILY
Qty: 180 TABLET | Refills: 1 | Status: SHIPPED | OUTPATIENT
Start: 2019-04-05 | End: 2019-09-18

## 2019-04-05 RX ORDER — TELMISARTAN 40 MG/1
40 TABLET ORAL DAILY
Qty: 90 TABLET | Refills: 1 | Status: SHIPPED | OUTPATIENT
Start: 2019-04-05 | End: 2019-07-22 | Stop reason: DRUGHIGH

## 2019-04-05 SDOH — HEALTH STABILITY: MENTAL HEALTH: HOW OFTEN DO YOU HAVE A DRINK CONTAINING ALCOHOL?: NEVER

## 2019-04-05 ASSESSMENT — MIFFLIN-ST. JEOR: SCORE: 1144.82

## 2019-04-05 ASSESSMENT — PAIN SCALES - GENERAL: PAINLEVEL: MODERATE PAIN (4)

## 2019-04-05 NOTE — PROGRESS NOTES
"  SUBJECTIVE:                                                    Lorrie Blake is a 58 year old female who presents to clinic today for the following health issues:        Diabetes Follow-up      Patient is checking blood sugars: \"not often maybe couple time of month. I do see my diabetes doctor every 6 months.     Diabetic concerns: other - \"it bad, my A1c is bad.\" pt states it due with stress      Symptoms of hypoglycemia (low blood sugar): none     Paresthesias (numbness or burning in feet) or sores: No     Date of last diabetic eye exam: \"I see the eye doctor a lot because I have diabetes retinopathy\" patient bought a copy of the eye exam result.     Diabetes Management Resources    Hyperlipidemia Follow-Up      Rate your low fat/cholesterol diet?: not monitoring fat    Taking statin?  Yes, no muscle aches from statin    Other lipid medications/supplements?:  none    Hypertension Follow-up      Outpatient blood pressures are not being checked. Patient ran out bp meds a day ago.     Low Salt Diet: no added salt    Reports feeling fatigued. No lightheadedness any longer.     BP Readings from Last 2 Encounters:   04/05/19 112/86   03/18/19 95/69     Hemoglobin A1C (%)   Date Value   03/18/2019 8.5 (A)   11/12/2018 7.6 (A)     LDL Cholesterol Calculated (mg/dL)   Date Value   11/12/2018 140 (H)   08/28/2017 127 (H)       Amount of exercise or physical activity: None    Problems taking medications regularly: No    Medication side effects: none    Diet: patient is having a lot reflux, hardly eat.       GERD/Heartburn  Onset: few months, poor sleep      Description:     Burning in chest: YES    Intensity: getting bad     Progression of Symptoms: worsening    Accompanying Signs & Symptoms:  Does it feel like food gets stuck: YES- due of \"Schatzki's ring\" had the producure done twice   Nausea: no   Vomiting (bloody?): no   Abdominal Pain: YES- but not now   Black-Tarry stools: no :  Bloody stools: no     History: " "  Previous ulcers: no     Precipitating factors:   Caffeine use: YES- avoid   Alcohol use: YES- avoid   NSAID/Aspirin use: no   Tobacco use: no   Worse with fatty foods, spicy foods, caffeinated drinks and alcohol.    Alleviating factors:  None     Reports that she is very fatigued due to her sleep being interrupted by heartburn  She is needing to sleep sitting up so that she doesn't wake up with \"acid in the nose\"  Last endoscopy about 4 years ago.  \"I can't afford another one\".   Reports history of ulcer in the past and hiatal hernia.   Reports not eating much because \"I don't like food\".       Therapies Tried and outcome:omeprazole- no helping, marin chaparro         Problem list and histories reviewed & adjusted, as indicated.  Additional history: as documented    Patient Active Problem List   Diagnosis     Type 2 diabetes mellitus (H)     Overweight (BMI 25.0-29.9)     Fatigue, unspecified type     Type 2 diabetes mellitus with mild nonproliferative retinopathy without macular edema, without long-term current use of insulin, unspecified laterality (H)     History reviewed. No pertinent surgical history.    Social History     Tobacco Use     Smoking status: Never Smoker     Smokeless tobacco: Never Used   Substance Use Topics     Alcohol use: No     Frequency: Never     History reviewed. No pertinent family history.      BP Readings from Last 3 Encounters:   04/05/19 112/86   03/18/19 95/69   11/12/18 (!) 151/94    Wt Readings from Last 3 Encounters:   04/05/19 64 kg (141 lb)   03/18/19 62 kg (136 lb 9.6 oz)   11/12/18 66.1 kg (145 lb 11.2 oz)                    ROS:  CONSTITUTIONAL: NEGATIVE for fever, chills, change in weight  INTEGUMENTARY/SKIN: NEGATIVE for worrisome rashes, moles or lesions  EYES: NEGATIVE for vision changes or irritation  ENT/MOUTH: NEGATIVE for ear, mouth and throat problems  RESP: NEGATIVE for significant cough or SOB  CV: NEGATIVE for chest pain, palpitations or peripheral edema  GI: as " "above.   : NEGATIVE for frequency, dysuria, or hematuria  MUSCULOSKELETAL: NEGATIVE for significant arthralgias or myalgia  NEURO: NEGATIVE for weakness, dizziness or paresthesias  PSYCHIATRIC: NEGATIVE for changes in mood or affect    OBJECTIVE:     /86   Pulse 94   Temp 98.2  F (36.8  C) (Oral)   Resp 14   Ht 1.53 m (5' 0.24\")   Wt 64 kg (141 lb)   BMI 27.32 kg/m    Body mass index is 27.32 kg/m .  GENERAL: healthy, alert and no distress  HENT: throat clear, Mucous membranes are moist.   NECK: no adenopathy, no asymmetry, masses, or scars and thyroid normal to palpation  RESP: lungs clear to auscultation - no rales, rhonchi or wheezes  CV: regular rate and rhythm, normal S1 S2, no S3 or S4, no murmur, click or rub, no peripheral edema and peripheral pulses strong  ABDOMEN: normal bowel sounds, soft, mild generalized tenderness, no rebound or guarding. No masses.   MS: no gross musculoskeletal defects noted, no edema    Diagnostic Test Results:  none     ASSESSMENT/PLAN:         1. Essential hypertension, benign  Patient's blood pressures reviewed.   Controlled at this time.   She is noting fatigue no further lightheadedness or dizziness  telmisartan can have GERD as a side effect of the medication  Since so bothersome, will cull the telmisartan to 40 mg daily.   Recheck in 3 weeks.   Continue coreg.   All questions invited, asked and answered to the patient's apparent satisfaction.  Patient agrees to plan.    - telmisartan (MICARDIS) 40 MG tablet; Take 1 tablet (40 mg) by mouth daily  Dispense: 90 tablet; Refill: 1   -carvedilol      (COREG) 6.25 MG tablet            (K21.9) Gastroesophageal reflux disease, esophagitis presence not specified  Comment: patient with significant reflux and history in the past. She has history of Schatzki's ring.   She has no signs of bleeding.   Reduce telmisartan to 40 mg daily.   Increase protonix to 40 mg bid  Recheck in 3 weeks. Sooner if needed.   Discussed   Plan: " pantoprazole (PROTONIX) 40 MG EC tablet            (E11.3299) Type 2 diabetes mellitus with mild nonproliferative retinopathy without macular edema, without long-term current use of insulin, unspecified laterality (H)  Comment: followed by endocrinology. No updates today.     Wanted to draw labs and discuss further health maintenance but patient reports she needs to leave for court and declines any other workup today.           HANNAH ZENDEJAS MD, MD  [facility]

## 2019-04-05 NOTE — TELEPHONE ENCOUNTER
Left message for patient to return call. Dr. Angela willing to see patient at 1:40 today. Please schedule if patient can come in.

## 2019-04-05 NOTE — TELEPHONE ENCOUNTER
Reason for Call:  Same Day Appointment, Requested Provider:  any provider    PCP: Wiliam Mosqueda    Reason for visit: recheck bp she is out of meds    Duration of symptoms: n/a    Have you been treated for this in the past? Yes    Additional comments: would like to get worked in today.  Please call    Can we leave a detailed message on this number? YES    Phone number patient can be reached at: Home number on file [phone] (home)    Best Time: any    Call taken on 4/5/2019 at 10:03 AM by Tita Avery

## 2019-04-08 ENCOUNTER — TELEPHONE (OUTPATIENT)
Dept: FAMILY MEDICINE | Facility: CLINIC | Age: 59
End: 2019-04-08

## 2019-04-08 NOTE — TELEPHONE ENCOUNTER
Provider, do you want to change the prescription sig from once daily to twice daily, per note below?

## 2019-04-08 NOTE — TELEPHONE ENCOUNTER
Reason for Call:  Other prescription    Detailed comments: Patient called stating that Dr Angela needs to call [facility] in [facility] to give prior authorization to change the acid reflux medication from 1 time a day to 2 times a day. Please advise.    Phone Number Patient can be reached at: Home number on file [phone] (home)    Best Time: Any    Can we leave a detailed message on this number? YES    Call taken on 4/8/2019 at 11:53 AM by Pascual Damon

## 2019-04-08 NOTE — TELEPHONE ENCOUNTER
Routing to PA pool to start PA      Prior Authorization Retail Medication Request  Medication/Dose: pantoprazole (PROTONIX) 40 MG EC tablet - twice daily    Insurance ID (if provided):  [id number]  Insurance Phone (if provided):  [phone]

## 2019-04-08 NOTE — TELEPHONE ENCOUNTER
Yes. Please proceed with PA. Patient's reflux is not controlled with protonix 40 mg once daily. She has history of eosinophilic esophagitis and Schatzki's ring in esophagus. Plan for protonix 40 mg twice daily for 8 weeks and then return to once daily.

## 2019-04-12 NOTE — TELEPHONE ENCOUNTER
PA Initiation    Medication: pantoprazole (PROTONIX) 40 MG EC tablet - INITIATED  Insurance Company: [employer] [place] - Phone [phone] Fax [phone]  Pharmacy Filling the Rx: [id number] - [place], [place] - [address]  Filling Pharmacy Phone: [phone]  Filling Pharmacy Fax:    Start Date: 4/12/2019

## 2019-04-17 ENCOUNTER — TELEPHONE (OUTPATIENT)
Dept: FAMILY MEDICINE | Facility: CLINIC | Age: 59
End: 2019-04-17

## 2019-04-17 DIAGNOSIS — E66.3 OVERWEIGHT (BMI 25.0-29.9): Primary | ICD-10-CM

## 2019-04-17 RX ORDER — PHENTERMINE HYDROCHLORIDE 30 MG/1
CAPSULE ORAL
Qty: 30 CAPSULE | Refills: 0 | OUTPATIENT
Start: 2019-04-17

## 2019-04-17 NOTE — TELEPHONE ENCOUNTER
Routing refill request to provider for review/approval because:  Drug not on the [facility] refill protocol   Drug not active on patient's medication list        Maldonado Sarah RN, BSN

## 2019-04-17 NOTE — TELEPHONE ENCOUNTER
Forward to Dr. Angela to address since Dr. Mosqueda is not available until June.  Roxanne Ryan MD

## 2019-04-17 NOTE — TELEPHONE ENCOUNTER
Requested Prescriptions   Pending Prescriptions Disp Refills     phentermine (ADIPEX-P) 30 MG capsule [Pharmacy Med Name: PHENTERMINE HCL 30MG CAPSULES]        Last Written Prescription Date:  11/12/18  Last Fill Quantity: 30,  # refills: 5   Last Office Visit with [facility], [facility] or [facility] prescribing provider:  4/5/19   Future Office Visit:      30 capsule 0     Sig: TAKE ONE CAPSULE BY MOUTH EVERY MORNING       There is no refill protocol information for this order              Andrew Faarax  [facility]

## 2019-04-17 NOTE — TELEPHONE ENCOUNTER
Prior Authorization Approval    Authorization Effective Date: 4/12/2019  Authorization Expiration Date: 4/12/2020  Medication: pantoprazole (PROTONIX) 40 MG EC tablet - APPROVED  Approved Dose/Quantity: 180 FOR 90  Reference #:     Insurance Company: [employer] [place] - Phone [phone] Fax [phone]  Expected CoPay:       CoPay Card Available:      Foundation Assistance Needed:    Which Pharmacy is filling the prescription (Not needed for infusion/clinic administered): [id number] - [place], [place] - [address]  Pharmacy Notified: Yes  Patient Notified: Yes

## 2019-04-18 NOTE — TELEPHONE ENCOUNTER
Informed patient of below message.  She would like to speak with dr siddiqui regarding this.  Thank you.

## 2019-04-18 NOTE — TELEPHONE ENCOUNTER
Left message asking patient to return call.    Please inform her of note from provider below re: Request for refill: phentermine 30 MG capsule

## 2019-04-19 RX ORDER — PHENTERMINE HYDROCHLORIDE 30 MG/1
30 CAPSULE ORAL EVERY MORNING
Qty: 30 CAPSULE | Refills: 1 | Status: SHIPPED | OUTPATIENT
Start: 2019-04-19 | End: 2019-07-22 | Stop reason: SINTOL

## 2019-04-19 NOTE — TELEPHONE ENCOUNTER
"Discussed with patient. Phentermine is not a long term medication. Will do for 2 more months and then can consider seeing weight management or dietician.     Discussed the importance of good nutrition, eating regularly. She reports \"stress eating\" and we discussed strategies.     Will fax prescription.     Recheck blood pressure in 2 weeks.   "

## 2019-04-22 ENCOUNTER — TELEPHONE (OUTPATIENT)
Dept: FAMILY MEDICINE | Facility: CLINIC | Age: 59
End: 2019-04-22

## 2019-04-22 NOTE — TELEPHONE ENCOUNTER
Panel Management Review   One phone call and send letter if unable to reach them or [employer] message and send letter if not read after 2 weeks (You will get a message to your inbasket)      BP Readings from Last 1 Encounters:   04/05/19 112/86    ,   Lab Results   Component Value Date    A1C 8.5 03/18/2019    A1C 7.6 11/12/2018   , 11/13/2018    Fail List measure: Breast, cervical, colon cancer screening        Patient is due/failing the following:   COLONOSCOPY, MAMMOGRAM and PAP    Action needed:   Patient needs office visit for pap smear. and schedule mammogram and colonoscopy    Type of outreach:    Phone, left message for patient to call back.  and Sent letter.    Questions for provider review:    None                                                                                                                                    Jai Orozco      Chart routed to NA .

## 2019-04-22 NOTE — LETTER
[address] [address]  [phone]  Dept: [phone]      April 22, 2019      Lorrie Blake  [address] [place] [address]      Dear Lorrie Blake,     At [facility] we care about your health and are committed to providing quality patient care. Which includes staying current on preventive cancer screenings.  You can increase your chances of finding and treating cancers through regular screenings.      Our records indicate you may be due for the following preventive screening(s):    Colonoscopy  Colonoscopy is recommended every ten years for everyone age 50 and older. Please take a moment to read over the enclosed information packet about colon cancer screening. We strongly urge our patient's to consider having a colonoscopy done, which is the best screening test available and only needs to be done every 10 years if normal. If you are unwilling or unable to have a colonoscopy then we recommend the annual stool testing for blood. This test is called a FIT test and it looks for blood in the stool.     Mammogram  Mammogram for breast cancer   Recommended every 1-2 years for women age 50 and older  Mammograms help detect breast cancer, which is the most common cancer among women in the [place].  You may need to start having mammograms earlier and more often if you have had breast cancer, breast problems, or a family history of breast cancer.     Cervical Cancer Screening    Pap smear is a screening test used to detect cervical cancer. It is recommended every three years for women 21 and older. The test should be done at least once every three years but women who are at greater risk for cervical cancer may need to have the test more often.    To schedule an appointment or discuss this screening further, you may contact us by phone at the [facility] at [phone] or online through the patient  [email or web address] @ [email or web address]    If you have had any of the screenings listed above at another facility, please call us so that we may update your chart.      Your partners in health,        Quality Committee at [facility]

## 2019-07-12 DIAGNOSIS — I10 ESSENTIAL HYPERTENSION, BENIGN: ICD-10-CM

## 2019-07-12 NOTE — TELEPHONE ENCOUNTER
Coreg  Routing refill request to provider for review/approval because:  Labs not current:  BP - was due to come in for BP recheck.    Next 5 appointments (look out 90 days)    Jul 22, 2019 11:45 AM CDT  Return Visit with Froy Washington MD, [facility] ENDO NURSE  [facility] ([facility] [address] [place] [address]  [phone]        Christina Adame, RN, BSN

## 2019-07-15 RX ORDER — CARVEDILOL 6.25 MG/1
6.25 TABLET ORAL 2 TIMES DAILY WITH MEALS
Qty: 180 TABLET | Refills: 0 | Status: SHIPPED | OUTPATIENT
Start: 2019-07-15 | End: 2019-09-18

## 2019-07-22 ENCOUNTER — OFFICE VISIT (OUTPATIENT)
Dept: ENDOCRINOLOGY | Facility: CLINIC | Age: 59
End: 2019-07-22
Payer: COMMERCIAL

## 2019-07-22 VITALS
DIASTOLIC BLOOD PRESSURE: 68 MMHG | BODY MASS INDEX: 27.77 KG/M2 | OXYGEN SATURATION: 99 % | WEIGHT: 143.3 LBS | SYSTOLIC BLOOD PRESSURE: 99 MMHG | HEART RATE: 73 BPM

## 2019-07-22 DIAGNOSIS — E66.3 OVERWEIGHT (BMI 25.0-29.9): ICD-10-CM

## 2019-07-22 DIAGNOSIS — E11.3299 TYPE 2 DIABETES MELLITUS WITH MILD NONPROLIFERATIVE RETINOPATHY WITHOUT MACULAR EDEMA, WITHOUT LONG-TERM CURRENT USE OF INSULIN, UNSPECIFIED LATERALITY (H): Primary | ICD-10-CM

## 2019-07-22 LAB — HBA1C MFR BLD: 8.4 % (ref 0–5.6)

## 2019-07-22 PROCEDURE — 99214 OFFICE O/P EST MOD 30 MIN: CPT | Performed by: INTERNAL MEDICINE

## 2019-07-22 PROCEDURE — 36415 COLL VENOUS BLD VENIPUNCTURE: CPT | Performed by: INTERNAL MEDICINE

## 2019-07-22 PROCEDURE — 83036 HEMOGLOBIN GLYCOSYLATED A1C: CPT | Performed by: INTERNAL MEDICINE

## 2019-07-22 RX ORDER — TELMISARTAN 80 MG/1
1 TABLET ORAL DAILY
Refills: 0 | COMMUNITY
Start: 2019-03-11 | End: 2019-09-18

## 2019-07-22 RX ORDER — DAPAGLIFLOZIN 10 MG/1
TABLET, FILM COATED ORAL
Qty: 90 TABLET | Refills: 3 | Status: SHIPPED | OUTPATIENT
Start: 2019-07-22 | End: 2020-07-02

## 2019-07-22 RX ORDER — GLIPIZIDE 10 MG/1
10 TABLET, FILM COATED, EXTENDED RELEASE ORAL 2 TIMES DAILY
Qty: 180 TABLET | Refills: 3 | Status: SHIPPED | OUTPATIENT
Start: 2019-07-22 | End: 2020-07-02

## 2019-07-22 RX ORDER — TOPIRAMATE 100 MG/1
100 TABLET, FILM COATED ORAL DAILY
Qty: 90 TABLET | Refills: 3 | Status: SHIPPED | OUTPATIENT
Start: 2019-07-22 | End: 2020-07-02

## 2019-07-22 NOTE — PATIENT INSTRUCTIONS
- please take glipizide XR 10 mg bid, Farxiga 10 mg and metformin 1000 mg bid  - continue rosuvastatin 5 mg daily  - continue topiramate 100 mg daily  - emphasize her to check blood glucose regularly.  - please set up appointment with primary doctor when you move to [place]    If you have any questions, please do not hesitate to call [facility] at [phone] and ask for Endocrinology clinic.    Sincerely,    Froy Washington MD  Endocrinology

## 2019-07-22 NOTE — NURSING NOTE
Lorrie Blake's goals for this visit include:   Chief Complaint   Patient presents with     Diabetes     She requests these members of her care team be copied on today's visit information: Yes    PCP: Wiliam Mosqueda    Referring Provider:  Wiliam Mosqueda MD  [id number]  [place] [address]    BP 99/68 (BP Location: Left arm, Patient Position: Sitting, Cuff Size: Adult Regular)   Pulse 73   Wt 65 kg (143 lb 4.8 oz)   SpO2 99%   BMI 27.77 kg/m      Do you need any medication refills at today's visit? No

## 2019-07-22 NOTE — LETTER
7/22/2019         RE: Lorrie Blake  [address]  [place] [address]        Dear Colleague,    Thank you for referring your patient, Lorrie Blake, to the [facility], [place]. Please see a copy of my visit note below.         Endocrinology Note         Lorrie is a 58 year old female presents today for type 2 diabetes.    HPI  Lorrie Blake is a 58 years old female with hx of uncontrolled type 2 diabetes and retinopathy. Last seen 3/2019    She has had type 2 diabetes since 1997 and was initially treated with glyburide. She reported that sulfonylurea did not work for her and A1C has been persistenly >14 since 2012. She was also tried on different medications including Byetta, Victoza, Bydureon (it did not improve her glucose and it is expensive for her), insulin (it caused significant hypoglycemia and too expensive). In February 2015, c-peptide was 2.8 and negative insulin antibodies. At that time,she was then started on Farxiga and metformin. She does not want to be on GLP-1 agonist due to financial issue.      Interim history  She said that she has been very stressful lately because of divorce with her  and planning to move to [place] in September. She reports that she could not eat much due to difficulty swallowing, upset stomach and constipation. She reports that she could eat only 1 meal per day. Her diet is usually potato, bread and cracker. She could not tolerate a lot of vegetable. She has not have time to eat or exercise. She has been very tired and had difficult time losing weight.    A1C today is 8.4%. Reviewed glucose meter, she did not check BG very often at all, only a couple of numbers over the past 3 months. Glucoses numbers ranged 150-200s. No hypoglycemia noted. She is currently on glipizide XL 10 mg bid, Farxiga 10 mg daily and metformin 1000 mg bid.  She is taking topiramate 100 mg daily at night for weight loss. She does not take phentermine anymore  because of high blood pressure. Blood pressure medication has been adjusted by PCP.    DM complications:  Retinopathy: exam 10/2018-  NPDR bilaterally.   Nephropathy: negative for microalb in 11/2018  Neuropathy: denied   Aspirin 81 mg daily   11/12/18    Past Medical History  Type 2 diabetes with retinopathy  Schatzki's ring s/p esophageal dilatation    Allergies  Allergies   Allergen Reactions     Ace Inhibitors Itching     Codeine Hives     Lisinopril Nausea and Cough     Medications  Current Outpatient Medications   Medication Sig Dispense Refill     Aspirin-Diphenhydramine (GERA-SELTZER PM PO) Take 2 tablets by mouth At Bedtime       blood glucose (NO BRAND SPECIFIED) test strip Use to test blood sugars 3 times daily or as directed. Accu-chek smartview test strips (has Marisela meter) 100 strip 3     blood glucose monitoring (ACCU-CHEK FASTCLIX) lancets Use to test blood sugar 1-2 times daily or as directed. 102 each 3     calcium carbonate (TUMS) 500 MG chewable tablet Take 2 chew tab by mouth Patient take 4-8 tabs daily       carvedilol (COREG) 6.25 MG tablet Take 1 tablet (6.25 mg) by mouth 2 times daily (with meals) 180 tablet 0     dapagliflozin (FARXIGA) 10 MG TABS tablet Take one tablet by mouth daily 90 tablet 3     diphenhydrAMINE-acetaminophen (TYLENOL PM)  MG tablet Take 1 tablet by mouth nightly as needed for sleep       docusate sodium (COLACE) 100 MG capsule Take 100 mg by mouth 2 times daily as needed for constipation       glipiZIDE (GLUCOTROL XL) 10 MG 24 hr tablet Take 1 tablet (10 mg) by mouth 2 times daily 180 tablet 3     linaclotide (LINZESS) 290 MCG capsule Take 1 capsule (290 mcg) by mouth every morning (before breakfast) 90 capsule 3     melatonin 3 MG tablet Take 3 mg by mouth nightly as needed for sleep       metFORMIN (GLUCOPHAGE) 500 MG tablet 2 tablets with breakfast and 2 tablet with supper 360 tablet 3     pantoprazole (PROTONIX) 40 MG EC tablet Take 1 tablet (40 mg) by  mouth 2 times daily 180 tablet 1     phentermine (ADIPEX-P) 30 MG capsule Take 1 capsule (30 mg) by mouth every morning 30 capsule 1     polyethylene glycol (MIRALAX/GLYCOLAX) packet Take 17 g by mouth daily       rosuvastatin (CRESTOR) 5 MG tablet Take 1 tablet (5 mg) by mouth daily 90 tablet 3     telmisartan (MICARDIS) 40 MG tablet Take 1 tablet (40 mg) by mouth daily 90 tablet 1     topiramate (TOPAMAX) 100 MG tablet Take 1 tablet (100 mg) by mouth daily 90 tablet 3     UNABLE TO FIND Take 2 each by mouth At Bedtime MEDICATION NAME: Nanda VALENZUELA        Family History  Type 2 diabetes in father, mother and all siblings    Social History  Social History     Tobacco Use     Smoking status: Never Smoker     Smokeless tobacco: Never Used   Substance Use Topics     Alcohol use: No     Frequency: Never     Drug use: No       ROS  Constitutional: + fatigue, inability to lose weight  Eyes: +blurried vision left eye   Neck: +schatzki ring, difficulty swallowing  Cardiovascular: no chest pain, palpitations  Respiratory: no dyspnea, cough, shortness of breath or wheezing   GI: + nausea, vomiting, diarrhea, +constipation, no abdominal pain   : no change in urine, no dysuria  Musculoskeletal: no joint or muscle pain or swelling   Integumentary: no concerning lesions   Neuro: no loss of strength or sensation, no numbness or tingling, no tremor, no dizziness, no headache   Endo: no polyuria or polydipsia, feeling cold intolerance   Heme/Lymph: no concerning bumps, no bleeding problems   Allergy: no environmental allergies   Psych: +frustrated due to a lot of stresses     Physical Exam  BP 99/68 (BP Location: Left arm, Patient Position: Sitting, Cuff Size: Adult Regular)   Pulse 73   Wt 65 kg (143 lb 4.8 oz)   SpO2 99%   BMI 27.77 kg/m     Body mass index is 27.77 kg/m .  Constitutional: no distress, comfortable, pleasant   Eyes: anicteric  Musculoskeletal: no edema   Skin:  no jaundice   Neurological: normal gait,  intact monofilament sensation bilateral feet (6/2018)  Psychological: feels frustrated about her symptoms.    RESULTS  Lab Results   Component Value Date    A1C 8.4 07/22/2019    A1C 8.5 03/18/2019    A1C 7.6 11/12/2018    A1C 8.0 06/11/2018    A1C 7.6 01/22/2018       ENDO DIABETES Latest Ref Rng & Units 11/12/2018   CHOLESTEROL <200 mg/dL 236 (H)   LDL CHOLESTEROL, CALCULATED <100 mg/dL 140 (H)   HDL CHOLESTEROL >49 mg/dL 48 (L)   NON HDL CHOLESTEROL <130 mg/dL 188 (H)   TRIGLYCERIDES <150 mg/dL 240 (H)   ALBUMIN URINE MG/L mg/L 8   ALBUMIN URINE MG/G CR 0 - 25 mg/g Cr 8.01   CREATININE 0.52 - 1.04 mg/dL 0.74   POTASSIUM 3.4 - 5.3 mmol/L 3.8   ALT 0 - 50 U/L 23   AST 0 - 45 U/L 14     ASSESSMENT:    Lorrie Blake is a 58 years old female with hx of uncontrolled type 2 diabetes and retinopathy who is here for follow up.    1) type 2 diabetes with retinopathy: A1C today is 8.4%.   - she is under a lot of stresses, lack of time for exercise. Diet is not the best.  - discussed aggressive dietary modification  - I will continue glipizide XR 10 mg bid, metformin 1000 mg bid and Farxiga 10 mg daily.  - she will move to [place] in September. I will refill medication for her.  - she does not want to do injectable medication due to cost.    2) DM complications:  Retinopathy: exam in 11/2018-- NPDR bilaterally, s/p injection left eye  Nephropathy: negative for microalb in 11/12/18  Neuropathy: intermittent - likely from topiramate    3) overweight: due to increased sweet craving. Still craves sweet. Irregular eating habit  -Continue topiramate 100 mg daily. No phentermine due to uncontrolled hypertension.    4) Hypertension: diastolic hypertension. continue telmisartan 80 mg, carvedilol 6.25 mg bid    5) hyperlipidemia: . continue rosuvastain 5 mg. She reports having symptoms of myalgia with atorvastatin.    PLAN:   - recommend to establish care with PCP in [place] once she settles  - continue glipizide XR 10 mg  bid, Farxiga 10 mg and metformin 1000 mg bid  - continue rosuvastatin 5 mg daily  - continue topiramate 100 mg daily  - emphasize her to check blood glucose regularly.  - RTC 4 months    Froy Washington MD  Endocrinology  [phone]

## 2019-07-22 NOTE — PROGRESS NOTES
Endocrinology Note         Lorrie is a 58 year old female presents today for type 2 diabetes.    HPI  Lorrie Blake is a 58 years old female with hx of uncontrolled type 2 diabetes and retinopathy. Last seen 3/2019    She has had type 2 diabetes since 1997 and was initially treated with glyburide. She reported that sulfonylurea did not work for her and A1C has been persistenly >14 since 2012. She was also tried on different medications including Byetta, Victoza, Bydureon (it did not improve her glucose and it is expensive for her), insulin (it caused significant hypoglycemia and too expensive). In February 2015, c-peptide was 2.8 and negative insulin antibodies. At that time,she was then started on Farxiga and metformin. She does not want to be on GLP-1 agonist due to financial issue.      Interim history  She said that she has been very stressful lately because of divorce with her  and planning to move to [place] in September. She reports that she could not eat much due to difficulty swallowing, upset stomach and constipation. She reports that she could eat only 1 meal per day. Her diet is usually potato, bread and cracker. She could not tolerate a lot of vegetable. She has not have time to eat or exercise. She has been very tired and had difficult time losing weight.    A1C today is 8.4%. Reviewed glucose meter, she did not check BG very often at all, only a couple of numbers over the past 3 months. Glucoses numbers ranged 150-200s. No hypoglycemia noted. She is currently on glipizide XL 10 mg bid, Farxiga 10 mg daily and metformin 1000 mg bid.  She is taking topiramate 100 mg daily at night for weight loss. She does not take phentermine anymore because of high blood pressure. Blood pressure medication has been adjusted by PCP.    DM complications:  Retinopathy: exam 10/2018-  NPDR bilaterally.   Nephropathy: negative for microalb in 11/2018  Neuropathy: denied   Aspirin 81 mg daily    11/12/18    Past Medical History  Type 2 diabetes with retinopathy  Schatzki's ring s/p esophageal dilatation    Allergies  Allergies   Allergen Reactions     Ace Inhibitors Itching     Codeine Hives     Lisinopril Nausea and Cough     Medications  Current Outpatient Medications   Medication Sig Dispense Refill     Aspirin-Diphenhydramine (GERA-SELTZER PM PO) Take 2 tablets by mouth At Bedtime       blood glucose (NO BRAND SPECIFIED) test strip Use to test blood sugars 3 times daily or as directed. Accu-chek smartview test strips (has Marisela meter) 100 strip 3     blood glucose monitoring (ACCU-CHEK FASTCLIX) lancets Use to test blood sugar 1-2 times daily or as directed. 102 each 3     calcium carbonate (TUMS) 500 MG chewable tablet Take 2 chew tab by mouth Patient take 4-8 tabs daily       carvedilol (COREG) 6.25 MG tablet Take 1 tablet (6.25 mg) by mouth 2 times daily (with meals) 180 tablet 0     dapagliflozin (FARXIGA) 10 MG TABS tablet Take one tablet by mouth daily 90 tablet 3     diphenhydrAMINE-acetaminophen (TYLENOL PM)  MG tablet Take 1 tablet by mouth nightly as needed for sleep       docusate sodium (COLACE) 100 MG capsule Take 100 mg by mouth 2 times daily as needed for constipation       glipiZIDE (GLUCOTROL XL) 10 MG 24 hr tablet Take 1 tablet (10 mg) by mouth 2 times daily 180 tablet 3     linaclotide (LINZESS) 290 MCG capsule Take 1 capsule (290 mcg) by mouth every morning (before breakfast) 90 capsule 3     melatonin 3 MG tablet Take 3 mg by mouth nightly as needed for sleep       metFORMIN (GLUCOPHAGE) 500 MG tablet 2 tablets with breakfast and 2 tablet with supper 360 tablet 3     pantoprazole (PROTONIX) 40 MG EC tablet Take 1 tablet (40 mg) by mouth 2 times daily 180 tablet 1     phentermine (ADIPEX-P) 30 MG capsule Take 1 capsule (30 mg) by mouth every morning 30 capsule 1     polyethylene glycol (MIRALAX/GLYCOLAX) packet Take 17 g by mouth daily       rosuvastatin (CRESTOR) 5 MG tablet  Take 1 tablet (5 mg) by mouth daily 90 tablet 3     telmisartan (MICARDIS) 40 MG tablet Take 1 tablet (40 mg) by mouth daily 90 tablet 1     topiramate (TOPAMAX) 100 MG tablet Take 1 tablet (100 mg) by mouth daily 90 tablet 3     UNABLE TO FIND Take 2 each by mouth At Bedtime MEDICATION NAME: Nanda VALENZUELA        Family History  Type 2 diabetes in father, mother and all siblings    Social History  Social History     Tobacco Use     Smoking status: Never Smoker     Smokeless tobacco: Never Used   Substance Use Topics     Alcohol use: No     Frequency: Never     Drug use: No       ROS  Constitutional: + fatigue, inability to lose weight  Eyes: +blurried vision left eye   Neck: +schatzki ring, difficulty swallowing  Cardiovascular: no chest pain, palpitations  Respiratory: no dyspnea, cough, shortness of breath or wheezing   GI: + nausea, vomiting, diarrhea, +constipation, no abdominal pain   : no change in urine, no dysuria  Musculoskeletal: no joint or muscle pain or swelling   Integumentary: no concerning lesions   Neuro: no loss of strength or sensation, no numbness or tingling, no tremor, no dizziness, no headache   Endo: no polyuria or polydipsia, feeling cold intolerance   Heme/Lymph: no concerning bumps, no bleeding problems   Allergy: no environmental allergies   Psych: +frustrated due to a lot of stresses     Physical Exam  BP 99/68 (BP Location: Left arm, Patient Position: Sitting, Cuff Size: Adult Regular)   Pulse 73   Wt 65 kg (143 lb 4.8 oz)   SpO2 99%   BMI 27.77 kg/m    Body mass index is 27.77 kg/m .  Constitutional: no distress, comfortable, pleasant   Eyes: anicteric  Musculoskeletal: no edema   Skin:  no jaundice   Neurological: normal gait, intact monofilament sensation bilateral feet (6/2018)  Psychological: feels frustrated about her symptoms.    RESULTS  Lab Results   Component Value Date    A1C 8.4 07/22/2019    A1C 8.5 03/18/2019    A1C 7.6 11/12/2018    A1C 8.0 06/11/2018    A1C 7.6  01/22/2018       ENDO DIABETES Latest Ref Rng & Units 11/12/2018   CHOLESTEROL <200 mg/dL 236 (H)   LDL CHOLESTEROL, CALCULATED <100 mg/dL 140 (H)   HDL CHOLESTEROL >49 mg/dL 48 (L)   NON HDL CHOLESTEROL <130 mg/dL 188 (H)   TRIGLYCERIDES <150 mg/dL 240 (H)   ALBUMIN URINE MG/L mg/L 8   ALBUMIN URINE MG/G CR 0 - 25 mg/g Cr 8.01   CREATININE 0.52 - 1.04 mg/dL 0.74   POTASSIUM 3.4 - 5.3 mmol/L 3.8   ALT 0 - 50 U/L 23   AST 0 - 45 U/L 14     ASSESSMENT:    Lorrie Blake is a 58 years old female with hx of uncontrolled type 2 diabetes and retinopathy who is here for follow up.    1) type 2 diabetes with retinopathy: A1C today is 8.4%.   - she is under a lot of stresses, lack of time for exercise. Diet is not the best.  - discussed aggressive dietary modification  - I will continue glipizide XR 10 mg bid, metformin 1000 mg bid and Farxiga 10 mg daily.  - she will move to [place] in September. I will refill medication for her.  - she does not want to do injectable medication due to cost.    2) DM complications:  Retinopathy: exam in 11/2018-- NPDR bilaterally, s/p injection left eye  Nephropathy: negative for microalb in 11/12/18  Neuropathy: intermittent - likely from topiramate    3) overweight: due to increased sweet craving. Still craves sweet. Irregular eating habit  -Continue topiramate 100 mg daily. No phentermine due to uncontrolled hypertension.    4) Hypertension: diastolic hypertension. continue telmisartan 80 mg, carvedilol 6.25 mg bid    5) hyperlipidemia: . continue rosuvastain 5 mg. She reports having symptoms of myalgia with atorvastatin.    PLAN:   - recommend to establish care with PCP in [place] once she settles  - continue glipizide XR 10 mg bid, Farxiga 10 mg and metformin 1000 mg bid  - continue rosuvastatin 5 mg daily  - continue topiramate 100 mg daily  - emphasize her to check blood glucose regularly.  - RTC 4 months    Froy Washington MD  Endocrinology  [phone]

## 2019-09-18 ENCOUNTER — OFFICE VISIT (OUTPATIENT)
Dept: FAMILY MEDICINE | Facility: CLINIC | Age: 59
End: 2019-09-18
Payer: COMMERCIAL

## 2019-09-18 VITALS
SYSTOLIC BLOOD PRESSURE: 116 MMHG | HEART RATE: 84 BPM | BODY MASS INDEX: 28.66 KG/M2 | TEMPERATURE: 97.5 F | OXYGEN SATURATION: 98 % | WEIGHT: 146 LBS | RESPIRATION RATE: 14 BRPM | DIASTOLIC BLOOD PRESSURE: 84 MMHG | HEIGHT: 60 IN

## 2019-09-18 DIAGNOSIS — E11.3299 TYPE 2 DIABETES MELLITUS WITH MILD NONPROLIFERATIVE RETINOPATHY WITHOUT MACULAR EDEMA, WITHOUT LONG-TERM CURRENT USE OF INSULIN, UNSPECIFIED LATERALITY (H): ICD-10-CM

## 2019-09-18 DIAGNOSIS — K21.9 GASTROESOPHAGEAL REFLUX DISEASE, ESOPHAGITIS PRESENCE NOT SPECIFIED: ICD-10-CM

## 2019-09-18 DIAGNOSIS — M54.6 ACUTE MIDLINE THORACIC BACK PAIN: Primary | ICD-10-CM

## 2019-09-18 DIAGNOSIS — I10 ESSENTIAL HYPERTENSION, BENIGN: ICD-10-CM

## 2019-09-18 PROCEDURE — 99214 OFFICE O/P EST MOD 30 MIN: CPT | Performed by: FAMILY MEDICINE

## 2019-09-18 RX ORDER — CARVEDILOL 6.25 MG/1
6.25 TABLET ORAL 2 TIMES DAILY WITH MEALS
Qty: 180 TABLET | Refills: 1 | Status: SHIPPED | OUTPATIENT
Start: 2019-09-18 | End: 2020-05-01

## 2019-09-18 RX ORDER — INFLUENZA VIRUS VACCINE 15; 15; 15; 15 UG/.5ML; UG/.5ML; UG/.5ML; UG/.5ML
SUSPENSION INTRAMUSCULAR
Refills: 0 | COMMUNITY
Start: 2019-08-22 | End: 2020-05-08

## 2019-09-18 RX ORDER — PANTOPRAZOLE SODIUM 40 MG/1
40 TABLET, DELAYED RELEASE ORAL 2 TIMES DAILY
Qty: 180 TABLET | Refills: 3 | Status: SHIPPED | OUTPATIENT
Start: 2019-09-18 | End: 2020-07-02

## 2019-09-18 RX ORDER — CYCLOBENZAPRINE HCL 10 MG
10 TABLET ORAL 2 TIMES DAILY PRN
Qty: 20 TABLET | Refills: 0 | Status: SHIPPED | OUTPATIENT
Start: 2019-09-18 | End: 2020-05-01

## 2019-09-18 RX ORDER — TELMISARTAN 40 MG/1
40 TABLET ORAL DAILY
Qty: 90 TABLET | Refills: 1 | Status: SHIPPED | OUTPATIENT
Start: 2019-09-18 | End: 2020-05-01

## 2019-09-18 ASSESSMENT — ENCOUNTER SYMPTOMS: BACK PAIN: 1

## 2019-09-18 ASSESSMENT — MIFFLIN-ST. JEOR: SCORE: 1163.75

## 2019-09-18 ASSESSMENT — PAIN SCALES - GENERAL: PAINLEVEL: EXTREME PAIN (8)

## 2019-09-18 NOTE — PROGRESS NOTES
Subjective     Lorrie Blake is a 58 year old female who presents to clinic today for the following health issues:    Back Pain        Diabetes:   She presents for follow up of diabetes.  She is not checking blood glucose. Blood sugar time of day: NA.  : NA. : NA. When her blood glucose is low, the patient is asymptomatic for confusion, blurred vision, lethargy and reports not feeling dizzy, shaky, or weak.  She has no concerns regarding her diabetes at this time.  She is having weight gain. The patient has had a diabetic eye exam in the last 12 months. Eye exam performed on 8/2019.    Diabetes Management Resources    Hypertension: She presents for follow up of hypertension.  She does not check blood pressure  regularly outside of the clinic. : NA. She does not follow a low salt diet.      Back pain in mid back and neck onset Monday this week. No trauma.    Back pain  The patient states that she has back and neck pain. The patient notes that pain is located upper back and gives it a stiff neck She notes that this started Tuesday night, and she was unable to sleep. She states that she has difficulty moving around due to the pain. The patient uses Tylenol for pain.   She notes having difficulty breathing due to the pain.  The patient states that she used to have lower back pain when she was overweight. She notes that she consulted a chiropractor for this.   She declines recent injury, recent falls or fever. She notes having some allergies.     Heartburn  The patient states that if she does not take her heartburn medication, she will feel the side effects.   She notes refraining from eating at restaurants.     Blood pressure   The patient states that she ran out of her blood pressure medication. She notes that her prescription was 80 MG, instead of the 40 MG.     Edema   The patient notes that she has noticed her ankles being swollen during the night. She notes not drinking a lot of water throughout the day.      Patient Active Problem List   Diagnosis     Type 2 diabetes mellitus (H)     Overweight (BMI 25.0-29.9)     Fatigue, unspecified type     Type 2 diabetes mellitus with mild nonproliferative retinopathy without macular edema, without long-term current use of insulin, unspecified laterality (H)     Essential hypertension, benign     Gastroesophageal reflux disease, esophagitis presence not specified     History reviewed. No pertinent surgical history.    Social History     Tobacco Use     Smoking status: Never Smoker     Smokeless tobacco: Never Used   Substance Use Topics     Alcohol use: No     Frequency: Never     History reviewed. No pertinent family history.      Current Outpatient Medications   Medication Sig Dispense Refill     Aspirin-Diphenhydramine (GERA-SELTZER PM PO) Take 2 tablets by mouth At Bedtime       blood glucose (NO BRAND SPECIFIED) test strip Use to test blood sugars 3 times daily or as directed. Accu-chek smartview test strips (has Marisela meter) 100 strip 3     blood glucose monitoring (ACCU-CHEK FASTCLIX) lancets Use to test blood sugar 1-2 times daily or as directed. 102 each 3     carvedilol (COREG) 6.25 MG tablet Take 1 tablet (6.25 mg) by mouth 2 times daily (with meals) 180 tablet 0     dapagliflozin (FARXIGA) 10 MG TABS tablet Take one tablet by mouth daily 90 tablet 3     diphenhydrAMINE-acetaminophen (TYLENOL PM)  MG tablet Take 1 tablet by mouth nightly as needed for sleep       docusate sodium (COLACE) 100 MG capsule Take 100 mg by mouth 2 times daily as needed for constipation       FLUARIX QUADRIVALENT 0.5 ML injection ADM 0.5ML IM UTD  0     glipiZIDE (GLUCOTROL XL) 10 MG 24 hr tablet Take 1 tablet (10 mg) by mouth 2 times daily 180 tablet 3     linaclotide (LINZESS) 290 MCG capsule Take 1 capsule (290 mcg) by mouth every morning (before breakfast) 90 capsule 3     melatonin 3 MG tablet Take 3 mg by mouth nightly as needed for sleep       metFORMIN (GLUCOPHAGE) 500 MG  tablet 2 tablets with breakfast and 2 tablet with supper 360 tablet 3     pantoprazole (PROTONIX) 40 MG EC tablet Take 1 tablet (40 mg) by mouth 2 times daily 180 tablet 1     polyethylene glycol (MIRALAX/GLYCOLAX) packet Take 17 g by mouth daily       rosuvastatin (CRESTOR) 5 MG tablet Take 1 tablet (5 mg) by mouth daily 90 tablet 3     topiramate (TOPAMAX) 100 MG tablet Take 1 tablet (100 mg) by mouth daily 90 tablet 3     calcium carbonate (TUMS) 500 MG chewable tablet Take 2 chew tab by mouth Patient take 4-8 tabs daily       telmisartan (MICARDIS) 40 MG tablet Take 1 tablet (40 mg) by mouth daily 30 tablet 0     telmisartan (MICARDIS) 80 MG tablet Take 1 tablet by mouth daily  0     Allergies   Allergen Reactions     Ace Inhibitors Itching     Codeine Hives     Lisinopril Nausea and Cough     Recent Labs   Lab Test 07/22/19 03/18/19 11/12/18  0811 11/12/18 01/22/18  1612  08/28/17  1616  04/06/16  1018  02/27/15  1515   A1C 8.4* 8.5*  --  7.6*   < >  --    < >  --    < >  --    < >  --    LDL  --   --  140*  --   --   --   --  127*  --  133*  --   --    HDL  --   --  48*  --   --   --   --  55  --  64  --   --    TRIG  --   --  240*  --   --   --   --  183*  --  144  --   --    ALT  --   --  23  --   --   --   --   --   --   --   --   --    CR  --   --  0.74  --   --   --   --  0.66  --  0.77   < > 0.69   GFRESTIMATED  --   --  81  --   --   --   --  >90  --  78   < > 89   GFRESTBLACK  --   --  >90  --   --   --   --  >90  --  >90  African American GFR Calc     < > >90   GFR Calc     POTASSIUM  --   --  3.8  --   --   --   --   --   --   --   --  4.2   TSH  --   --   --   --   --  1.07  --   --   --   --   --   --     < > = values in this interval not displayed.      BP Readings from Last 3 Encounters:   09/18/19 116/84   07/22/19 99/68   04/05/19 112/86    Wt Readings from Last 3 Encounters:   09/18/19 66.2 kg (146 lb)   07/22/19 65 kg (143 lb 4.8 oz)   04/05/19 64 kg (141 lb)                     Reviewed and updated as needed this visit by Provider         Review of Systems   Musculoskeletal: Positive for back pain.      This document serves as a record of the services and decisions personally performed and made by Tequila Angela MD. It was created on her behalf by Annie Hurtado, a trained medical scribe. The creation of this document is based the provider's statements to the medical scribe.    Annie Hurtado September 18, 2019 10:54 AM        Objective    /84   Pulse 84   Temp 97.5  F (36.4  C)   Resp 14   Ht 1.524 m (5')   Wt 66.2 kg (146 lb)   SpO2 98%   BMI 28.51 kg/m    Body mass index is 28.51 kg/m .  Physical Exam   GENERAL: healthy, alert and no distress  HENT: mouth without ulcers or lesions  NECK: no adenopathy, no asymmetry, masses, or scars and thyroid normal to palpation  RESP: lungs clear to auscultation - no rales, rhonchi or wheezes  CV: regular rate and rhythm, normal S1 S2, no S3 or S4, no murmur, click or rub, no peripheral edema and peripheral pulses strong  MS: no gross musculoskeletal defects noted, no edema  SKIN: no suspicious lesions or rashes  NEURO: Normal strength and tone, mentation intact and speech normal, normal  strength bilaterally and 5 out of 5 strength bilateral upper extremities   BACK: no CVA tenderness, no paralumbar tenderness, upper back including trapezial ridges has tenderness bilaterally to palpation   Diabetic foot exam: normal DP and PT pulses, no trophic changes or ulcerative lesions, normal sensory exam and normal monofilament exam    Diagnostic Test Results:  No results found for this or any previous visit (from the past 24 hour(s)).        Assessment & Plan     1. Acute midline thoracic back pain  Patient reports having upper back pain and a stiff neck- ongoing since Tuesday.   She reports pain disrupts sleep at night and having difficulty moving around.   Patient reports taking Tylenol for pain.   Patient reports having prior lower  back pain- this occurred when she was overweight. Consulted Chiropractor at the time.   She declines recent injury, recent falls or having a fever.   Discussed plan with patient.  Patient agreed.   Exam showed upper back including trapezial ridges has tenderness bilaterally to palpation.   Normal  strength bilaterally and 5 out of 5 strength bilateral upper extremities.   Flexeril has been prescribed.  Advised patient of drowsiness that she can experience with muscle relaxant.   Advised patient that she can apply heat and/or ice to area of pain.   If symptoms worsen or persist, patient will contact me.   - cyclobenzaprine (FLEXERIL) 10 MG tablet; Take 1 tablet (10 mg) by mouth 2 times daily as needed for muscle spasms  Dispense: 20 tablet; Refill: 0    2. Type 2 diabetes mellitus with mild nonproliferative retinopathy without macular edema, without long-term current use of insulin, unspecified laterality (H)  Patient is under care of Endocrinology.   No refills are requested at this time as medication is managed by specialist.   Patient will continue to follow up with Endocrinology as planned.     3. Essential hypertension, benign  Patient reports Micardis that was refilled previously was refilled at 40 MG daily- instead of the 80 MG daily prescription.   Patient's blood pressure medication is within range while on 40 MG daily- will continue with 40 MG daily.   Refills have been ordered.   - telmisartan (MICARDIS) 40 MG tablet; Take 1 tablet (40 mg) by mouth daily  Dispense: 90 tablet; Refill: 1  - carvedilol (COREG) 6.25 MG tablet; Take 1 tablet (6.25 mg) by mouth 2 times daily (with meals)  Dispense: 180 tablet; Refill: 1    4. Gastroesophageal reflux disease, esophagitis presence not specified  Doing well. Well controlled. Tolerating medication.  No change in plan.   Refills have been ordered.   - pantoprazole (PROTONIX) 40 MG EC tablet; Take 1 tablet (40 mg) by mouth 2 times daily  Dispense: 180 tablet;  Refill: 3     BMI:   Estimated body mass index is 28.51 kg/m  as calculated from the following:    Height as of this encounter: 1.524 m (5').    Weight as of this encounter: 66.2 kg (146 lb).   Weight management plan: Discussed healthy diet and exercise guidelines    Follow up- Come back in 6 months for recheck.     The information in this document, created by the medical scribe for me, accurately reflects the services I personally performed and the decisions made by me. I have reviewed and approved this document for accuracy prior to leaving the patient care area.    HANNAH ZENDEJAS MD, MD  [facility]

## 2019-11-06 ENCOUNTER — OFFICE VISIT (OUTPATIENT)
Dept: FAMILY MEDICINE | Facility: CLINIC | Age: 59
End: 2019-11-06
Payer: COMMERCIAL

## 2019-11-06 ENCOUNTER — TELEPHONE (OUTPATIENT)
Dept: FAMILY MEDICINE | Facility: CLINIC | Age: 59
End: 2019-11-06

## 2019-11-06 VITALS
RESPIRATION RATE: 16 BRPM | WEIGHT: 146.7 LBS | HEART RATE: 80 BPM | SYSTOLIC BLOOD PRESSURE: 124 MMHG | TEMPERATURE: 97.3 F | DIASTOLIC BLOOD PRESSURE: 88 MMHG | HEIGHT: 60 IN | BODY MASS INDEX: 28.8 KG/M2

## 2019-11-06 DIAGNOSIS — E11.3299 TYPE 2 DIABETES MELLITUS WITH MILD NONPROLIFERATIVE RETINOPATHY WITHOUT MACULAR EDEMA, WITHOUT LONG-TERM CURRENT USE OF INSULIN, UNSPECIFIED LATERALITY (H): ICD-10-CM

## 2019-11-06 DIAGNOSIS — Z98.890 HISTORY OF RECENT DENTAL PROCEDURE: Primary | ICD-10-CM

## 2019-11-06 DIAGNOSIS — Z96.653 HISTORY OF BILATERAL KNEE REPLACEMENT: ICD-10-CM

## 2019-11-06 DIAGNOSIS — Z91.199 NON COMPLIANCE WITH MEDICAL TREATMENT: ICD-10-CM

## 2019-11-06 DIAGNOSIS — Z59.9 FINANCIAL DIFFICULTIES: ICD-10-CM

## 2019-11-06 PROCEDURE — 99214 OFFICE O/P EST MOD 30 MIN: CPT | Performed by: FAMILY MEDICINE

## 2019-11-06 RX ORDER — AMOXICILLIN 500 MG/1
2000 TABLET, FILM COATED ORAL ONCE
Qty: 4 TABLET | Refills: 0 | Status: SHIPPED | OUTPATIENT
Start: 2019-11-06 | End: 2020-05-01

## 2019-11-06 SDOH — ECONOMIC STABILITY - INCOME SECURITY: PROBLEM RELATED TO HOUSING AND ECONOMIC CIRCUMSTANCES, UNSPECIFIED: Z59.9

## 2019-11-06 ASSESSMENT — MIFFLIN-ST. JEOR: SCORE: 1173.8

## 2019-11-06 NOTE — PATIENT INSTRUCTIONS
Important Takeaway Points From This Visit:    I have prescribed you an antibiotic to take once. This may be less effective than if you took it before the procedure. Also, there are side effects we discussed that could happen by taking this medication.      As always, please call with any questions or concerns. I look forward to seeing you again soon!    Take care,  Dr. Reyes    Your current medication list is printed. Please keep this with you - it is helpful to bring this current list to any other medical appointments. It can also be helpful if you ever go to the emergency room or hospital.    If you had lab testing today we will call you with the results. The phone number we will call with your results is # [phone] (home) none (work). If this is not the best number please call our clinic and change the number.    If you need any refills, please call your pharmacy and they will contact us.    If you have any further concerns or wish to schedule another appointment, please call our office at [phone].    If you have a medical emergency, please call [phone].    Thank you for coming to [facility]!

## 2019-11-06 NOTE — PROGRESS NOTES
Subjective     Lorrie Blake is a 58 year old female who presents to clinic today for the following health issues:    HPI   Patient was seen last week at her dentist for a cavity. Patient is here to request an antibiotic.    Patient states that she has had 2 dental procedures done within the past week.  The last one was done approximately 15 to 30 minutes prior to her coming in to see me.  She states she is previously received antibiotics for history of diabetes and bilateral knee replacements in the past from multiple different providers.  She is also seeing a new dentist today for her.  She is having financial difficulties due to a divorce and has not been taking any of her medications as outlined below.  She has minimal insurance left until the divorce finalizes so she is trying to get things knocked off her list such as her dental care.    She is requesting an antibiotic to take to prevent infection.    Diabetes Follow-up      How often are you checking your blood sugar? Not at all    What concerns do you have today about your diabetes? None     Do you have any of these symptoms? (Select all that apply)  No numbness or tingling in feet.  No redness, sores or blisters on feet.  No complaints of excessive thirst.  No reports of blurry vision.  No significant changes to weight.     Have you had a diabetic eye exam in the last 12 months? Yes- Date of last eye exam: 02/19    BP Readings from Last 2 Encounters:   11/06/19 124/88   09/18/19 116/84     Hemoglobin A1C (%)   Date Value   07/22/2019 8.4 (A)   03/18/2019 8.5 (A)     LDL Cholesterol Calculated (mg/dL)   Date Value   11/12/2018 140 (H)   08/28/2017 127 (H)     Unfortunately due to financial difficulties (divorce) she currently does not have insurance coverage that is able to pay her medications.  Therefore she has not been taking any of her medications at this time other than her Linzess for irritable bowel syndrome.  She has not been taking these  "medications since approximately August.  Due to financial difficulty she also declines having a repeat A1c, lipids, BMP, or other blood work done today.  She is very concerned about the dental procedure above because \"I am a very slow healer because of my diabetes \".    Hypertension Follow-up      Do you check your blood pressure regularly outside of the clinic? No     Are you following a low salt diet? No    Are your blood pressures ever more than 140 on the top number (systolic) OR more   than 90 on the bottom number (diastolic), for example 140/90? No      Patient Active Problem List   Diagnosis     Overweight (BMI 25.0-29.9)     Fatigue, unspecified type     Type 2 diabetes mellitus with mild nonproliferative retinopathy without macular edema, without long-term current use of insulin, unspecified laterality (H)     Essential hypertension, benign     Gastroesophageal reflux disease, esophagitis presence not specified     Non compliance with medical treatment     History reviewed. No pertinent surgical history.    Social History     Tobacco Use     Smoking status: Never Smoker     Smokeless tobacco: Never Used   Substance Use Topics     Alcohol use: No     Frequency: Never     History reviewed. No pertinent family history.      Current Outpatient Medications   Medication Sig Dispense Refill     amoxicillin (AMOXIL) 500 MG tablet Take 4 tablets (2,000 mg) by mouth once for 1 dose 4 tablet 0     Aspirin-Diphenhydramine (GERA-SELTZER PM PO) Take 2 tablets by mouth At Bedtime       blood glucose (NO BRAND SPECIFIED) test strip Use to test blood sugars 3 times daily or as directed. Accu-chek smartview test strips (has Marisela meter) (Patient not taking: Reported on 11/6/2019) 100 strip 3     blood glucose monitoring (ACCU-CHEK FASTCLIX) lancets Use to test blood sugar 1-2 times daily or as directed. (Patient not taking: Reported on 11/6/2019) 102 each 3     calcium carbonate (TUMS) 500 MG chewable tablet Take 2 chew tab " by mouth Patient take 4-8 tabs daily       carvedilol (COREG) 6.25 MG tablet Take 1 tablet (6.25 mg) by mouth 2 times daily (with meals) (Patient not taking: Reported on 11/6/2019) 180 tablet 1     cyclobenzaprine (FLEXERIL) 10 MG tablet Take 1 tablet (10 mg) by mouth 2 times daily as needed for muscle spasms (Patient not taking: Reported on 11/6/2019) 20 tablet 0     dapagliflozin (FARXIGA) 10 MG TABS tablet Take one tablet by mouth daily (Patient not taking: Reported on 11/6/2019) 90 tablet 3     diphenhydrAMINE-acetaminophen (TYLENOL PM)  MG tablet Take 1 tablet by mouth nightly as needed for sleep       docusate sodium (COLACE) 100 MG capsule Take 100 mg by mouth 2 times daily as needed for constipation       FLUARIX QUADRIVALENT 0.5 ML injection ADM 0.5ML IM UTD  0     glipiZIDE (GLUCOTROL XL) 10 MG 24 hr tablet Take 1 tablet (10 mg) by mouth 2 times daily (Patient not taking: Reported on 11/6/2019) 180 tablet 3     linaclotide (LINZESS) 290 MCG capsule Take 1 capsule (290 mcg) by mouth every morning (before breakfast) (Patient not taking: Reported on 11/6/2019) 90 capsule 3     melatonin 3 MG tablet Take 3 mg by mouth nightly as needed for sleep       metFORMIN (GLUCOPHAGE) 500 MG tablet 2 tablets with breakfast and 2 tablet with supper (Patient not taking: Reported on 11/6/2019) 360 tablet 3     pantoprazole (PROTONIX) 40 MG EC tablet Take 1 tablet (40 mg) by mouth 2 times daily (Patient not taking: Reported on 11/6/2019) 180 tablet 3     polyethylene glycol (MIRALAX/GLYCOLAX) packet Take 17 g by mouth daily       rosuvastatin (CRESTOR) 5 MG tablet Take 1 tablet (5 mg) by mouth daily (Patient not taking: Reported on 11/6/2019) 90 tablet 3     telmisartan (MICARDIS) 40 MG tablet Take 1 tablet (40 mg) by mouth daily (Patient not taking: Reported on 11/6/2019) 90 tablet 1     topiramate (TOPAMAX) 100 MG tablet Take 1 tablet (100 mg) by mouth daily (Patient not taking: Reported on 11/6/2019) 90 tablet 3  "    Allergies   Allergen Reactions     Ace Inhibitors Itching     Codeine Hives     Lisinopril Nausea and Cough       Reviewed and updated as needed this visit by Provider  Tobacco  Allergies  Meds  Problems  Med Hx  Surg Hx  Fam Hx         Review of Systems   ROS COMP: Constitutional, HEENT, cardiovascular, pulmonary, gi and gu systems are negative, except as otherwise noted.      Objective    /88   Pulse 80   Temp 97.3  F (36.3  C) (Temporal)   Resp 16   Ht 1.535 m (5' 0.43\")   Wt 66.5 kg (146 lb 11.2 oz)   BMI 28.24 kg/m    Body mass index is 28.24 kg/m .  Physical Exam   General: Appears well and in no acute distress.  HEENT: Recent dental work on the right.  No obvious hematoma, significant swelling.  Eyes grossly normal to inspection. Extraocular movements intact. Pupils equal, round, and reactive to light. Mucous membranes moist. No ulcers or lesions noted in the oropharynx.   Heme/Lymph: No supraclavicular, anterior, or posterior cervical lymphadenopathy.   Cardiovascular: Regular rate and rhythm, normal S1 and S2 without murmur. No extra heartsounds or friction rub. Radial pulses present and equal bilaterally.  Respiratory: Lungs clear to auscultation bilaterally. No wheezing or crackles. No prolonged expiration. Symmetrical chest rise.    Diagnostic Test Results:  none         Assessment & Plan   1. History of recent dental procedure: I did outline the most recent AAOS and ADA clinical practice guidelines summary for the prevention of orthopedic implant infection patients undergoing dental procedures.  I discussed that it is unclear if there is benefit for those with orthopedic implants, while there is a higher risk for complications such as C. difficile, bacterial resistance, or other side effects from the medication.  I do recognize that she is likely an uncontrolled diabetic as she has not been taking her medications and her last A1c was greater than 8.  Therefore she is at least " somewhat immunocompromised compared to the average individual.  After discussing the risks and the benefits and willing to prescribe her a one-time dose of antibiotics.  I did discuss that as this is taking after the procedure rather than prophylactically prior to the procedure the efficacy is likely less.  Patient is agreeable with this plan.  - amoxicillin (AMOXIL) 500 MG tablet; Take 4 tablets (2,000 mg) by mouth once for 1 dose  Dispense: 4 tablet; Refill: 0    2. History of bilateral knee replacement: Discussed AUS and ADA guidelines as outlined above.  Recent dental procedure.    3. Type 2 diabetes mellitus with mild nonproliferative retinopathy without macular edema, without long-term current use of insulin, unspecified laterality (H): Uncontrolled.  Last A1c greater than 8.  States she has not been taking her medication since August and therefore likely worsening.  She declines blood testing due to financial difficulty at this time.  She will return for follow-up regarding her chronic illnesses once she is more settled.  She is also currently unemployed.  I recommended that many of her diabetes medications are older and therefore likely extremely cheap.  I asked her to look into the prices of at least taking her metformin and glipizide medications.  I discussed that improving her blood sugar control will help prevent infection to improve her immune system as well as improve her healing ability after procedures.    4. Non compliance with medical treatment: Noncompliance with diabetes medications due to financial difficulties.    5. Financial difficulties: Unemployed.  Lives with parents.  Limited insurance coverage at this time.  Declines appropriate chronic is monitoring and is having difficulties picking up her medications complicating her care.      Return in about 1 week (around 11/13/2019), or if symptoms worsen or fail to improve.    Jeyson Reyes MD  [facility]    This chart is  completed utilizing dictation software; typos and/or incorrect word substitutions may unintentionally occur.      I spent greater than 30 minutes with this patient much more than 50% of the time spent counseling regarding risks and benefits as documented above.

## 2019-12-03 ENCOUNTER — TELEPHONE (OUTPATIENT)
Dept: FAMILY MEDICINE | Facility: CLINIC | Age: 59
End: 2019-12-03

## 2019-12-03 NOTE — TELEPHONE ENCOUNTER
Summary:    Patient is due/failing the following:   Diabetic follow up, BMP, Microalbumin, A1C and LDL    Action needed:   Patient needs office visit for Diabetic follow up . and Patient needs fasting lab only appointment    Type of outreach:    Phone, left message for patient to call back.     Questions for provider review:    None                                                                                                                                    Liz Jarrell CMA       Chart routed to Care Team .        Panel Management Review      Patient has the following on her problem list:     Diabetes    ASA: Passed    Last A1C  Lab Results   Component Value Date    A1C 8.4 07/22/2019    A1C 8.5 03/18/2019    A1C 7.6 11/12/2018    A1C 8.0 06/11/2018    A1C 7.6 01/22/2018     A1C tested: FAILED    Last LDL:    Lab Results   Component Value Date    CHOL 236 11/12/2018     Lab Results   Component Value Date    HDL 48 11/12/2018     Lab Results   Component Value Date     11/12/2018     Lab Results   Component Value Date    TRIG 240 11/12/2018     No results found for: CHOLHDLRATIO  Lab Results   Component Value Date    NHDL 188 11/12/2018       Is the patient on a Statin? YES             Is the patient on Aspirin? NO    Medications     HMG CoA Reductase Inhibitors     rosuvastatin (CRESTOR) 5 MG tablet             Last three blood pressure readings:  BP Readings from Last 3 Encounters:   11/06/19 124/88   09/18/19 116/84   07/22/19 99/68          Tobacco History:     History   Smoking Status     Never Smoker   Smokeless Tobacco     Never Used         Hypertension   Last three blood pressure readings:  BP Readings from Last 3 Encounters:   11/06/19 124/88   09/18/19 116/84   07/22/19 99/68     Blood pressure: Passed    HTN Guidelines:  Less than 140/90      Composite cancer screening  Chart review shows that this patient is due/due soon for the following Pap Smear, Mammogram and Colonoscopy

## 2020-01-15 ENCOUNTER — TELEPHONE (OUTPATIENT)
Dept: FAMILY MEDICINE | Facility: CLINIC | Age: 60
End: 2020-01-15

## 2020-01-15 DIAGNOSIS — E78.5 HYPERLIPIDEMIA LDL GOAL <100: ICD-10-CM

## 2020-01-15 RX ORDER — ROSUVASTATIN CALCIUM 5 MG/1
5 TABLET, COATED ORAL DAILY
Qty: 90 TABLET | Refills: 0 | Status: SHIPPED | OUTPATIENT
Start: 2020-01-15 | End: 2020-05-01

## 2020-01-15 RX ORDER — ROSUVASTATIN CALCIUM 5 MG/1
5 TABLET, COATED ORAL DAILY
Qty: 30 TABLET | Refills: 0 | Status: SHIPPED | OUTPATIENT
Start: 2020-01-15 | End: 2020-01-15

## 2020-01-15 NOTE — TELEPHONE ENCOUNTER
Reason for Call:  Medication or medication refill:    Do you use a [facility]?  Name of the pharmacy and phone number for the current request:  [facility] [address] [place] [address]   [phone]    Name of the medication requested: rosuvastatin (CRESTOR) 5 MG tablet     Other request:     Can we leave a detailed message on this number? YES    Phone number patient can be reached at: Home number on file [phone] (home)         Best Time: any    Call taken on 1/15/2020 at 3:40 PM by Adry Morgan

## 2020-01-15 NOTE — TELEPHONE ENCOUNTER
Reason for Call:  Other call back    Detailed comments: Patient was informed that she will need a lab appointment and an OV before getting any more refills of rosuvastatin. Patient said she is going through a divorce and her  said she cannot call the union to find out when her insurance will end through her (ex) . She does not have a job nor insurance and is worried about bills stacking up and does not think she will be able to come in for labs or an office visit. Please call patient to discuss.       Phone Number Patient can be reached at: Home number on file [phone] (home)    Best Time: any    Can we leave a detailed message on this number? YES    Call taken on 1/15/2020 at 4:00 PM by Adry Morgan

## 2020-01-15 NOTE — LETTER
[facility] [place]  [address], [address]  [place] [address]  [phone]          January 17, 2020    Lorrie Blake                                                                                                                     [address] [address]        Dear Lorrie,    I have sent a 90 day supply of the rosuvastatin to the pharmacy for you.     It is important after that time that we check in on labs to be sure not effect on her liver at least. If still no insurance can help to look for other ways to accomplish this. You can call us at [phone] to schedule this appointment.        Sincerely,   Tequila Angela MD        [place]

## 2020-01-15 NOTE — TELEPHONE ENCOUNTER
I have sent a 90 day supply of the rosuvastatin.     It is important after that time that we check in on labs to be sure not effect on her liver at least. If still no insurance can help to look for other ways to accomplish this.

## 2020-01-15 NOTE — TELEPHONE ENCOUNTER
Given 30-day refill for bettye period. Needs appointment.    Jeyson Reyes MD  [facility]

## 2020-01-29 ENCOUNTER — TELEPHONE (OUTPATIENT)
Dept: FAMILY MEDICINE | Facility: CLINIC | Age: 60
End: 2020-01-29

## 2020-01-29 NOTE — TELEPHONE ENCOUNTER
Please abstract the following data from this visit with this patient into the appropriate field in Epic:    Tests that can be patient reported without a hard copy:    Colonoscopy done on this date: 07/29/2011 (approximately), by this group: [facility], results in care everywhere.     Other Tests found in the patient's chart through Chart Review/Care Everywhere:        Note to Abstraction: If this section is blank, no results were found via Chart Review/Care Everywhere.        Summary:    Patient is due/failing the following:   Diabetic follow up, A1C, LDL, BMP,colonoscopy, MAMMOGRAM, PAP and PHYSICAL    Action needed:   Patient needs office visit for Physical . and Patient needs fasting lab only appointment    Type of outreach:    none patient declines    Questions for provider review:    None                                                                                                                                    Liz Jarrell CMA       Chart routed to Care Team .

## 2020-05-01 ENCOUNTER — VIRTUAL VISIT (OUTPATIENT)
Dept: FAMILY MEDICINE | Facility: OTHER | Age: 60
End: 2020-05-01
Payer: COMMERCIAL

## 2020-05-01 DIAGNOSIS — E78.5 HYPERLIPIDEMIA LDL GOAL <100: ICD-10-CM

## 2020-05-01 DIAGNOSIS — I10 ESSENTIAL HYPERTENSION, BENIGN: ICD-10-CM

## 2020-05-01 DIAGNOSIS — K59.04 CHRONIC IDIOPATHIC CONSTIPATION: ICD-10-CM

## 2020-05-01 DIAGNOSIS — E11.3299 TYPE 2 DIABETES MELLITUS WITH MILD NONPROLIFERATIVE RETINOPATHY WITHOUT MACULAR EDEMA, WITHOUT LONG-TERM CURRENT USE OF INSULIN, UNSPECIFIED LATERALITY (H): Primary | ICD-10-CM

## 2020-05-01 PROCEDURE — 99214 OFFICE O/P EST MOD 30 MIN: CPT | Mod: TEL | Performed by: PHYSICIAN ASSISTANT

## 2020-05-01 RX ORDER — ROSUVASTATIN CALCIUM 5 MG/1
5 TABLET, COATED ORAL DAILY
Qty: 90 TABLET | Refills: 0 | Status: SHIPPED | OUTPATIENT
Start: 2020-05-01 | End: 2020-07-02

## 2020-05-01 RX ORDER — LANCETS
EACH MISCELLANEOUS
Qty: 100 EACH | Refills: 6 | Status: SHIPPED | OUTPATIENT
Start: 2020-05-01

## 2020-05-01 RX ORDER — TELMISARTAN 40 MG/1
40 TABLET ORAL DAILY
Qty: 90 TABLET | Refills: 1 | Status: SHIPPED | OUTPATIENT
Start: 2020-05-01 | End: 2020-07-02

## 2020-05-01 RX ORDER — GLUCOSAMINE HCL/CHONDROITIN SU 500-400 MG
CAPSULE ORAL
Qty: 100 EACH | Refills: 3 | Status: SHIPPED | OUTPATIENT
Start: 2020-05-01

## 2020-05-01 RX ORDER — CARVEDILOL 6.25 MG/1
6.25 TABLET ORAL 2 TIMES DAILY WITH MEALS
Qty: 180 TABLET | Refills: 1 | Status: SHIPPED | OUTPATIENT
Start: 2020-05-01 | End: 2020-07-02

## 2020-05-01 NOTE — PROGRESS NOTES
"Lorrie Blake is a 59 year old female who is being evaluated via a billable telephone visit.      The patient has been notified of following:     \"This telephone visit will be conducted via a call between you and your physician/provider. We have found that certain health care needs can be provided without the need for a physical exam.  This service lets us provide the care you need with a short phone conversation.  If a prescription is necessary we can send it directly to your pharmacy.  If lab work is needed we can place an order for that and you can then stop by our lab to have the test done at a later time.    Telephone visits are billed at different rates depending on your insurance coverage. During this emergency period, for some insurers they may be billed the same as an in-person visit.  Please reach out to your insurance provider with any questions.    If during the course of the call the physician/provider feels a telephone visit is not appropriate, you will not be charged for this service.\"    Patient has given verbal consent for Telephone visit?  Yes    How would you like to obtain your AVS? Mail a copy    Subjective     Lorrie Blake is a 59 year old female who presents to clinic today for the following health issues:    HPI    Discuss medication Linzess 290 mg -   Stomach issues.  She feels it is helping her constipation situation.   Patient requesting refill of this medication today. She reports overall this has been helpful for her chronic constipation.  She reports without with medication she does not have a bowel movement. Still often needs to use a stool softener and will increase water intake. She also reports making dietary changes to accommodate. Able to have normal bowel movements most days of the week.    Diabetes- going through a divorce since last fall. Packed up quickly and cannot find her glucose meter therefore has not been checking. Has not been seen for this since July. She " reports she overall feels well. Did have influenza this winter and noticed more low feeling at that time but that has since resolved.     Blood pressure -  Numbers have been well controlled when checking outside of clinic. Tolerating medications well without side effects. Monitoring salt in diet. Patient denies headaches, vision changes, chest pain, shortness of breath or paresthesias.    Patient Active Problem List   Diagnosis     Overweight (BMI 25.0-29.9)     Fatigue, unspecified type     Type 2 diabetes mellitus with mild nonproliferative retinopathy without macular edema, without long-term current use of insulin, unspecified laterality (H)     Essential hypertension, benign     Gastroesophageal reflux disease, esophagitis presence not specified     Non compliance with medical treatment     No past surgical history on file.    Social History     Tobacco Use     Smoking status: Never Smoker     Smokeless tobacco: Never Used   Substance Use Topics     Alcohol use: No     Frequency: Never     No family history on file.      Current Outpatient Medications   Medication Sig Dispense Refill     alcohol swab prep pads Use to swab area of injection/avi as directed. 100 each 3     blood glucose (NO BRAND SPECIFIED) test strip Use to test blood sugar 4 times daily or as directed. To accompany: Blood Glucose Monitor Brands: per insurance. 100 strip 6     blood glucose calibration (NO BRAND SPECIFIED) solution To accompany: Blood Glucose Monitor Brands: per insurance. 1 Bottle 3     blood glucose monitoring (NO BRAND SPECIFIED) meter device kit Use to test blood sugar 4 times daily or as directed. Preferred blood glucose meter OR supplies to accompany: Blood Glucose Monitor Brands: per insurance. 1 kit 0     calcium carbonate (TUMS) 500 MG chewable tablet Take 2 chew tab by mouth Patient take 4-8 tabs daily       carvedilol (COREG) 6.25 MG tablet Take 1 tablet (6.25 mg) by mouth 2 times daily (with meals) 180 tablet 1      dapagliflozin (FARXIGA) 10 MG TABS tablet Take one tablet by mouth daily 90 tablet 3     diphenhydrAMINE-acetaminophen (TYLENOL PM)  MG tablet Take 1 tablet by mouth nightly as needed for sleep       docusate sodium (COLACE) 100 MG capsule Take 100 mg by mouth 2 times daily as needed for constipation       glipiZIDE (GLUCOTROL XL) 10 MG 24 hr tablet Take 1 tablet (10 mg) by mouth 2 times daily 180 tablet 3     linaclotide (LINZESS) 290 MCG capsule Take 1 capsule (290 mcg) by mouth every morning (before breakfast) 90 capsule 3     melatonin 3 MG tablet Take 3 mg by mouth nightly as needed for sleep       metFORMIN (GLUCOPHAGE) 500 MG tablet 2 tablets with breakfast and 2 tablet with supper 360 tablet 3     pantoprazole (PROTONIX) 40 MG EC tablet Take 1 tablet (40 mg) by mouth 2 times daily 180 tablet 3     polyethylene glycol (MIRALAX/GLYCOLAX) packet Take 17 g by mouth daily       rosuvastatin (CRESTOR) 5 MG tablet Take 1 tablet (5 mg) by mouth daily 90 tablet 0     telmisartan (MICARDIS) 40 MG tablet Take 1 tablet (40 mg) by mouth daily 90 tablet 1     thin (NO BRAND SPECIFIED) lancets Use with lanceting device. To accompany: Blood Glucose Monitor Brands: per insurance. 100 each 6     topiramate (TOPAMAX) 100 MG tablet Take 1 tablet (100 mg) by mouth daily 90 tablet 3     Aspirin-Diphenhydramine (GERA-SELTZER PM PO) Take 2 tablets by mouth At Bedtime       FLUARIX QUADRIVALENT 0.5 ML injection ADM 0.5ML IM UTD  0     Allergies   Allergen Reactions     Ace Inhibitors Itching     Codeine Hives     Lisinopril Nausea and Cough       Reviewed and updated as needed this visit by Provider         Review of Systems   ROS COMP: Constitutional, HEENT, cardiovascular, pulmonary, gi and gu systems are negative, except as otherwise noted.       Objective   Reported vitals:  There were no vitals taken for this visit.   PSYCH: Alert and oriented times 3; coherent speech, normal   rate and volume, able to articulate logical  thoughts, able   to abstract reason, no tangential thoughts, no hallucinations   or delusions  Her affect is normal  RESP: No cough, no audible wheezing, able to talk in full sentences  Remainder of exam unable to be completed due to telephone visits    Diagnostic Test Results:  Labs reviewed in Epic  none         Assessment/Plan:  1. Essential hypertension, benign  Medications refilled today. Encouraged she continue to monitor this. Diet and exercise modifications also encouraged.   - carvedilol (COREG) 6.25 MG tablet; Take 1 tablet (6.25 mg) by mouth 2 times daily (with meals)  Dispense: 180 tablet; Refill: 1  - telmisartan (MICARDIS) 40 MG tablet; Take 1 tablet (40 mg) by mouth daily  Dispense: 90 tablet; Refill: 1    2. Chronic idiopathic constipation  Stable with daily use of Linzess.   - linaclotide (LINZESS) 290 MCG capsule; Take 1 capsule (290 mcg) by mouth every morning (before breakfast)  Dispense: 90 capsule; Refill: 3    3. Hyperlipidemia LDL goal <100  Will be due for labs but will defer at this time due to COVID 19.   - rosuvastatin (CRESTOR) 5 MG tablet; Take 1 tablet (5 mg) by mouth daily  Dispense: 90 tablet; Refill: 0    4. Type 2 diabetes mellitus with mild nonproliferative retinopathy without macular edema, without long-term current use of insulin, unspecified laterality (H)  Due for labs but will defer at this time. I did recommend patient monitor her sugars at home. Will send order for new glucose monitor to pharmacy.   - blood glucose monitoring (NO BRAND SPECIFIED) meter device kit; Use to test blood sugar 4 times daily or as directed. Preferred blood glucose meter OR supplies to accompany: Blood Glucose Monitor Brands: per insurance.  Dispense: 1 kit; Refill: 0  - blood glucose (NO BRAND SPECIFIED) test strip; Use to test blood sugar 4 times daily or as directed. To accompany: Blood Glucose Monitor Brands: per insurance.  Dispense: 100 strip; Refill: 6  - blood glucose calibration (NO BRAND  SPECIFIED) solution; To accompany: Blood Glucose Monitor Brands: per insurance.  Dispense: 1 Bottle; Refill: 3  - thin (NO BRAND SPECIFIED) lancets; Use with lanceting device. To accompany: Blood Glucose Monitor Brands: per insurance.  Dispense: 100 each; Refill: 6  - alcohol swab prep pads; Use to swab area of injection/avi as directed.  Dispense: 100 each; Refill: 3    Phone call duration:  9 minutes    Alana Pennington PA-C

## 2020-05-08 ENCOUNTER — TELEPHONE (OUTPATIENT)
Dept: FAMILY MEDICINE | Facility: CLINIC | Age: 60
End: 2020-05-08

## 2020-05-08 ENCOUNTER — VIRTUAL VISIT (OUTPATIENT)
Dept: FAMILY MEDICINE | Facility: OTHER | Age: 60
End: 2020-05-08
Payer: COMMERCIAL

## 2020-05-08 ENCOUNTER — NURSE TRIAGE (OUTPATIENT)
Dept: NURSING | Facility: CLINIC | Age: 60
End: 2020-05-08

## 2020-05-08 DIAGNOSIS — L97.511 DIABETIC ULCER OF OTHER PART OF RIGHT FOOT ASSOCIATED WITH TYPE 2 DIABETES MELLITUS, LIMITED TO BREAKDOWN OF SKIN (H): Primary | ICD-10-CM

## 2020-05-08 DIAGNOSIS — E11.621 DIABETIC ULCER OF OTHER PART OF RIGHT FOOT ASSOCIATED WITH TYPE 2 DIABETES MELLITUS, LIMITED TO BREAKDOWN OF SKIN (H): Primary | ICD-10-CM

## 2020-05-08 PROCEDURE — 99214 OFFICE O/P EST MOD 30 MIN: CPT | Mod: 95 | Performed by: FAMILY MEDICINE

## 2020-05-08 RX ORDER — SULFAMETHOXAZOLE/TRIMETHOPRIM 800-160 MG
1 TABLET ORAL 2 TIMES DAILY
Qty: 20 TABLET | Refills: 0 | Status: SHIPPED | OUTPATIENT
Start: 2020-05-08 | End: 2020-07-02

## 2020-05-08 RX ORDER — LEVOFLOXACIN 750 MG/1
750 TABLET, FILM COATED ORAL DAILY
Qty: 10 TABLET | Refills: 0 | Status: SHIPPED | OUTPATIENT
Start: 2020-05-08 | End: 2020-07-02

## 2020-05-08 NOTE — TELEPHONE ENCOUNTER
Called patient back and discussed dressing concerns.    Jeyson Reyes MD  [facility]

## 2020-05-08 NOTE — PATIENT INSTRUCTIONS
Important Takeaway Points From This Visit:    I have given you 2 antibiotics to take.    Please update me on Monday regarding your symptoms.    If there is worsening of swelling or redness, or development of fevers or chills, etc over the next 48 hours after starting the antibiotic I need you to be seen in person.      As always, please call with any questions or concerns. I look forward to seeing you again soon!    Take care,  Dr. Reyes    Your current medication list is printed. Please keep this with you - it is helpful to bring this current list to any other medical appointments. It can also be helpful if you ever go to the emergency room or hospital.    If you had lab testing today we will call you with the results. The phone number we will call with your results is # [phone] (home) none (work). If this is not the best number please call our clinic and change the number.    If you need any refills, please call your pharmacy and they will contact us.    If you have any further concerns or wish to schedule another appointment, please call our office at [phone].    If you have a medical emergency, please call [phone].    Thank you for coming to [facility]!

## 2020-05-08 NOTE — PROGRESS NOTES
"Lorrie Blake is a 59 year old female who is being evaluated via a billable video visit.      The patient has been notified of following:     \"This video visit will be conducted via a call between you and your physician/provider. We have found that certain health care needs can be provided without the need for an in-person physical exam.  This service lets us provide the care you need with a video conversation.  If a prescription is necessary we can send it directly to your pharmacy.  If lab work is needed we can place an order for that and you can then stop by our lab to have the test done at a later time.    Video visits are billed at different rates depending on your insurance coverage.  Please reach out to your insurance provider with any questions.    If during the course of the call the physician/provider feels a video visit is not appropriate, you will not be charged for this service.\"    Patient has given verbal consent for Video visit? Yes    How would you like to obtain your AVS? Mail a copy    Patient would like the video invitation sent by: Text to cell phone: [phone]    Will anyone else be joining your video visit? No        Subjective     Lorrie Blake is a 59 year old female who presents to clinic today for the following health issues:    HPI     Concern - Right ankle   Onset: Sunday    Description:   Top of the ankle wound. Did go for a walk on Sunday, no known injury or bug bite. This is about dime sized now and is getting bigger. Area is red, stings and is warm. Leg is swollen up to the knee without redness there.     Left leg is now starting to do the same but very mild right now     Intensity: moderate    Progression of Symptoms:  worsening    Therapies Tried and outcome: neosporin    Video Start Time: 11:44    Patient states she went for a walk with some friends on Sunday.  Her foot was fine putting on her socks and shoes prior to the walk.  When she returned she noticed an " ulceration on the top of her right ankle.  She had placed bacitracin and a Band-Aid over it.  When she checked it on the following days due to increasing and pain there were 2 more ulcerations on the lateral aspect.  There is one white spot on the middle of her original ulceration that she describes as the size of a dime.  There is no tracking deep or purulent discharge noted by the patient.    She does not notice much surrounding erythema; however, there is surrounding swelling she thinks goes up close to her knee.  She denies any fever, chills, nausea, vomiting.  She has not had anything like this in the past.  She has no other concerns at this time.  She denies any paresthesias in her legs.  It does appear she has had poor follow-up as of late with her diabetes cares.    Patient Active Problem List   Diagnosis     Overweight (BMI 25.0-29.9)     Fatigue, unspecified type     Type 2 diabetes mellitus with mild nonproliferative retinopathy without macular edema, without long-term current use of insulin, unspecified laterality (H)     Essential hypertension, benign     Gastroesophageal reflux disease, esophagitis presence not specified     Non compliance with medical treatment     History reviewed. No pertinent surgical history.    Social History     Tobacco Use     Smoking status: Never Smoker     Smokeless tobacco: Never Used   Substance Use Topics     Alcohol use: No     Frequency: Never     History reviewed. No pertinent family history.      Current Outpatient Medications   Medication Sig Dispense Refill     alcohol swab prep pads Use to swab area of injection/avi as directed. 100 each 3     Aspirin-Diphenhydramine (GERA-SELTZER PM PO) Take 2 tablets by mouth At Bedtime       blood glucose (NO BRAND SPECIFIED) test strip Use to test blood sugar 4 times daily or as directed. To accompany: Blood Glucose Monitor Brands: per insurance. 100 strip 6     blood glucose calibration (NO BRAND SPECIFIED) solution To  accompany: Blood Glucose Monitor Brands: per insurance. 1 Bottle 3     blood glucose monitoring (NO BRAND SPECIFIED) meter device kit Use to test blood sugar 4 times daily or as directed. Preferred blood glucose meter OR supplies to accompany: Blood Glucose Monitor Brands: per insurance. 1 kit 0     calcium carbonate (TUMS) 500 MG chewable tablet Take 2 chew tab by mouth Patient take 4-8 tabs daily       carvedilol (COREG) 6.25 MG tablet Take 1 tablet (6.25 mg) by mouth 2 times daily (with meals) 180 tablet 1     dapagliflozin (FARXIGA) 10 MG TABS tablet Take one tablet by mouth daily 90 tablet 3     diphenhydrAMINE-acetaminophen (TYLENOL PM)  MG tablet Take 1 tablet by mouth nightly as needed for sleep       docusate sodium (COLACE) 100 MG capsule Take 100 mg by mouth 2 times daily as needed for constipation       glipiZIDE (GLUCOTROL XL) 10 MG 24 hr tablet Take 1 tablet (10 mg) by mouth 2 times daily 180 tablet 3     levofloxacin (LEVAQUIN) 750 MG tablet Take 1 tablet (750 mg) by mouth daily for 10 days 10 tablet 0     linaclotide (LINZESS) 290 MCG capsule Take 1 capsule (290 mcg) by mouth every morning (before breakfast) 90 capsule 3     LINZESS 290 MCG capsule TAKE 1 CAPSULE BY MOUTH ONCE DAILY BEFORE BREAKFAST 90 capsule 0     melatonin 3 MG tablet Take 3 mg by mouth nightly as needed for sleep       metFORMIN (GLUCOPHAGE) 500 MG tablet 2 tablets with breakfast and 2 tablet with supper 360 tablet 3     pantoprazole (PROTONIX) 40 MG EC tablet Take 1 tablet (40 mg) by mouth 2 times daily 180 tablet 3     polyethylene glycol (MIRALAX/GLYCOLAX) packet Take 17 g by mouth daily       rosuvastatin (CRESTOR) 5 MG tablet Take 1 tablet (5 mg) by mouth daily 90 tablet 0     sulfamethoxazole-trimethoprim (BACTRIM DS) 800-160 MG tablet Take 1 tablet by mouth 2 times daily for 10 days 20 tablet 0     telmisartan (MICARDIS) 40 MG tablet Take 1 tablet (40 mg) by mouth daily 90 tablet 1     thin (NO BRAND SPECIFIED)  "lancets Use with lanceting device. To accompany: Blood Glucose Monitor Brands: per insurance. 100 each 6     topiramate (TOPAMAX) 100 MG tablet Take 1 tablet (100 mg) by mouth daily 90 tablet 3     Allergies   Allergen Reactions     Ace Inhibitors Itching     Codeine Hives     Lisinopril Nausea and Cough     Reviewed and updated as needed this visit by Provider  Tobacco  Allergies  Meds  Problems  Med Hx  Surg Hx  Fam Hx         Review of Systems   ROS COMP: Constitutional, HEENT, cardiovascular, pulmonary, gi and gu systems are negative, except as otherwise noted.      Objective    There were no vitals taken for this visit.  Estimated body mass index is 28.24 kg/m  as calculated from the following:    Height as of 11/6/19: 1.535 m (5' 0.43\").    Weight as of 11/6/19: 66.5 kg (146 lb 11.2 oz).  Physical Exam     GENERAL: healthy, alert and no distress  EYES: Eyes grossly normal to inspection, conjunctivae and sclerae normal  RESP: no audible wheeze, cough, or visible cyanosis.  No visible retractions or increased work of breathing.  Able to speak fully in complete sentences.  NEURO: Cranial nerves grossly intact, mentation intact and speech normal  PSYCH: mentation appears normal, affect normal/bright, judgement and insight intact, normal speech and appearance well-groomed  Derm: 3 small ulcerations located on the right anterior and lateral ankle. No significant surrounding erythema noted.  MSK: No significant pain out of proportion with inversion, eversion, plantar or dorsal flexion of the right foot.    Diagnostic Test Results:  none       Assessment & Plan   1. 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 No pain out of proportion to exam with flexion/extension, inversion, eversion of the ankle. No purulent discharge. No noticeable deep tracking. Recommend given swelling, and " ulcerations to treat with antibiotics for 10 days. She will call to update me on Monday. Cover for both normal cellulitis with bactrim and for Pseudomonas with levaquin. Recommend elevation and ice for pain and swelling. If swelling or redness expansion, or new symptoms patient needs to be seen in person immediately.  - sulfamethoxazole-trimethoprim (BACTRIM DS) 800-160 MG tablet; Take 1 tablet by mouth 2 times daily for 10 days  Dispense: 20 tablet; Refill: 0  - levofloxacin (LEVAQUIN) 750 MG tablet; Take 1 tablet (750 mg) by mouth daily for 10 days  Dispense: 10 tablet; Refill: 0    Return in about 1 week (around 5/15/2020), or if symptoms worsen or fail to improve.    Jeyson Reyes MD  [facility]    This chart is completed utilizing dictation software; typos and/or incorrect word substitutions may unintentionally occur.      Video-Visit Details    Type of service:  Video Visit    Video End Time:12:00 PM    Originating Location (pt. Location): Home    Distant Location (provider location):  [facility]     Platform used for Video Visit: [employer]

## 2020-05-08 NOTE — TELEPHONE ENCOUNTER
Reason for call; No change in symptoms since virtual visit - but does have follow up questions for Dr Melissa powell .   Pt Spoke to DR Reyes, Jeyson SCHUSTER MD on virtual visit - today @ 1130 am  About  diabetic wound infection problems &   order 2 antibiotics Bactrim DS and Levaquin for Pt to start today  . New  Dime size wound on R ankle  Started , and has 2  smaller wounds on R ankle 5/3/20  ,having bilateral swelling to knee  R   > left  Started 24 hours .  Currently no change from when spoke to provider at 1130am , and Blood sugars are running 200's today , takes metformin and glipiZIDE (GLUCOTROL XL,  and dapagliflozin (FARXIGA) .     FNA Conferenced to [facility] Staff Charity JUNE, who will contact DR Eloy powell to call back Pt for follow up questions and review Diabetic issues .   Pt has questions on wound care - dressing and care and does Pt need to adjust her RX or go into .

## 2020-05-08 NOTE — TELEPHONE ENCOUNTER
Patient still has additional concerns and would like a call back from the Provider.    Charity Clarke, [facility]

## 2020-06-30 ENCOUNTER — TELEPHONE (OUTPATIENT)
Dept: FAMILY MEDICINE | Facility: CLINIC | Age: 60
End: 2020-06-30

## 2020-06-30 NOTE — TELEPHONE ENCOUNTER
Summary:    Patient is due/failing the following:   LDL, MAMMOGRAM, PAP and PHYSICAL    Action needed:   Patient needs office visit for Physical and PAP. and Patient needs fasting lab only appointment. Scheduled a mammogram     Type of outreach:    Phone, left message for patient to call back.     Questions for provider review:    None                                                                                                                                    Liz Jarrell CMA       Chart routed to Care Team .          Panel Management Review      Patient has the following on her problem list:     Diabetes    ASA: Passed    Last A1C  Lab Results   Component Value Date    A1C 8.4 07/22/2019    A1C 8.5 03/18/2019    A1C 7.6 11/12/2018    A1C 8.0 06/11/2018    A1C 7.6 01/22/2018     A1C tested: FAILED    Last LDL:    Lab Results   Component Value Date    CHOL 236 11/12/2018     Lab Results   Component Value Date    HDL 48 11/12/2018     Lab Results   Component Value Date     11/12/2018     Lab Results   Component Value Date    TRIG 240 11/12/2018     No results found for: CHOLHDLRATIO  Lab Results   Component Value Date    NHDL 188 11/12/2018       Is the patient on a Statin? YES             Is the patient on Aspirin? NO    Medications     HMG CoA Reductase Inhibitors     rosuvastatin (CRESTOR) 5 MG tablet             Last three blood pressure readings:  BP Readings from Last 3 Encounters:   11/06/19 124/88   09/18/19 116/84   07/22/19 99/68          Tobacco History:     History   Smoking Status     Never Smoker   Smokeless Tobacco     Never Used         Hypertension   Last three blood pressure readings:  BP Readings from Last 3 Encounters:   11/06/19 124/88   09/18/19 116/84   07/22/19 99/68     Blood pressure: Passed    HTN Guidelines:  Less than 140/90      Composite cancer screening  Chart review shows that this patient is due/due soon for the following Pap Smear and Mammogram

## 2020-06-30 NOTE — TELEPHONE ENCOUNTER
Patient is now living in [facility] so will be transferring her care closer to home.

## 2020-07-02 ENCOUNTER — OFFICE VISIT (OUTPATIENT)
Dept: FAMILY MEDICINE | Facility: CLINIC | Age: 60
End: 2020-07-02
Payer: COMMERCIAL

## 2020-07-02 VITALS
WEIGHT: 151 LBS | SYSTOLIC BLOOD PRESSURE: 117 MMHG | OXYGEN SATURATION: 96 % | BODY MASS INDEX: 29.64 KG/M2 | TEMPERATURE: 98.9 F | HEIGHT: 60 IN | DIASTOLIC BLOOD PRESSURE: 81 MMHG | HEART RATE: 94 BPM

## 2020-07-02 DIAGNOSIS — E78.5 HYPERLIPIDEMIA LDL GOAL <100: ICD-10-CM

## 2020-07-02 DIAGNOSIS — E11.3299 TYPE 2 DIABETES MELLITUS WITH MILD NONPROLIFERATIVE RETINOPATHY WITHOUT MACULAR EDEMA, WITHOUT LONG-TERM CURRENT USE OF INSULIN, UNSPECIFIED LATERALITY (H): Primary | ICD-10-CM

## 2020-07-02 DIAGNOSIS — M79.89 LEG SWELLING: ICD-10-CM

## 2020-07-02 DIAGNOSIS — Z12.31 ENCOUNTER FOR SCREENING MAMMOGRAM FOR BREAST CANCER: ICD-10-CM

## 2020-07-02 DIAGNOSIS — K21.9 GASTROESOPHAGEAL REFLUX DISEASE WITHOUT ESOPHAGITIS: ICD-10-CM

## 2020-07-02 DIAGNOSIS — Z59.9 FINANCIAL DIFFICULTIES: ICD-10-CM

## 2020-07-02 DIAGNOSIS — K59.04 CHRONIC IDIOPATHIC CONSTIPATION: ICD-10-CM

## 2020-07-02 DIAGNOSIS — I10 ESSENTIAL HYPERTENSION, BENIGN: ICD-10-CM

## 2020-07-02 LAB
ALBUMIN SERPL-MCNC: 3.6 G/DL (ref 3.4–5)
ALP SERPL-CCNC: 110 U/L (ref 40–150)
ALT SERPL W P-5'-P-CCNC: 25 U/L (ref 0–50)
ANION GAP SERPL CALCULATED.3IONS-SCNC: 7 MMOL/L (ref 3–14)
AST SERPL W P-5'-P-CCNC: 14 U/L (ref 0–45)
BILIRUB SERPL-MCNC: 0.7 MG/DL (ref 0.2–1.3)
BUN SERPL-MCNC: 16 MG/DL (ref 7–30)
CALCIUM SERPL-MCNC: 9 MG/DL (ref 8.5–10.1)
CHLORIDE SERPL-SCNC: 105 MMOL/L (ref 94–109)
CHOLEST SERPL-MCNC: 257 MG/DL
CO2 SERPL-SCNC: 26 MMOL/L (ref 20–32)
CREAT SERPL-MCNC: 1.15 MG/DL (ref 0.52–1.04)
GFR SERPL CREATININE-BSD FRML MDRD: 52 ML/MIN/{1.73_M2}
GLUCOSE SERPL-MCNC: 309 MG/DL (ref 70–99)
HBA1C MFR BLD: 11.4 % (ref 0–5.6)
HDLC SERPL-MCNC: 41 MG/DL
LDLC SERPL CALC-MCNC: ABNORMAL MG/DL
LDLC SERPL DIRECT ASSAY-MCNC: 158 MG/DL
NONHDLC SERPL-MCNC: 216 MG/DL
POTASSIUM SERPL-SCNC: 4.1 MMOL/L (ref 3.4–5.3)
PROT SERPL-MCNC: 6.9 G/DL (ref 6.8–8.8)
SODIUM SERPL-SCNC: 138 MMOL/L (ref 133–144)
TRIGL SERPL-MCNC: 497 MG/DL

## 2020-07-02 PROCEDURE — 99215 OFFICE O/P EST HI 40 MIN: CPT | Performed by: NURSE PRACTITIONER

## 2020-07-02 PROCEDURE — 82043 UR ALBUMIN QUANTITATIVE: CPT | Performed by: NURSE PRACTITIONER

## 2020-07-02 PROCEDURE — 36415 COLL VENOUS BLD VENIPUNCTURE: CPT | Performed by: NURSE PRACTITIONER

## 2020-07-02 PROCEDURE — 80053 COMPREHEN METABOLIC PANEL: CPT | Performed by: NURSE PRACTITIONER

## 2020-07-02 PROCEDURE — 83036 HEMOGLOBIN GLYCOSYLATED A1C: CPT | Performed by: NURSE PRACTITIONER

## 2020-07-02 PROCEDURE — 83721 ASSAY OF BLOOD LIPOPROTEIN: CPT | Mod: 59 | Performed by: NURSE PRACTITIONER

## 2020-07-02 PROCEDURE — 80061 LIPID PANEL: CPT | Performed by: NURSE PRACTITIONER

## 2020-07-02 RX ORDER — DAPAGLIFLOZIN 10 MG/1
TABLET, FILM COATED ORAL
Qty: 90 TABLET | Refills: 0 | Status: SHIPPED | OUTPATIENT
Start: 2020-07-02 | End: 2020-11-05

## 2020-07-02 RX ORDER — GLIPIZIDE 10 MG/1
10 TABLET, FILM COATED, EXTENDED RELEASE ORAL 2 TIMES DAILY
Qty: 180 TABLET | Refills: 0 | Status: SHIPPED | OUTPATIENT
Start: 2020-07-02 | End: 2020-11-05

## 2020-07-02 RX ORDER — PANTOPRAZOLE SODIUM 20 MG/1
40 TABLET, DELAYED RELEASE ORAL 2 TIMES DAILY
Qty: 180 TABLET | Refills: 0 | Status: SHIPPED | OUTPATIENT
Start: 2020-07-02 | End: 2020-07-07

## 2020-07-02 RX ORDER — TELMISARTAN 40 MG/1
40 TABLET ORAL DAILY
Qty: 90 TABLET | Refills: 0 | Status: SHIPPED | OUTPATIENT
Start: 2020-07-02 | End: 2020-11-05

## 2020-07-02 RX ORDER — CARVEDILOL 6.25 MG/1
6.25 TABLET ORAL 2 TIMES DAILY WITH MEALS
Qty: 180 TABLET | Refills: 0 | Status: SHIPPED | OUTPATIENT
Start: 2020-07-02 | End: 2020-11-05

## 2020-07-02 RX ORDER — MELATONIN 10 MG
CAPSULE ORAL DAILY
Status: ON HOLD | COMMUNITY
End: 2021-03-21

## 2020-07-02 RX ORDER — ROSUVASTATIN CALCIUM 20 MG/1
20 TABLET, COATED ORAL DAILY
Qty: 90 TABLET | Refills: 0 | Status: SHIPPED | OUTPATIENT
Start: 2020-07-02 | End: 2020-11-05

## 2020-07-02 RX ORDER — FUROSEMIDE 20 MG
TABLET ORAL
Qty: 6 TABLET | Refills: 0 | Status: SHIPPED | OUTPATIENT
Start: 2020-07-02 | End: 2021-01-12

## 2020-07-02 RX ORDER — TOPIRAMATE 100 MG/1
100 TABLET, FILM COATED ORAL DAILY
Qty: 90 TABLET | Refills: 0 | Status: SHIPPED | OUTPATIENT
Start: 2020-07-02 | End: 2020-09-17

## 2020-07-02 RX ORDER — ROSUVASTATIN CALCIUM 5 MG/1
5 TABLET, COATED ORAL DAILY
Qty: 90 TABLET | Refills: 0 | Status: SHIPPED | OUTPATIENT
Start: 2020-07-02 | End: 2020-07-02

## 2020-07-02 RX ORDER — SENNOSIDES A AND B 8.6 MG/1
1 TABLET, FILM COATED ORAL PRN
COMMUNITY

## 2020-07-02 SDOH — ECONOMIC STABILITY - INCOME SECURITY: PROBLEM RELATED TO HOUSING AND ECONOMIC CIRCUMSTANCES, UNSPECIFIED: Z59.9

## 2020-07-02 ASSESSMENT — ENCOUNTER SYMPTOMS
CONSTIPATION: 0
ABDOMINAL PAIN: 0
VOMITING: 0
COUGH: 0
DYSPHORIC MOOD: 0
RHINORRHEA: 0
FATIGUE: 0
CHEST TIGHTNESS: 0
HEADACHES: 0
NERVOUS/ANXIOUS: 0
MYALGIAS: 0
PALPITATIONS: 0
ABDOMINAL DISTENTION: 0
DIARRHEA: 0
SHORTNESS OF BREATH: 0
ARTHRALGIAS: 0
SORE THROAT: 0
WHEEZING: 0
LIGHT-HEADEDNESS: 0
NAUSEA: 0
DIZZINESS: 0
NUMBNESS: 0
SLEEP DISTURBANCE: 0

## 2020-07-02 ASSESSMENT — MIFFLIN-ST. JEOR: SCORE: 1185.4

## 2020-07-02 NOTE — LETTER
July 6, 2020      Lorrie Blake  [address] [facility] [address]        Lorrie,     Your electrolytes are normal range.   Your kidney function is just a bit elevated.  Please make sure you are drinking plenty of water   Your blood sugar is over 300.  Your hemoglobin A1c-3-month average of your blood sugar has jumped up to 11.4 from 8.4.  It is imperative that you take your medications as prescribed for your diabetes.  If you are unable to afford them please speak with your pharmacist and also let me know.  As discussed in clinic, 1 of our social workers will be getting in touch with you to help with the cost of your medications as well.  Should plan to recheck your hemoglobin A1c in 3 months.   Your liver function is normal.     Your total cholesterol, bad cholesterol and triglycerides are very elevated.  I would recommend increasing your dose of Crestor to 20 mg orally daily.  I will send a new prescription to the pharmacy for you.  Should plan to recheck your fasting lipids again in 3 months.  Please try to be as active as you can.  Please try and tighten up your diet and decrease your carbohydrate intake.     Resulted Orders   Hemoglobin A1c   Result Value Ref Range    Hemoglobin A1C 11.4 (H) 0 - 5.6 %      Comment:      Results confirmed by repeat test  Normal <5.7% Prediabetes 5.7-6.4%  Diabetes 6.5% or higher - adopted from ADA   consensus guidelines.     Lipid panel reflex to direct LDL Non-fasting   Result Value Ref Range    Cholesterol 257 (H) <200 mg/dL      Comment:      Desirable:       <200 mg/dl    Triglycerides 497 (H) <150 mg/dL      Comment:      Borderline high:  150-199 mg/dl  High:             200-499 mg/dl  Very high:       >499 mg/dl      HDL Cholesterol 41 (L) >49 mg/dL    LDL Cholesterol Calculated  <100 mg/dL     Cannot estimate LDL when triglyceride exceeds 400 mg/dL    Non HDL Cholesterol 216 (H) <130 mg/dL      Comment:      Above Desirable:  130-159 mg/dl  Borderline high:   160-189 mg/dl  High:             190-219 mg/dl  Very high:       >219 mg/dl     Comprehensive metabolic panel   Result Value Ref Range    Sodium 138 133 - 144 mmol/L    Potassium 4.1 3.4 - 5.3 mmol/L    Chloride 105 94 - 109 mmol/L    Carbon Dioxide 26 20 - 32 mmol/L    Anion Gap 7 3 - 14 mmol/L    Glucose 309 (H) 70 - 99 mg/dL    Urea Nitrogen 16 7 - 30 mg/dL    Creatinine 1.15 (H) 0.52 - 1.04 mg/dL    GFR Estimate 52 (L) >60 mL/min/[1.73_m2]      Comment:      Non  GFR Calc  Starting 12/18/2018, serum creatinine based estimated GFR (eGFR) will be   calculated using the Chronic Kidney Disease Epidemiology Collaboration   (CKD-EPI) equation.      GFR Estimate If Black 60 (L) >60 mL/min/[1.73_m2]      Comment:       GFR Calc  Starting 12/18/2018, serum creatinine based estimated GFR (eGFR) will be   calculated using the Chronic Kidney Disease Epidemiology Collaboration   (CKD-EPI) equation.      Calcium 9.0 8.5 - 10.1 mg/dL    Bilirubin Total 0.7 0.2 - 1.3 mg/dL    Albumin 3.6 3.4 - 5.0 g/dL    Protein Total 6.9 6.8 - 8.8 g/dL    Alkaline Phosphatase 110 40 - 150 U/L    ALT 25 0 - 50 U/L    AST 14 0 - 45 U/L   LDL cholesterol direct   Result Value Ref Range    LDL Cholesterol Direct 158 (H) <100 mg/dL      Comment:      Above desirable:  100-129 mg/dl  Borderline High:  130-159 mg/dL  High:             160-189 mg/dL  Very high:       >189 mg/dl         If you have any questions or concerns, please call the clinic at the number listed above.       Sincerely,        NOLVIA Lazaro CNP

## 2020-07-02 NOTE — PROGRESS NOTES
"Subjective     Lorrie Blake is a 59 year old female who presents to clinic today for the following health issues:    HPI     New patient to you, seen previously within [place].   Has not done a Mammogram for many years, had a horrible experience.  In the middle of a bad divorce, more back to parents house. Helping to take care of parents.   Not working, won't have insurance after divorce.   Going back to school for , almost done.   Recently gain 20lbs after moving into parents house.     Legs are swollen for about 2 months, painful in legs and thighs. Has been getting wounds on legs, Video visit last month and given levofloxacin and bactrim DS. Swelling increases at night.     Diabetes Follow-up  How often are you checking your blood sugar? A few times a month, 266, 275, 257, 216, 285, 232  What time of day are you checking your blood sugars (select all that apply)?  Before meals  Have you had any blood sugars above 200?  Yes   Have you had any blood sugars below 70?  No    What symptoms do you notice when your blood sugar is low?  None    What concerns do you have today about your diabetes? Getting infections often and Blood sugar is often over 200     Do you have any of these symptoms? (Select all that apply)  Redness, sores, or blisters on feet feels like she gets \"scott\" in her legs when walking around.     Have you had a diabetic eye exam in the last 12 months? No    Hyperlipidemia Follow-Up    Are you regularly taking any medication or supplement to lower your cholesterol?   Yes- rosuvastatin    Are you having muscle aches or other side effects that you think could be caused by your cholesterol lowering medication?  Yes- possibly  franklin horses at night. Has been doing stretches that seem to help.       BP Readings from Last 2 Encounters:   07/02/20 117/81   11/06/19 124/88     Hemoglobin A1C (%)   Date Value   07/02/2020 11.4 (H)   07/22/2019 8.4 (A)     LDL Cholesterol Calculated " (mg/dL)   Date Value   07/02/2020     Cannot estimate LDL when triglyceride exceeds 400 mg/dL   11/12/2018 140 (H)     LDL Cholesterol Direct (mg/dL)   Date Value   07/02/2020 158 (H)         How many servings of fruits and vegetables do you eat daily?  4 or more    On average, how many sweetened beverages do you drink each day (Examples: soda, juice, sweet tea, etc.  Do NOT count diet or artificially sweetened beverages)?   0    How many days per week do you exercise enough to make your heart beat faster? 3 or less    How many minutes a day do you exercise enough to make your heart beat faster? 9 or less  How many days per week do you miss taking your medication? 1     What makes it hard for you to take your medications?  cost of medication        Current Outpatient Medications   Medication Sig Dispense Refill     alcohol swab prep pads Use to swab area of injection/avi as directed. 100 each 3     blood glucose (NO BRAND SPECIFIED) test strip Use to test blood sugar 4 times daily or as directed. To accompany: Blood Glucose Monitor Brands: per insurance. 100 strip 6     blood glucose calibration (NO BRAND SPECIFIED) solution To accompany: Blood Glucose Monitor Brands: per insurance. 1 Bottle 3     blood glucose monitoring (NO BRAND SPECIFIED) meter device kit Use to test blood sugar 4 times daily or as directed. Preferred blood glucose meter OR supplies to accompany: Blood Glucose Monitor Brands: per insurance. 1 kit 0     carvedilol (COREG) 6.25 MG tablet Take 1 tablet (6.25 mg) by mouth 2 times daily (with meals) 180 tablet 0     Chlorpheniramine Maleate (ALLERGY PO) Take by mouth as needed       Cyanocobalamin (VITAMIN B-12 PO) Take by mouth daily       dapagliflozin (FARXIGA) 10 MG TABS tablet Take one tablet by mouth daily 90 tablet 0     diphenhydrAMINE-acetaminophen (TYLENOL PM)  MG tablet Take 1 tablet by mouth nightly as needed for sleep       docusate sodium (COLACE) 100 MG capsule Take 100 mg by  mouth 2 times daily as needed for constipation       furosemide (LASIX) 20 MG tablet Take 1 tab every other day for a total of 6 doses 6 tablet 0     glipiZIDE (GLUCOTROL XL) 10 MG 24 hr tablet Take 1 tablet (10 mg) by mouth 2 times daily 180 tablet 0     linaclotide (LINZESS) 290 MCG capsule Take 1 capsule (290 mcg) by mouth every morning (before breakfast) 90 capsule 0     Melatonin 10 MG CAPS Take by mouth daily       metFORMIN (GLUCOPHAGE) 500 MG tablet 2 tablets with breakfast and 2 tablet with supper 360 tablet 0     pantoprazole (PROTONIX) 20 MG EC tablet Take 2 tablets (40 mg) by mouth 2 times daily 180 tablet 0     rosuvastatin (CRESTOR) 5 MG tablet Take 1 tablet (5 mg) by mouth daily 90 tablet 0     senna (SENOKOT) 8.6 MG tablet Take 1 tablet by mouth as needed for constipation       telmisartan (MICARDIS) 40 MG tablet Take 1 tablet (40 mg) by mouth daily 90 tablet 0     thin (NO BRAND SPECIFIED) lancets Use with lanceting device. To accompany: Blood Glucose Monitor Brands: per insurance. 100 each 6     topiramate (TOPAMAX) 100 MG tablet Take 1 tablet (100 mg) by mouth daily 90 tablet 0     Allergies   Allergen Reactions     Ace Inhibitors Itching     Codeine Hives     Lisinopril Nausea and Cough       Reviewed and updated as needed this visit by Provider          Today to establish care.  Currently is under a lot of stress.  Is going through a divorce.  Is currently living with elderly parents.  Is helping to care for them.  House is approximately 100 years old and does not have central air conditioning.  They do have a window unit that cools the kitchen and the dining room.  Soon-to-be ex- carries insurance policy.  As soon as divorce is final will no longer have insurance.  Has a meeting with the  coming up.  Has not looked into getting on insurance.  Recently, finished school.  Attended for .  Is hoping to find own job but unsure who would care for parents.  Is  struggling financially.  Soon-to-be ex- is going to be filing bankruptcy.  Reports all of the debt they occur will come to her.  Unsure how she will ever leg pain that.  Is occasionally checking blood sugars.  Frequently over 200.  Medications that are ordered twice per day has only been taking once per day due to not being able to afford them.  Reports has never talked to pharmacist or previous healthcare provider regarding financial difficulties.    Has been sometime since had a physical.  Knows is due for 1 but would like to wait until has new insurance set up.    Has gained approximately 20 pounds since moving in with parents.  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  Felt a lot better when was working out.  Has previously had prescription for phentermine.  Currently, takes Topamax to help control weight and also to help relax at night.    When has more stress blood sugars will be higher.  Blood sugars frequently over 200.  Denies any chest pain or palpitations.  No shortness of breath.  Denies numbness or tingling.  Unsure when the last time was that had eye exam.  Does try to wear something on feet at all times.  Does check feet for sores and currently does not have any.  Has been having more swelling to lower legs.  The swelling is there all of the time.  Wakes up in the morning with swelling.  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    Struggles with constipation.  Has been on Linzess for some time and that has helped.  Does occasionally need to take over-the-counter generic Senokot.  Usually takes it if bowels have not moved in 2 days.    Has been taking Protonix 40 mg twice daily for some time.  Not having any reflux or heartburn.      Review of Systems   Constitutional: Negative for fatigue.   HENT: Negative for ear pain, rhinorrhea and sore throat.    Eyes: Negative for visual disturbance.   Respiratory:  "Negative for cough, chest tightness, shortness of breath and wheezing.    Cardiovascular: Positive for leg swelling. Negative for chest pain and palpitations.   Gastrointestinal: Negative for abdominal distention, abdominal pain, constipation, diarrhea, nausea and vomiting.   Endocrine: Negative for cold intolerance and heat intolerance.   Musculoskeletal: Negative for arthralgias and myalgias.   Skin: Negative for rash.   Neurological: Negative for dizziness, light-headedness, numbness and headaches.   Psychiatric/Behavioral: Negative for dysphoric mood and sleep disturbance. The patient is not nervous/anxious.          Objective    /81   Pulse 94   Temp 98.9  F (37.2  C) (Tympanic)   Ht 1.53 m (5' 0.25\")   Wt 68.5 kg (151 lb)   SpO2 96%   BMI 29.25 kg/m    Body mass index is 29.25 kg/m .  Physical Exam  Constitutional:       Appearance: Normal appearance. She is well-developed.   HENT:      Head: Normocephalic and atraumatic.      Right Ear: Tympanic membrane and external ear normal. No middle ear effusion.      Left Ear: Tympanic membrane and external ear normal.  No middle ear effusion.      Nose: No mucosal edema.   Neck:      Thyroid: No thyromegaly.      Vascular: No carotid bruit.   Cardiovascular:      Rate and Rhythm: Normal rate and regular rhythm.      Heart sounds: Normal heart sounds.   Pulmonary:      Effort: Pulmonary effort is normal.      Breath sounds: Normal breath sounds.   Abdominal:      General: Bowel sounds are normal.      Palpations: Abdomen is soft.   Musculoskeletal:      Right lower leg: Edema (1+) present.      Left lower leg: Edema (1+) present.   Skin:     General: Skin is warm and dry.   Neurological:      Mental Status: She is alert.   Psychiatric:         Behavior: Behavior normal.              Assessment & Plan     1. Type 2 diabetes mellitus with mild nonproliferative retinopathy without macular edema, without long-term current use of insulin, unspecified laterality " (H)  Reinforced importance of taking medications as ordered.  Encouraged to notify if is unable to afford medications.  Encouraged to speak with pharmacist regarding medication affordability.  Referral placed for care coordination to help with medication expense.  We will check labs here today.  Plan to follow-up in 3 months or sooner if needed.  - Hemoglobin A1c  - Albumin Random Urine Quantitative with Creat Ratio  - Lipid panel reflex to direct LDL Non-fasting  - Comprehensive metabolic panel  - metFORMIN (GLUCOPHAGE) 500 MG tablet; 2 tablets with breakfast and 2 tablet with supper  Dispense: 360 tablet; Refill: 0  - glipiZIDE (GLUCOTROL XL) 10 MG 24 hr tablet; Take 1 tablet (10 mg) by mouth 2 times daily  Dispense: 180 tablet; Refill: 0  - dapagliflozin (FARXIGA) 10 MG TABS tablet; Take one tablet by mouth daily  Dispense: 90 tablet; Refill: 0  - topiramate (TOPAMAX) 100 MG tablet; Take 1 tablet (100 mg) by mouth daily  Dispense: 90 tablet; Refill: 0  - LDL cholesterol direct    2. Essential hypertension, benign  Blood pressure currently well controlled in clinic.  Reinforced importance of taking medications as ordered to prevent long-term complications.  We will plan to follow-up in 3 months or sooner if needed  - carvedilol (COREG) 6.25 MG tablet; Take 1 tablet (6.25 mg) by mouth 2 times daily (with meals)  Dispense: 180 tablet; Refill: 0  - telmisartan (MICARDIS) 40 MG tablet; Take 1 tablet (40 mg) by mouth daily  Dispense: 90 tablet; Refill: 0    3. Chronic idiopathic constipation  Doing well on current regimen.  Refills given  - linaclotide (LINZESS) 290 MCG capsule; Take 1 capsule (290 mcg) by mouth every morning (before breakfast)  Dispense: 90 capsule; Refill: 0    4. Gastroesophageal reflux disease without esophagitis  We will plan to decrease dose of Protonix from 40 mg twice daily to 20 mg p.o. twice daily  May use over-the-counter Tums as needed.  Ideally, would like to change over to Pepcid in the  future.  - pantoprazole (PROTONIX) 20 MG EC tablet; Take 2 tablets (40 mg) by mouth 2 times daily  Dispense: 180 tablet; Refill: 0    5. Hyperlipidemia LDL goal <100  We will recheck labs here today.  Is not fasting today.  - rosuvastatin (CRESTOR) 5 MG tablet; Take 1 tablet (5 mg) by mouth daily  Dispense: 90 tablet; Refill: 0    6. Leg swelling  Encouraged to monitor salt intake.  Encouraged to keep legs elevated during the day.  Will give short-term prescription for furosemide.  - furosemide (LASIX) 20 MG tablet; Take 1 tab every other day for a total of 6 doses  Dispense: 6 tablet; Refill: 0    7. Encounter for screening mammogram for breast cancer  Order placed, plans to call per self and set up  - *MA Screening Digital Bilateral; Future    8. Financial difficulties  Will refer to care coordination for assistance with medication cost and obtaining on insurance.  - CARE COORDINATION REFERRAL     During this visit greater than 80% of the 48 minutes for this appointment was spent in counseling and/or coordinating care of above stated issues.     No follow-ups on file.    NOLVIA Lazaro [facility] SHANT

## 2020-07-02 NOTE — LETTER
July 9, 2020      Lorrie J Blake  [address] [facility] [address]        Dear ,    We are writing to inform you of your test results.    Your electrolytes are normal range. Your kidney function is just a bit elevated.  Please make sure you are drinking plenty of water. Your blood sugar is over 300.  Your hemoglobin A1c-3-month average of your blood sugar has jumped up to 11.4 from 8.4.  It is imperative that you take your medications as prescribed for your diabetes.  If you are unable to afford them please speak with your pharmacist and also let me know.  As discussed in clinic, 1 of our social workers will be getting in touch with you to help with the cost of your medications as well.  Should plan to recheck your hemoglobin A1c in 3 months. Your liver function is normal.     Your total cholesterol, bad cholesterol and triglycerides are very elevated.  I would recommend increasing your dose of Crestor to 20 mg orally daily.  I will send a new prescription to the pharmacy for you.  Should plan to recheck your fasting lipids again in 3 months.  Please try to be as active as you can.  Please try and tighten up your diet and decrease your carbohydrate intake.     Resulted Orders   Hemoglobin A1c   Result Value Ref Range    Hemoglobin A1C 11.4 (H) 0 - 5.6 %      Comment:      Results confirmed by repeat test  Normal <5.7% Prediabetes 5.7-6.4%  Diabetes 6.5% or higher - adopted from ADA   consensus guidelines.     Albumin Random Urine Quantitative with Creat Ratio   Result Value Ref Range    Creatinine Urine 43 mg/dL    Albumin Urine mg/L <5 mg/L    Albumin Urine mg/g Cr Unable to calculate due to low value 0 - 25 mg/g Cr   Lipid panel reflex to direct LDL Non-fasting   Result Value Ref Range    Cholesterol 257 (H) <200 mg/dL      Comment:      Desirable:       <200 mg/dl    Triglycerides 497 (H) <150 mg/dL      Comment:      Borderline high:  150-199 mg/dl  High:             200-499 mg/dl  Very high:        >499 mg/dl      HDL Cholesterol 41 (L) >49 mg/dL    LDL Cholesterol Calculated  <100 mg/dL     Cannot estimate LDL when triglyceride exceeds 400 mg/dL    Non HDL Cholesterol 216 (H) <130 mg/dL      Comment:      Above Desirable:  130-159 mg/dl  Borderline high:  160-189 mg/dl  High:             190-219 mg/dl  Very high:       >219 mg/dl     Comprehensive metabolic panel   Result Value Ref Range    Sodium 138 133 - 144 mmol/L    Potassium 4.1 3.4 - 5.3 mmol/L    Chloride 105 94 - 109 mmol/L    Carbon Dioxide 26 20 - 32 mmol/L    Anion Gap 7 3 - 14 mmol/L    Glucose 309 (H) 70 - 99 mg/dL    Urea Nitrogen 16 7 - 30 mg/dL    Creatinine 1.15 (H) 0.52 - 1.04 mg/dL    GFR Estimate 52 (L) >60 mL/min/[1.73_m2]      Comment:      Non  GFR Calc  Starting 12/18/2018, serum creatinine based estimated GFR (eGFR) will be   calculated using the Chronic Kidney Disease Epidemiology Collaboration   (CKD-EPI) equation.      GFR Estimate If Black 60 (L) >60 mL/min/[1.73_m2]      Comment:       GFR Calc  Starting 12/18/2018, serum creatinine based estimated GFR (eGFR) will be   calculated using the Chronic Kidney Disease Epidemiology Collaboration   (CKD-EPI) equation.      Calcium 9.0 8.5 - 10.1 mg/dL    Bilirubin Total 0.7 0.2 - 1.3 mg/dL    Albumin 3.6 3.4 - 5.0 g/dL    Protein Total 6.9 6.8 - 8.8 g/dL    Alkaline Phosphatase 110 40 - 150 U/L    ALT 25 0 - 50 U/L    AST 14 0 - 45 U/L   LDL cholesterol direct   Result Value Ref Range    LDL Cholesterol Direct 158 (H) <100 mg/dL      Comment:      Above desirable:  100-129 mg/dl  Borderline High:  130-159 mg/dL  High:             160-189 mg/dL  Very high:       >189 mg/dl         If you have any questions or concerns, please call the clinic at the number listed above.       Sincerely,        Mary Kay Pollard, APRN CNP/ aj

## 2020-07-02 NOTE — LETTER
July 6, 2020      Lorrie Blake  [address] [facility] [address]        Lorrie,     You are not spilling any protein into your urine-this is good.       Resulted Orders   Hemoglobin A1c   Result Value Ref Range    Hemoglobin A1C 11.4 (H) 0 - 5.6 %      Comment:      Results confirmed by repeat test  Normal <5.7% Prediabetes 5.7-6.4%  Diabetes 6.5% or higher - adopted from ADA   consensus guidelines.     Albumin Random Urine Quantitative with Creat Ratio   Result Value Ref Range    Creatinine Urine 43 mg/dL    Albumin Urine mg/L <5 mg/L    Albumin Urine mg/g Cr Unable to calculate due to low value 0 - 25 mg/g Cr   Lipid panel reflex to direct LDL Non-fasting   Result Value Ref Range    Cholesterol 257 (H) <200 mg/dL      Comment:      Desirable:       <200 mg/dl    Triglycerides 497 (H) <150 mg/dL      Comment:      Borderline high:  150-199 mg/dl  High:             200-499 mg/dl  Very high:       >499 mg/dl      HDL Cholesterol 41 (L) >49 mg/dL    LDL Cholesterol Calculated  <100 mg/dL     Cannot estimate LDL when triglyceride exceeds 400 mg/dL    Non HDL Cholesterol 216 (H) <130 mg/dL      Comment:      Above Desirable:  130-159 mg/dl  Borderline high:  160-189 mg/dl  High:             190-219 mg/dl  Very high:       >219 mg/dl     Comprehensive metabolic panel   Result Value Ref Range    Sodium 138 133 - 144 mmol/L    Potassium 4.1 3.4 - 5.3 mmol/L    Chloride 105 94 - 109 mmol/L    Carbon Dioxide 26 20 - 32 mmol/L    Anion Gap 7 3 - 14 mmol/L    Glucose 309 (H) 70 - 99 mg/dL    Urea Nitrogen 16 7 - 30 mg/dL    Creatinine 1.15 (H) 0.52 - 1.04 mg/dL    GFR Estimate 52 (L) >60 mL/min/[1.73_m2]      Comment:      Non  GFR Calc  Starting 12/18/2018, serum creatinine based estimated GFR (eGFR) will be   calculated using the Chronic Kidney Disease Epidemiology Collaboration   (CKD-EPI) equation.      GFR Estimate If Black 60 (L) >60 mL/min/[1.73_m2]      Comment:       GFR  Calc  Starting 12/18/2018, serum creatinine based estimated GFR (eGFR) will be   calculated using the Chronic Kidney Disease Epidemiology Collaboration   (CKD-EPI) equation.      Calcium 9.0 8.5 - 10.1 mg/dL    Bilirubin Total 0.7 0.2 - 1.3 mg/dL    Albumin 3.6 3.4 - 5.0 g/dL    Protein Total 6.9 6.8 - 8.8 g/dL    Alkaline Phosphatase 110 40 - 150 U/L    ALT 25 0 - 50 U/L    AST 14 0 - 45 U/L   LDL cholesterol direct   Result Value Ref Range    LDL Cholesterol Direct 158 (H) <100 mg/dL      Comment:      Above desirable:  100-129 mg/dl  Borderline High:  130-159 mg/dL  High:             160-189 mg/dL  Very high:       >189 mg/dl         If you have any questions or concerns, please call the clinic at the number listed above.       Sincerely,        NOLVIA Lazaro CNP

## 2020-07-03 ENCOUNTER — TELEPHONE (OUTPATIENT)
Dept: FAMILY MEDICINE | Facility: CLINIC | Age: 60
End: 2020-07-03

## 2020-07-03 ENCOUNTER — PATIENT OUTREACH (OUTPATIENT)
Dept: CARE COORDINATION | Facility: CLINIC | Age: 60
End: 2020-07-03

## 2020-07-03 LAB
CREAT UR-MCNC: 43 MG/DL
MICROALBUMIN UR-MCNC: <5 MG/L
MICROALBUMIN/CREAT UR: NORMAL MG/G CR (ref 0–25)

## 2020-07-03 NOTE — PROGRESS NOTES
Clinic Care Coordination Contact  [facility]/Voicemail       Clinical Data: CHW Outreach  Outreach attempted x 1. Left message on patient's voicemail with call back information and requested return call.    Plan: CHW will try to reach patient again in 1-2 business days.    Swetha WAKEFIELD Community Health Worker  Clinic Care Coordination  [facility] : Tita Flores & Meme Strickland  Phone: [phone]      Reason for Referral: Complex Medical Concerns/Education: Chronic diagnosis Diabetes and Financial Support: Insurance and Medication Affordability   Additional pertinent details: Is currently going through a divorce and living with her elderly parents.

## 2020-07-03 NOTE — LETTER
[place] CARE COORDINATION  [address] [facility] [address]    July 6, 2020    Lorrie Balke  [address] [address]      Dear Lorrie,    I am a clinic community health worker who works with Tequila Angela MD at [facility]. I have been trying to reach you recently to introduce Clinic Care Coordination and to see if there was anything I could assist you with.  Below is a description of clinic care coordination and how I can further assist you.      The clinic care coordination team is made up of a registered nurse,  and community health worker who understand the health care system. The goal of clinic care coordination is to help you manage your health and improve access to the health care system in the most efficient manner. The team can assist you in meeting your health care goals by providing education, coordinating services, strengthening the communication among your providers and supporting you with any resource needs.    Please feel free to contact the Community Health Worker at [phone] with any questions or concerns. We are focused on providing you with the highest-quality healthcare experience possible and that all starts with you.     Sincerely,     Krysten Lopez  Community Health Worker   [facility]  [address], [place] [address]  [facility]  [address] [place] [address]  [facility]  [address] [place] [address]  [email or web address]

## 2020-07-03 NOTE — TELEPHONE ENCOUNTER
The day of  Her mammogram they will have her sign a IRMA and request the images.     Mary Kay PARRA

## 2020-07-03 NOTE — TELEPHONE ENCOUNTER
Pt tried getting mammo record from [facility] and was told it has to be provider to provider request.    [phone]    Kimber Souza, Station

## 2020-07-06 NOTE — PROGRESS NOTES
Clinic Care Coordination Contact  [facility]/Voicemail       Clinical Data: CHW Outreach  Outreach attempted x 2.  Left message on patient's voicemail with call back information and requested return call.  Plan: CHW will send care coordination introduction letter with CHW contact information and explanation of care coordination services via mail. CHW will do no further outreaches at this time.    Krysten Lopez  Community Health Worker   [facility]  [address], [place] [address]  [facility]  [address] [place] [address]  [facility]  [address], [place] [address]  [email or web address]    Reason for Referral: Complex Medical Concerns/Education: Chronic diagnosis Diabetes and Financial Support: Insurance and Medication Affordability   Additional pertinent details: Is currently going through a divorce and living with her elderly parents.     Notes:  -Received a referral from your provider to schedule you with CC ANUPAM

## 2020-07-13 ENCOUNTER — TELEPHONE (OUTPATIENT)
Dept: FAMILY MEDICINE | Facility: CLINIC | Age: 60
End: 2020-07-13

## 2020-07-13 DIAGNOSIS — Z59.9 FINANCIAL DIFFICULTIES: Primary | ICD-10-CM

## 2020-07-13 SDOH — ECONOMIC STABILITY - INCOME SECURITY: PROBLEM RELATED TO HOUSING AND ECONOMIC CIRCUMSTANCES, UNSPECIFIED: Z59.9

## 2020-07-13 NOTE — TELEPHONE ENCOUNTER
Patient left voicemail on TC phone. Patient states she was given number [phone] to call to speak with someone regarding help in getting her medication as she cant afford it.

## 2020-07-14 ENCOUNTER — ANCILLARY PROCEDURE (OUTPATIENT)
Dept: MAMMOGRAPHY | Facility: CLINIC | Age: 60
End: 2020-07-14
Attending: NURSE PRACTITIONER
Payer: COMMERCIAL

## 2020-07-14 ENCOUNTER — PATIENT OUTREACH (OUTPATIENT)
Dept: CARE COORDINATION | Facility: CLINIC | Age: 60
End: 2020-07-14

## 2020-07-14 DIAGNOSIS — Z12.31 ENCOUNTER FOR SCREENING MAMMOGRAM FOR BREAST CANCER: ICD-10-CM

## 2020-07-14 PROCEDURE — 77067 SCR MAMMO BI INCL CAD: CPT | Mod: TC

## 2020-07-14 NOTE — LETTER
[facility]  [address] [place] [address]      July 14, 2020    Lorrie Blake  [address] [address]      Dear Lorrie,     I am a clinic community health worker who works with Tequila Angela MD at [facility]. I have been trying to reach you recently to introduce Clinic Care Coordination and to see if there is anything we could provide you with.  Below is a description of clinic care coordination and how we can further assist you.      The clinic care coordination team is made up of a registered nurse,  and community health worker who understand the health care system. The goal of clinic care coordination is to help you manage your health and improve access to the health care system in the most efficient manner. The team can assist you in meeting your health care goals by providing education, coordinating services, strengthening the communication among your providers and supporting you with any resource needs.    Please feel free to contact me with any questions or concerns. We are focused on providing you with the highest-quality healthcare experience possible and that all starts with you.     Sincerely,     Carolyne VICTORIA, Community Health Worker  [facility] Clinic Care Coordination  [facility]  Phone: [phone]

## 2020-07-14 NOTE — PROGRESS NOTES
Clinic Care Coordination Contact  [facility]/Voicemail     Clinical Data: CHW Outreach attempted x 1.  Left message on patient's voicemail with call back information and requested return call.  Plan: CHW will send care coordination introduction letter with contact information and explanation of care coordination services via mail. Due to CHW last outreach on 7/3 -7/6 with no response (left messages) and letter sent, CHW will not continue with further outreaches at this time.    Carolyne VICTORIA, Community Health Worker  [facility] Care Coordination  [facility]  Phone: [phone]

## 2020-07-21 ENCOUNTER — TELEPHONE (OUTPATIENT)
Dept: FAMILY MEDICINE | Facility: CLINIC | Age: 60
End: 2020-07-21

## 2020-07-21 NOTE — TELEPHONE ENCOUNTER
FYI~ I have approved Lorrie for up to $500 of the Pharmacy Assistance Fund to be filled at the [facility].    Lorrie and the [facility] have been informed.    Trinidad Garcia  Prescription   Pharmacy Assistance  [phone]

## 2020-09-17 DIAGNOSIS — E11.3299 TYPE 2 DIABETES MELLITUS WITH MILD NONPROLIFERATIVE RETINOPATHY WITHOUT MACULAR EDEMA, WITHOUT LONG-TERM CURRENT USE OF INSULIN, UNSPECIFIED LATERALITY (H): ICD-10-CM

## 2020-09-17 DIAGNOSIS — K21.9 GASTROESOPHAGEAL REFLUX DISEASE WITHOUT ESOPHAGITIS: ICD-10-CM

## 2020-09-17 RX ORDER — PANTOPRAZOLE SODIUM 20 MG/1
TABLET, DELAYED RELEASE ORAL
Qty: 180 TABLET | Refills: 0 | Status: SHIPPED | OUTPATIENT
Start: 2020-09-17 | End: 2020-11-05

## 2020-09-17 RX ORDER — TOPIRAMATE 100 MG/1
TABLET, FILM COATED ORAL
Qty: 90 TABLET | Refills: 0 | Status: SHIPPED | OUTPATIENT
Start: 2020-09-17 | End: 2021-01-12

## 2020-10-21 NOTE — PROGRESS NOTES
Please call and schedule appointment for physical and diabetes follow-up visit.    Jeyson Reyes MD  [facility]

## 2020-10-21 NOTE — PROGRESS NOTES
Spoke with patient she states that her insurance has changed and sees a different provider, she will inform her pharmacy   Closing encounter  Radha Quiros RT (R)

## 2020-11-05 ENCOUNTER — NURSE TRIAGE (OUTPATIENT)
Dept: NURSING | Facility: CLINIC | Age: 60
End: 2020-11-05

## 2020-11-05 DIAGNOSIS — E11.3299 TYPE 2 DIABETES MELLITUS WITH MILD NONPROLIFERATIVE RETINOPATHY WITHOUT MACULAR EDEMA, WITHOUT LONG-TERM CURRENT USE OF INSULIN, UNSPECIFIED LATERALITY (H): ICD-10-CM

## 2020-11-05 DIAGNOSIS — E78.5 HYPERLIPIDEMIA LDL GOAL <100: ICD-10-CM

## 2020-11-05 DIAGNOSIS — K59.04 CHRONIC IDIOPATHIC CONSTIPATION: ICD-10-CM

## 2020-11-05 DIAGNOSIS — K21.9 GASTROESOPHAGEAL REFLUX DISEASE WITHOUT ESOPHAGITIS: ICD-10-CM

## 2020-11-05 DIAGNOSIS — I10 ESSENTIAL HYPERTENSION, BENIGN: ICD-10-CM

## 2020-11-05 RX ORDER — TELMISARTAN 40 MG/1
40 TABLET ORAL DAILY
Qty: 90 TABLET | Refills: 0 | Status: SHIPPED | OUTPATIENT
Start: 2020-11-05 | End: 2021-03-04

## 2020-11-05 RX ORDER — ROSUVASTATIN CALCIUM 20 MG/1
20 TABLET, COATED ORAL DAILY
Qty: 90 TABLET | Refills: 0 | Status: SHIPPED | OUTPATIENT
Start: 2020-11-05 | End: 2021-03-04

## 2020-11-05 RX ORDER — PANTOPRAZOLE SODIUM 20 MG/1
20 TABLET, DELAYED RELEASE ORAL 2 TIMES DAILY
Qty: 180 TABLET | Refills: 0 | Status: SHIPPED | OUTPATIENT
Start: 2020-11-05 | End: 2021-03-04

## 2020-11-05 RX ORDER — GLIPIZIDE 10 MG/1
10 TABLET, FILM COATED, EXTENDED RELEASE ORAL 2 TIMES DAILY
Qty: 180 TABLET | Refills: 0 | Status: SHIPPED | OUTPATIENT
Start: 2020-11-05 | End: 2021-03-04

## 2020-11-05 RX ORDER — CARVEDILOL 6.25 MG/1
6.25 TABLET ORAL 2 TIMES DAILY WITH MEALS
Qty: 180 TABLET | Refills: 0 | Status: SHIPPED | OUTPATIENT
Start: 2020-11-05

## 2020-11-05 RX ORDER — DAPAGLIFLOZIN 10 MG/1
TABLET, FILM COATED ORAL
Qty: 90 TABLET | Refills: 0 | Status: SHIPPED | OUTPATIENT
Start: 2020-11-05 | End: 2021-02-28

## 2020-11-05 NOTE — TELEPHONE ENCOUNTER
Please call patient, due for recheck for further refills, please assist patient in scheduling appt with Drew Mehta refill sent to new pharmacy      Kelly Mix PA-C

## 2020-11-05 NOTE — TELEPHONE ENCOUNTER
Lorrie is calling regarding prescriptions and [facility] closing.    Currently Lorrie  is needing a new prescription for Linzess 290mcg and please send to [facility] in [place] on [address].  Please phone Lorrie when this has been finished.  Lorrie is requesting to have all medication sent to [facility] in [place] on [address].  Lorrie is requesting message be sent to Mary Kay Pollard CNP.    COVID 19 Nurse Triage Plan/Patient Instructions    Please be aware that novel coronavirus (COVID-19) may be circulating in the community. If you develop symptoms such as fever, cough, or SOB or if you have concerns about the presence of another infection including coronavirus (COVID-19), please contact your health care provider or visit [email or web address].     Disposition/Instructions    Home care recommended. Follow home care protocol based instructions.    Thank you for taking steps to prevent the spread of this virus.  o Limit your contact with others.  o Wear a simple mask to cover your cough.  o Wash your hands well and often.    Resources    [facility]: About COVID-19: [email or web address]    CDC: What to Do If You're Sick: www.cdc.gov/coronavirus/2019-ncov/about/steps-when-sick.html    CDC: Ending Home Isolation: www.cdc.gov/coronavirus/2019-ncov/hcp/disposition-in-home-patients.html     CDC: Caring for Someone: www.cdc.gov/coronavirus/2019-ncov/if-you-are-sick/care-for-someone.html     [facility]: Interim Guidance for Hospital Discharge to Home: www.health.[facility].[place]/diseases/coronavirus/hcp/hospdischarge.pdf    [facility] clinical trials (COVID-19 research studies): clinicalaffairs.[facility]-clinical-trials     Below are the COVID-19 hotlines at the [facility] of Health ([facility]). Interpreters are available.   o For health questions: Call [phone] or 1-335.292.2952 (7 a.m. to 7 p.m.)  o For questions about schools and childcare: Call [phone] or 1-674.751.2138 (7 a.m. to 7  "p.m.)                       Reason for Disposition    Caller requesting a NON-URGENT new prescription or refill and triager unable to refill per unit policy    Additional Information    Negative: MORE THAN A DOUBLE DOSE of a prescription or over-the-counter (OTC) drug    Negative: [1] DOUBLE DOSE (an extra dose or lesser amount) of over-the-counter (OTC) drug AND [2] any symptoms (e.g., dizziness, nausea, pain, sleepiness)    Negative: [1] DOUBLE DOSE (an extra dose or lesser amount) of prescription drug AND [2] any symptoms (e.g., dizziness, nausea, pain, sleepiness)    Negative: Took another person's prescription drug    Negative: [1] DOUBLE DOSE (an extra dose or lesser amount) of prescription drug AND [2] NO symptoms (Exception: a double dose of antibiotics)    Negative: Diabetes drug error or overdose (e.g., insulin or extra dose)    Negative: [1] Request for URGENT new prescription or refill of \"essential\" medication (i.e., likelihood of harm to patient if not taken) AND [2] triager unable to fill per unit policy    Negative: [1] Prescription not at pharmacy AND [2] was prescribed today by PCP    Negative: Pharmacy calling with prescription questions and triager unable to answer question    Negative: Caller has urgent medication question about med that PCP prescribed and triager unable to answer question    Negative: Caller has NON-URGENT medication question about med that PCP prescribed and triager unable to answer question    Protocols used: MEDICATION QUESTION CALL-A-AH      "

## 2020-11-07 ENCOUNTER — NURSE TRIAGE (OUTPATIENT)
Dept: NURSING | Facility: CLINIC | Age: 60
End: 2020-11-07

## 2020-11-09 ENCOUNTER — TELEPHONE (OUTPATIENT)
Dept: FAMILY MEDICINE | Facility: CLINIC | Age: 60
End: 2020-11-09

## 2020-11-09 DIAGNOSIS — I10 ESSENTIAL HYPERTENSION, BENIGN: ICD-10-CM

## 2020-11-09 DIAGNOSIS — K58.1 IRRITABLE BOWEL SYNDROME WITH CONSTIPATION: ICD-10-CM

## 2020-11-09 RX ORDER — TELMISARTAN 40 MG/1
40 TABLET ORAL DAILY
Qty: 90 TABLET | Refills: 0 | Status: CANCELLED | OUTPATIENT
Start: 2020-11-09

## 2020-11-09 NOTE — LETTER
2020    INSURER: Payor: BLUE PLUS / Plan: BLUE PLUS ADVANTAGE [place] / Product Type: HMO /   ATTN:   Re: Prior Authorization Request  Patient: Lorrie Blake  Policy ID#:  [id number]  : 1960      To Whom it May Concern:    I am writing to formally request a prior authorization of coverage for my patient,  Lorrie Blake, for treatment using Linzess.  I am requesting authorization for applicable provider professional and facility services associated with this therapy.    The therapy involves Linzess 290 mcg orally daily and daily stool softener.      The benefits of the therapy include regular bowel movements, decreased abdominal pain.      I have treated Lorrie Blake since 2020 and I have determined that it is medically appropriate for  this patient to receive be treated with Linzess 290 mcg orally daily for the reason(s) stated below:      Irritable bowel disease, chronic constipation.  Struggles with abdominal pain      Has tried over-the-counter stool softener-takes this with Linzess daily to promote bowel movement.  Has tried over-the-counter MiraLAX and Metamucil both cause nausea and abdominal cramping.  Is not able to tolerate enemas due to pain and discomfort.      Currently, taking Linzess and is working well without any side effects.  Is having daily bowel movements.      If does not get prescription for Linzess will have decreased quality of life and increased abdominal pain.    I have included medical records pertaining to the patient s medical history, current condition and treatment plan.  In addition, the following billing codes will be used for therapy and follow-up: K 58.9, K 59.04.      I firmly believe that this therapy is clinically appropriate and that Lorrie Blake would benefit from improved quality of life and daily bowel movements if allowed the opportunity to receive this treatment.  Please contact me at Dept: [phone] if you require additional  information to ensure the prompt approval for coverage.    Please send your written decision to me at this address:  [facility]  [address], [address]  [place] [address] [phone]  Dept: [phone]  E-mail:       Sincerely,      Tequila Angela MD        Enclosures

## 2020-11-09 NOTE — TELEPHONE ENCOUNTER
Routing to PA Pool please start a PRIOR AUTHORIZATION    linaclotide (LINZESS) 290 MCG capsule 90 capsule 0 11/5/2020  No   Sig - Route: Take 1 capsule (290 mcg) by mouth every morning (before breakfast)          Thanks  Radha Quiros RT (R)

## 2020-11-09 NOTE — TELEPHONE ENCOUNTER
Prior Authorization Retail Medication Request    Medication/Dose:   ICD code (if different than what is on RX):  Gastroesophageal reflux disease without esophagitis [K21.9]  Previously Tried and Failed:    Rationale:      Insurance Name:  LISA [employer] [place] [employer]  Insurance ID:  [id number]      Pharmacy Information (if different than what is on RX)  Name:  [facility]  Phone:  [phone]

## 2020-11-09 NOTE — TELEPHONE ENCOUNTER
Left msg for pt that she will need an appt prior to running out of med's .     Valeria Santiago, Station

## 2020-11-09 NOTE — TELEPHONE ENCOUNTER
Central Prior Authorization Team   Phone: [phone]      PA Initiation    Medication: LINZESS  Insurance Company: [facility] - Phone [phone] Fax [phone]  Pharmacy Filling the Rx: [address]  Filling Pharmacy Phone: [phone]  Filling Pharmacy Fax:    Start Date: 11/9/2020

## 2020-11-09 NOTE — LETTER
2020    INSURER: Payor: BLUE PLUS / Plan: BLUE PLUS ADVANTAGE [place] / Product Type: HMO /   ATTN:  Re: Prior Authorization Request  Patient: Lorrie Blake  Policy ID#:  [id number]  : 1960      To Whom it May Concern:    I am writing to formally request a prior authorization of coverage for my patient,  Lorrie Blake, for treatment using pantoprazole 20 mg p.o. twice daily.  I am requesting authorization for applicable provider professional and facility services associated with this therapy.    The therapy involves pantoprazole 20 mg p.o. twice daily likely needed for lifetime.      The benefits of the therapy include decreased pain and vomiting due to reflux.      I have treated Lorrie Blake since 2020 and I have determined that it is medically appropriate for  this patient to receive be treated with pantoprazole 20 mg p.o. twice daily for the reason(s) stated below:      Gastroesophageal reflux disease suffers from heartburn, reflux and regurgitation.      Has tried over-the-counter Pepcid and ranitidine which were ineffective.  Has also tried omeprazole which was effective for a brief time and then became ineffective has tried over-the-counter relief and Tums which were ineffective      If Lorrie does not take pantoprazole twice per day, 20 mg she is unable to tolerate oral intake/food and water.    I have included medical records pertaining to the patient s medical history, current condition and treatment plan.  In addition, the following billing codes will be used for therapy and follow-up: K 21.9.      I firmly believe that this therapy is clinically appropriate and that Lorrie Blake would benefit from improved quality of life with decreased pain if allowed the opportunity to receive this treatment.  Please contact me at Dept: [phone] if you require additional information to ensure the prompt approval for coverage.    Please send your written decision to me at this  address:  [facility]  [address]  [place] [address] [phone]  Dept: [phone]  E-mail:       Sincerely,      Tequila Angela MD        Enclosures

## 2020-11-09 NOTE — TELEPHONE ENCOUNTER
Prior Authorization Retail Medication Request    Medication/Dose:   ICD code (if different than what is on RX):  Essential hypertension, benign [I10]   Previously Tried and Failed:    Rationale:      Insurance Name:  [employer] [place]  Insurance ID:  [id number]      Pharmacy Information (if different than what is on RX)  Name:  [facility]  Phone:  [phone]

## 2020-11-10 NOTE — TELEPHONE ENCOUNTER
PRIOR AUTHORIZATION DENIED    Medication: LINZESS    Denial Date: 11/10/2020    Denial Rational:          Appeal Information:    If you would like to appeal, please supply P/A team with a letter of medical necessity with clinical reason.

## 2020-11-10 NOTE — TELEPHONE ENCOUNTER
Central Prior Authorization Team   Phone: [phone]      PA Initiation    Medication: Telmisartan - INITIATED  Insurance Company: [employer] Plus PMA - Phone [phone] Fax [phone]  Pharmacy Filling the Rx: [facility] [address] [place] [address]  Filling Pharmacy Phone: [phone]  Filling Pharmacy Fax: [phone]  Start Date: 11/10/2020

## 2020-11-10 NOTE — TELEPHONE ENCOUNTER
Central Prior Authorization Team   Phone: [phone]      PA Initiation    Medication: Pantoprazole - INITIATED  Insurance Company: Blue Plus [employer]AP - Phone [phone] Fax [phone]  Pharmacy Filling the Rx: [facility] [address] [place] [address]  Filling Pharmacy Phone: [phone]  Filling Pharmacy Fax: [phone]  Start Date: 11/10/2020

## 2020-11-11 NOTE — TELEPHONE ENCOUNTER
Central Prior Authorization Team   Phone: [phone]      Prior Authorization Approval    Authorization Effective Date: 9/1/2020  Authorization Expiration Date: 11/10/2021  Medication: Telmisartan - APPROVED  Approved Dose/Quantity: 90 FOR 90  Reference #:     Insurance Company: Blue Plus PMA - Phone [phone] Fax [phone]  Expected CoPay:       CoPay Card Available:      Foundation Assistance Needed:    Which Pharmacy is filling the prescription (Not needed for infusion/clinic administered): [facility] [address] [place] [address]  Pharmacy Notified: Yes  Patient Notified: Yes (**Instructed pharmacy to notify patient when script is ready to /ship.**)

## 2020-11-11 NOTE — TELEPHONE ENCOUNTER
Central Prior Authorization Team   Phone: [phone]      PRIOR AUTHORIZATION DENIED    Medication: Pantoprazole - DENIED    Denial Date: 11/10/2020    Denial Rational:       Appeal Information:

## 2020-11-12 ENCOUNTER — VIRTUAL VISIT (OUTPATIENT)
Dept: FAMILY MEDICINE | Facility: CLINIC | Age: 60
End: 2020-11-12
Payer: COMMERCIAL

## 2020-11-12 DIAGNOSIS — K59.04 CHRONIC IDIOPATHIC CONSTIPATION: ICD-10-CM

## 2020-11-12 DIAGNOSIS — K21.00 GASTROESOPHAGEAL REFLUX DISEASE WITH ESOPHAGITIS WITHOUT HEMORRHAGE: Primary | ICD-10-CM

## 2020-11-12 PROCEDURE — 99214 OFFICE O/P EST MOD 30 MIN: CPT | Mod: 95 | Performed by: NURSE PRACTITIONER

## 2020-11-12 ASSESSMENT — ENCOUNTER SYMPTOMS
CONSTIPATION: 1
ROS GI COMMENTS: REFLUX

## 2020-11-12 NOTE — PROGRESS NOTES
"Lorrie Blake is a 59 year old female who is being evaluated via a billable telephone visit.      The patient has been notified of following:     \"This telephone visit will be conducted via a call between you and your physician/provider. We have found that certain health care needs can be provided without the need for a physical exam.  This service lets us provide the care you need with a short phone conversation.  If a prescription is necessary we can send it directly to your pharmacy.  If lab work is needed we can place an order for that and you can then stop by our lab to have the test done at a later time.    Telephone visits are billed at different rates depending on your insurance coverage. During this emergency period, for some insurers they may be billed the same as an in-person visit.  Please reach out to your insurance provider with any questions.    If during the course of the call the physician/provider feels a telephone visit is not appropriate, you will not be charged for this service.\"    Patient has given verbal consent for Telephone visit?  Yes    What phone number would you like to be contacted at? [phone]    How would you like to obtain your AVS? Mail a copy    Subjective     Lorrie Blake is a 59 year old female who presents via phone visit today for the following health issues:    HPI     Medication Followup of constipation    Taking Medication as prescribed: yes    Side Effects:  None    Medication Helping Symptoms:  yes     Phone call completed due to COVID-19 outbreak.     Needs a prior authorization form completed in order to get approval for Linzess and pantoprazole.  Has been on Linzess for some time and has been working well to control chronic constipation.  Has used miralax in the past which was ineffective. Takes a stool softener along with the linzess to keep bowels moving. Gets nausea with metamucil. Is not able to tolerate enemas due to them causing nausea and abdomen " cramping.     Has been on multiple medications in the past for reflux.  If does not take Protonix is not able to tolerate food. Will help to decrease reflux. Rolaids and tums does not work. Has tried zantac and famotidine in the past and those are not effective in controlling in reflux. Has used omeprazole in the past and that did work for a brief time, but then not longer worked and will vomit acid.     Review of Systems   Gastrointestinal: Positive for constipation.        Reflux            Objective      Speaking in full sentences.  In no acute distress.    Vitals:  No vitals were obtained today due to virtual visit.          Assessment/Plan:    Assessment & Plan     1. Chronic idiopathic constipation  Constipation well controlled with use of Linzess and stool softeners daily.  Has tried and failed multiple other over-the-counter and prescription products.  Will work with prior authorization team to get Linzess approved.    2. Gastroesophageal reflux disease with esophagitis without hemorrhage  Protonix working well to control reflux.  Has tried and failed multiple over-the-counter and prescription products.  We will work with prior authorization team to get pantoprazole approved.      No follow-ups on file.    [facility]    Phone call duration:  10 minutes

## 2020-11-12 NOTE — TELEPHONE ENCOUNTER
Received a note from Mary Kay Pollard.     If you would like to appeal, please supply P/A team with a letter of medical necessity with clinical reason for each medication and route each individual encounter back to the PA team. Thank you.

## 2020-11-16 NOTE — TELEPHONE ENCOUNTER
Central Prior Authorization Team   Phone: [phone]      Medication Appeal Initiation    We have initiated an appeal for the requested medication:  Medication: Pantoprazole - APPEAL INITIATED  Appeal Start Date:  11/16/2020  Insurance Company: [employer] [facility] - Phone [phone] Fax [phone]  Comments:  Appeal initiated with letter of medical necessity.    Manually faxed to insurance @ [phone].

## 2020-11-16 NOTE — TELEPHONE ENCOUNTER
Medication Appeal Initiation    We have initiated an appeal for the requested medication:  Medication: LINZESS  Appeal Start Date:  11/16/2020  Insurance Company: [employer] [place] - Phone [phone] Fax [phone]  Comments:

## 2020-11-17 NOTE — TELEPHONE ENCOUNTER
Central Prior Authorization Team   Phone: [phone]      MEDICATION APPEAL APPROVED    Medication: Pantoprazole - APPEAL APPROVED  Authorization Effective Date: 09/01/2020    Authorization Expiration Date: 03/17/2021   Approved Dose/Quantity: 180 FOR 90  Reference #:     Insurance Company: [facility] - Phone [phone] Fax [phone]  Expected CoPay:       CoPay Card Available:      Foundation Assistance Needed:    Which Pharmacy is filling the prescription (Not needed for infusion/clinic administered): [facility] [address] [place] [address]    Verbal approval given by [facility] representative.    Pharmacy notified - pharmacy will notify pt when ready for .

## 2020-11-18 ENCOUNTER — TELEPHONE (OUTPATIENT)
Dept: FAMILY MEDICINE | Facility: CLINIC | Age: 60
End: 2020-11-18

## 2020-11-18 NOTE — TELEPHONE ENCOUNTER
Reason for call:  Medication   If this is a refill request, has the caller requested the refill from the pharmacy already? No  Will the patient be using a [facility]? No  Name of the pharmacy and phone number for the current request: NA    Name of the medication requested: NA    Other request: NA    Phone number to reach patient:  Other phone number: NA    Best Time:  NA    Can we leave a detailed message on this number?  NO    Travel screening: Not Applicable    *Call disconnected before any information could be taken down*

## 2020-11-18 NOTE — TELEPHONE ENCOUNTER
Prior Authorization & Appeal both denied for Linzess encounter dated 11/9/2020.    KIMMIE GOTTI      Called pt she is looking for weather a PA was done on her Linzess, which she take for Chronic idiopathic constipation [K59.04]   Has taken for years and works well,    She has not had a normal BM in a week did have small one today   Advised we have not gotten a rejection from [facility] pharmacy  To start a PA asked her to call them if they can not fill to send rejection note to a PA can be started   She will review the PA inplace with her   Call back as need   ELDA Gallo  Clinic  RN/Ronaldo Figueroa

## 2020-11-18 NOTE — TELEPHONE ENCOUNTER
MEDICATION APPEAL DENIED    Medication: LINZESS    Denial Date: 11/18/2020    Denial Rational:  Patient must have a history of trial & failure to the formulary alternative(s) or have a contraindication or intolerance to the formulary alternatives: Trulance        Second Level Appeal Information:      Second level appeals will be managed by the clinic staff and provider. Please contact the [employer] Prior Authorization Team if additional information about the denial is needed.

## 2020-11-19 NOTE — TELEPHONE ENCOUNTER
Mary Kay, I see you started the PA process for this already, so I'll send this along to you. Kelly Mix, PA-C

## 2020-11-20 ENCOUNTER — TELEPHONE (OUTPATIENT)
Dept: FAMILY MEDICINE | Facility: CLINIC | Age: 60
End: 2020-11-20

## 2020-11-20 ENCOUNTER — NURSE TRIAGE (OUTPATIENT)
Dept: NURSING | Facility: CLINIC | Age: 60
End: 2020-11-20

## 2020-11-20 RX ORDER — PLECANATIDE 3 MG/1
3 TABLET ORAL DAILY
Qty: 90 TABLET | Refills: 1 | Status: SHIPPED | OUTPATIENT
Start: 2020-11-20

## 2020-11-20 NOTE — TELEPHONE ENCOUNTER
Please call Lorrie and update her.  Her insurance requires her to try Trulance first, they will not approve the prescription for Linzess.  I have sent a prescription to the [facility] pharmacy for Trulance 3 mg orally once per day.  Would recommend a trial for at least 1 month.  If it is ineffective, have her reach out to us in 1 month.    Mary Kay LANTIGUAC

## 2020-11-20 NOTE — TELEPHONE ENCOUNTER
Patient informed of change in medication, patient advised to call back in 2-4 weeks if medication is ineffective.    Malu Littlejohn RN

## 2020-11-20 NOTE — TELEPHONE ENCOUNTER
"Pt calling back to report she needs a prior authorization for Trulance. Pt sounds upset and states \"they told me this was all taken care of\". Pt would like a call back from clinic as soon as possible today to discuss.     Reason for Disposition    Caller has urgent medication question about med that PCP prescribed and triager unable to answer question    Protocols used: MEDICATION QUESTION CALL-A-AH      "

## 2020-11-20 NOTE — TELEPHONE ENCOUNTER
Routing to PA Pool please start a PRIOR AUTHORIZATION    plecanatide (TRULANCE) 3 MG tablet 3 mg, DAILY 1 ordered               Summary: Take 1 tablet (3 mg) by mouth daily, Disp-90 tablet            Thanks  Radha Quiros RT (R)

## 2020-11-23 NOTE — TELEPHONE ENCOUNTER
Patient notified of recommendations. She will call her insurance company to see if there is anything else they require.  Trinidad Vincent RN

## 2020-11-23 NOTE — TELEPHONE ENCOUNTER
Prior Authorization Approval    Authorization Effective Date: 8/23/2020  Authorization Expiration Date: 11/23/2021  Medication: plecanatide (TRULANCE) 3 MG tablet  Approved Dose/Quantity:    Reference #:     Insurance Company: [employer] [place] - Phone [phone] Fax [phone]  Expected CoPay:       CoPay Card Available:      Foundation Assistance Needed:    Which Pharmacy is filling the prescription (Not needed for infusion/clinic administered): [facility] [address] [place] [address]  Pharmacy Notified: Yes  Patient Notified: Yes  **Instructed pharmacy to notify patient when script is ready to /ship.**

## 2020-11-23 NOTE — TELEPHONE ENCOUNTER
Central Prior Authorization Team   Phone: [phone]      PA Initiation    Medication: plecanatide (TRULANCE) 3 MG tablet  Insurance Company: [employer] [place] - Phone [phone] Fax [phone]  Pharmacy Filling the Rx: [facility] [address] [place] [address]  Filling Pharmacy Phone: [phone]  Filling Pharmacy Fax:    Start Date: 11/23/2020

## 2020-11-23 NOTE — TELEPHONE ENCOUNTER
Please see phone call encounter under Notes/Trans tab. Her insurance is requiring her to try Trulance. I sent that prescription. I am not sure why another PA was triggered, but we are now working on the PA for Trulance, this work is note in one of the phone call notes from 11/20/2020. Would recommend that she reaches out to her insurance company as I have ordered the Trulance as they requested.     Mary Kay LANTIGUAC

## 2020-12-28 ENCOUNTER — NURSE TRIAGE (OUTPATIENT)
Dept: NURSING | Facility: CLINIC | Age: 60
End: 2020-12-28

## 2020-12-28 DIAGNOSIS — I10 ESSENTIAL HYPERTENSION, BENIGN: ICD-10-CM

## 2020-12-28 DIAGNOSIS — E11.3299 TYPE 2 DIABETES MELLITUS WITH MILD NONPROLIFERATIVE RETINOPATHY WITHOUT MACULAR EDEMA, WITHOUT LONG-TERM CURRENT USE OF INSULIN, UNSPECIFIED LATERALITY (H): Primary | ICD-10-CM

## 2020-12-28 DIAGNOSIS — E11.3299 TYPE 2 DIABETES MELLITUS WITH MILD NONPROLIFERATIVE RETINOPATHY WITHOUT MACULAR EDEMA, WITHOUT LONG-TERM CURRENT USE OF INSULIN, UNSPECIFIED LATERALITY (H): ICD-10-CM

## 2020-12-28 RX ORDER — TOPIRAMATE 100 MG/1
100 TABLET, FILM COATED ORAL DAILY
Qty: 90 TABLET | Refills: 0 | Status: SHIPPED | OUTPATIENT
Start: 2020-12-28 | End: 2021-04-12

## 2020-12-28 RX ORDER — CARVEDILOL 6.25 MG/1
6.25 TABLET ORAL 2 TIMES DAILY WITH MEALS
Qty: 180 TABLET | Refills: 0 | Status: SHIPPED | OUTPATIENT
Start: 2020-12-28 | End: 2021-01-12

## 2020-12-28 NOTE — TELEPHONE ENCOUNTER
Chart reviewed.  1 refill of prescriptions provided to preferred pharmacy.  Patient is overdue for follow-up with PCP.  Please call patient to schedule.    Tika Lyon PA-C on 12/28/2020 at 3:59 PM

## 2020-12-28 NOTE — TELEPHONE ENCOUNTER
Lorrie was seen in [place] in July.  Today is calling and states that she is out of blood pressure medication Carvedilol and Topiramate.  Prescription was sent through [facility] and now closed down.  Lorrie is needing new prescriptions for medications.  Please refill and phone Lorrie when this has been completed.    COVID 19 Nurse Triage Plan/Patient Instructions    Please be aware that novel coronavirus (COVID-19) may be circulating in the community. If you develop symptoms such as fever, cough, or SOB or if you have concerns about the presence of another infection including coronavirus (COVID-19), please contact your health care provider or visit [email or web address].     Disposition/Instructions    Home care recommended. Follow home care protocol based instructions.    Thank you for taking steps to prevent the spread of this virus.  o Limit your contact with others.  o Wear a simple mask to cover your cough.  o Wash your hands well and often.    Resources    [facility]: About COVID-19: [email or web address]    CDC: What to Do If You're Sick: www.cdc.gov/coronavirus/2019-ncov/about/steps-when-sick.html    CDC: Ending Home Isolation: www.cdc.gov/coronavirus/2019-ncov/hcp/disposition-in-home-patients.html     CDC: Caring for Someone: www.cdc.gov/coronavirus/2019-ncov/if-you-are-sick/care-for-someone.html     [facility]: Interim Guidance for Hospital Discharge to Home: www.health.[facility].[place].us/diseases/coronavirus/hcp/hospdischarge.pdf    [facility] clinical trials (COVID-19 research studies): [email or web address]-clinical-trials     Below are the COVID-19 hotlines at the [facility] of Health ([facility]). Interpreters are available.   o For health questions: Call [phone] or [phone] (7 a.m. to 7 p.m.)  o For questions about schools and childcare: Call [phone] or [phone] (7 a.m. to 7 p.m.)                     Additional Information    Negative: MORE THAN A DOUBLE DOSE  of a prescription or over-the-counter (OTC) drug    Negative: DOUBLE DOSE (an extra dose or lesser amount) of over-the-counter (OTC) drug and any symptoms (e.g., dizziness, nausea, pain, sleepiness)    Negative: DOUBLE DOSE (an extra dose or lesser amount) of prescription drug and any symptoms (e.g., dizziness, nausea, pain, sleepiness)    Negative: Took another person's prescription drug    Negative: DOUBLE DOSE (an extra dose or lesser amount) of prescription drug and NO symptoms (Exception: a double dose of antibiotics)    Negative: Diabetes drug error or overdose (e.g., took wrong type of insulin or took extra dose)    Negative: Caller has medication question about med not prescribed by PCP and triager unable to answer question (e.g., compatibility with other med, storage)    Request for URGENT new prescription or refill of 'essential' medication (i.e., likelihood of harm to patient if not taken) and triager unable to fill per department policy    Protocols used: MEDICATION QUESTION CALL-A-OH

## 2021-01-03 RX ORDER — TOPIRAMATE 100 MG/1
TABLET, FILM COATED ORAL
Qty: 90 TABLET | Refills: 0 | OUTPATIENT
Start: 2021-01-03

## 2021-01-12 ENCOUNTER — OFFICE VISIT (OUTPATIENT)
Dept: FAMILY MEDICINE | Facility: CLINIC | Age: 61
End: 2021-01-12
Payer: COMMERCIAL

## 2021-01-12 VITALS
TEMPERATURE: 97.8 F | SYSTOLIC BLOOD PRESSURE: 110 MMHG | RESPIRATION RATE: 16 BRPM | HEIGHT: 60 IN | BODY MASS INDEX: 29.11 KG/M2 | HEART RATE: 80 BPM | DIASTOLIC BLOOD PRESSURE: 80 MMHG | WEIGHT: 148.25 LBS

## 2021-01-12 DIAGNOSIS — R10.84 ABDOMINAL PAIN, GENERALIZED: ICD-10-CM

## 2021-01-12 DIAGNOSIS — K58.2 IRRITABLE BOWEL SYNDROME WITH BOTH CONSTIPATION AND DIARRHEA: Primary | ICD-10-CM

## 2021-01-12 DIAGNOSIS — E11.3299 TYPE 2 DIABETES MELLITUS WITH MILD NONPROLIFERATIVE RETINOPATHY WITHOUT MACULAR EDEMA, WITHOUT LONG-TERM CURRENT USE OF INSULIN, UNSPECIFIED LATERALITY (H): ICD-10-CM

## 2021-01-12 LAB
ALBUMIN SERPL-MCNC: 3.8 G/DL (ref 3.4–5)
ALP SERPL-CCNC: 82 U/L (ref 40–150)
ALT SERPL W P-5'-P-CCNC: 27 U/L (ref 0–50)
ANION GAP SERPL CALCULATED.3IONS-SCNC: 8 MMOL/L (ref 3–14)
AST SERPL W P-5'-P-CCNC: 11 U/L (ref 0–45)
BILIRUB DIRECT SERPL-MCNC: 0.1 MG/DL (ref 0–0.2)
BILIRUB SERPL-MCNC: 0.5 MG/DL (ref 0.2–1.3)
BUN SERPL-MCNC: 18 MG/DL (ref 7–30)
CALCIUM SERPL-MCNC: 8.8 MG/DL (ref 8.5–10.1)
CHLORIDE SERPL-SCNC: 111 MMOL/L (ref 94–109)
CO2 SERPL-SCNC: 22 MMOL/L (ref 20–32)
CREAT SERPL-MCNC: 0.9 MG/DL (ref 0.52–1.04)
ERYTHROCYTE [DISTWIDTH] IN BLOOD BY AUTOMATED COUNT: 13.7 % (ref 10–15)
GFR SERPL CREATININE-BSD FRML MDRD: 70 ML/MIN/{1.73_M2}
GLUCOSE SERPL-MCNC: 213 MG/DL (ref 70–99)
HBA1C MFR BLD: 11.8 % (ref 0–5.6)
HCT VFR BLD AUTO: 41.6 % (ref 35–47)
HGB BLD-MCNC: 13.2 G/DL (ref 11.7–15.7)
LIPASE SERPL-CCNC: 329 U/L (ref 73–393)
MCH RBC QN AUTO: 28.2 PG (ref 26.5–33)
MCHC RBC AUTO-ENTMCNC: 31.7 G/DL (ref 31.5–36.5)
MCV RBC AUTO: 89 FL (ref 78–100)
PLATELET # BLD AUTO: 184 10E9/L (ref 150–450)
POTASSIUM SERPL-SCNC: 4.3 MMOL/L (ref 3.4–5.3)
PROT SERPL-MCNC: 6.9 G/DL (ref 6.8–8.8)
RBC # BLD AUTO: 4.68 10E12/L (ref 3.8–5.2)
SODIUM SERPL-SCNC: 141 MMOL/L (ref 133–144)
WBC # BLD AUTO: 5.4 10E9/L (ref 4–11)

## 2021-01-12 PROCEDURE — 80048 BASIC METABOLIC PNL TOTAL CA: CPT | Performed by: PHYSICIAN ASSISTANT

## 2021-01-12 PROCEDURE — 83690 ASSAY OF LIPASE: CPT | Performed by: PHYSICIAN ASSISTANT

## 2021-01-12 PROCEDURE — 80076 HEPATIC FUNCTION PANEL: CPT | Performed by: PHYSICIAN ASSISTANT

## 2021-01-12 PROCEDURE — 85027 COMPLETE CBC AUTOMATED: CPT | Performed by: PHYSICIAN ASSISTANT

## 2021-01-12 PROCEDURE — 99214 OFFICE O/P EST MOD 30 MIN: CPT | Performed by: PHYSICIAN ASSISTANT

## 2021-01-12 PROCEDURE — 83036 HEMOGLOBIN GLYCOSYLATED A1C: CPT | Performed by: PHYSICIAN ASSISTANT

## 2021-01-12 PROCEDURE — 36415 COLL VENOUS BLD VENIPUNCTURE: CPT | Performed by: PHYSICIAN ASSISTANT

## 2021-01-12 RX ORDER — POLYETHYLENE GLYCOL 3350 17 G/17G
1 POWDER, FOR SOLUTION ORAL DAILY
Qty: 765 G | Refills: 0 | Status: SHIPPED | OUTPATIENT
Start: 2021-01-12

## 2021-01-12 ASSESSMENT — ENCOUNTER SYMPTOMS
LIGHT-HEADEDNESS: 0
FEVER: 0
ABDOMINAL PAIN: 0
HEARTBURN: 1
SHORTNESS OF BREATH: 0
CONSTIPATION: 1
DIARRHEA: 1
NERVOUS/ANXIOUS: 0
NAUSEA: 0
VOMITING: 0

## 2021-01-12 ASSESSMENT — MIFFLIN-ST. JEOR: SCORE: 1167.93

## 2021-01-12 ASSESSMENT — PAIN SCALES - GENERAL: PAINLEVEL: MODERATE PAIN (5)

## 2021-01-12 NOTE — PROGRESS NOTES
Assessment & Plan     Irritable bowel syndrome with both constipation and diarrhea  Abdominal pain, generalized  Patient is a 60-year-old female who has long history of IBS.  Patient is currently on daily stool softeners, 2-3 times per week of senna, and Trulance.  She notes that Linzess seems work better, but insurance will not cover this medication.  Vital signs normal.  Physical exam significant for generalized upper abdominal discomfort.  Low suspicion for acute abdomen as there is no rebound or guarding, symptoms are chronic worsening, and vital signs are normal.    Discussed IBS management including increasing water intake, daily exercise, and will switch patient from daily stool softeners to daily MiraLAX.  Patient to continue senna 2-3 times per week as well as Trulance.  Patient to follow-up in 2 weeks for reevaluation of symptoms.  Due to abdominal discomfort, will complete lab work today.  - CBC with platelets  - Basic metabolic panel  (Ca, Cl, CO2, Creat, Gluc, K, Na, BUN)  - Hepatic panel (Albumin, ALT, AST, Bili, Alk Phos, TP)  - polyethylene glycol (MIRALAX) 17 GM/Dose powder; Take 17 g (1 capful) by mouth daily  - Lipase    Type 2 diabetes mellitus with mild nonproliferative retinopathy without macular edema, without long-term current use of insulin, unspecified laterality (H)  Patient notes poor diabetes management with elevated A1c from July 2020.  At that time patient did refill medications and has been compliant with evening doses.  Patient admits to noncompliance with morning dose of medications.  Will recheck A1c.  Patient to follow-up with PCP for discussion of A1c results and further management.  Also discussed setting alarm or using pillbox for morning medications.  - Hemoglobin A1c      45 minutes spent on the date of the encounter doing chart review, history and exam, documentation and further activities as noted above      See Patient Instructions    Return in about 2 weeks (around  "1/26/2021) for Reevaluation.    LOUIS Silver [facility] SHANT Andrew is a 60 year old who presents to clinic today for the following health issues: Constipation.     HPI     Patient notes history of IBS-constipation and diarrhea since she was young, and was taking Linzess which was working for her. Her insurance changed and she was switched to CapableBits which is not working for her. She is also using stool softeners, Docusate sodium, at night as well as laxative, Sennosides, 1-2 times per week. Patient notes hemorrhoids due to chronic constipation. Patient did use Mirilax in the past and felt that it didn't work well. Patient cannot recall when her last BM was, sometime last week per patient. Last colonoscopy 2011.     Abdominal/Flank Pain  Onset/Duration: Ongoing since September  Description:   Character: She notes that she is \"uses to it\", currently she is having some burning and cramping  Location: pelvic region  Radiation: None  Intensity: severe  Progression of Symptoms:  worsening  Accompanying Signs & Symptoms:  Fever/Chills: no  Gas/Bloating: no  Nausea: no  Vomitting: no  Diarrhea: Yes, baseline   Constipation: YES- She is unsure when her last BM was, maybe last week. She notes these are smaller caliber and there is blood in BM and on toilet paper. PMHx hemorrhoids   Dysuria or Hematuria: YES- Hematuria  History:   Trauma: no  Previous similar pain: no  Previous tests done: Colonoscopy  Precipitating factors:   Does the pain change with:     Food: YES- meat    Bowel Movement: YES- worse when straining to having BM    Urination: no   Other factors:  no  Therapies tried and outcome: Protonix, stool softeners  No LMP recorded. Patient is postmenopausal.    Patient notes that her diabetes is not well controlled. She notes she ran out of her medications and was unable to  her medication. She restarted the medications this past summer around August. She would like " "AIC rechecked. She notes that she misses her morning medications around three times per week. She always takes the evening medications.       Review of Systems   Constitutional: Negative for fever.   HENT: Negative for congestion.    Respiratory: Negative for shortness of breath.    Cardiovascular: Negative for chest pain.   Gastrointestinal: Positive for constipation, diarrhea and heartburn. Negative for abdominal pain, nausea and vomiting.   Skin: Negative for rash.   Neurological: Negative for light-headedness.   Psychiatric/Behavioral: The patient is not nervous/anxious.             Objective    /80   Pulse 80   Temp 97.8  F (36.6  C) (Tympanic)   Resp 16   Ht 1.53 m (5' 0.25\")   Wt 67.2 kg (148 lb 4 oz)   BMI 28.71 kg/m    Body mass index is 28.71 kg/m .  Physical Exam  Vitals signs and nursing note reviewed.   Constitutional:       General: She is not in acute distress.     Appearance: Normal appearance.   HENT:      Head: Normocephalic and atraumatic.      Mouth/Throat:      Mouth: Mucous membranes are moist.      Pharynx: Oropharynx is clear.   Eyes:      Extraocular Movements: Extraocular movements intact.      Pupils: Pupils are equal, round, and reactive to light.   Neck:      Musculoskeletal: Normal range of motion.   Cardiovascular:      Rate and Rhythm: Normal rate and regular rhythm.      Heart sounds: Normal heart sounds.   Pulmonary:      Effort: Pulmonary effort is normal.      Breath sounds: Normal breath sounds.   Abdominal:      General: Bowel sounds are normal.      Palpations: Abdomen is soft.      Tenderness: There is abdominal tenderness (general, upper abdomen). There is no guarding or rebound.   Musculoskeletal: Normal range of motion.   Skin:     General: Skin is warm and dry.   Neurological:      General: No focal deficit present.      Mental Status: She is alert.   Psychiatric:         Mood and Affect: Mood normal.         Behavior: Behavior normal.            CBC, BMP, A1c, " hepatic panel, lipase pending

## 2021-01-12 NOTE — LETTER
[facility]    [address] [place] [address]  [phone]                                   January 13, 2021    Lorrie SCHUSTER Hayden  [address] [facility] [address]        Hi Lorrie,     Your A1C has increased slightly. This is probably due to missing doses of your diabetes medications.   The remaining lab work is within normal limits.     Please follow up with your primary care provider as discussed.     Results for orders placed or performed in visit on 01/12/21   Hemoglobin A1c     Status: Abnormal   Result Value Ref Range    Hemoglobin A1C 11.8 (H) 0 - 5.6 %   CBC with platelets     Status: None   Result Value Ref Range    WBC 5.4 4.0 - 11.0 10e9/L    RBC Count 4.68 3.8 - 5.2 10e12/L    Hemoglobin 13.2 11.7 - 15.7 g/dL    Hematocrit 41.6 35.0 - 47.0 %    MCV 89 78 - 100 fl    MCH 28.2 26.5 - 33.0 pg    MCHC 31.7 31.5 - 36.5 g/dL    RDW 13.7 10.0 - 15.0 %    Platelet Count 184 150 - 450 10e9/L   Basic metabolic panel  (Ca, Cl, CO2, Creat, Gluc, K, Na, BUN)     Status: Abnormal   Result Value Ref Range    Sodium 141 133 - 144 mmol/L    Potassium 4.3 3.4 - 5.3 mmol/L    Chloride 111 (H) 94 - 109 mmol/L    Carbon Dioxide 22 20 - 32 mmol/L    Anion Gap 8 3 - 14 mmol/L    Glucose 213 (H) 70 - 99 mg/dL    Urea Nitrogen 18 7 - 30 mg/dL    Creatinine 0.90 0.52 - 1.04 mg/dL    GFR Estimate 70 >60 mL/min/[1.73_m2]    GFR Estimate If Black 81 >60 mL/min/[1.73_m2]    Calcium 8.8 8.5 - 10.1 mg/dL   Hepatic panel (Albumin, ALT, AST, Bili, Alk Phos, TP)     Status: None   Result Value Ref Range    Bilirubin Direct 0.1 0.0 - 0.2 mg/dL    Bilirubin Total 0.5 0.2 - 1.3 mg/dL    Albumin 3.8 3.4 - 5.0 g/dL    Protein Total 6.9 6.8 - 8.8 g/dL    Alkaline Phosphatase 82 40 - 150 U/L    ALT 27 0 - 50 U/L    AST 11 0 - 45 U/L   Lipase     Status: None   Result Value Ref Range    Lipase 329 73 - 393 U/L       If you have any questions please call the clinic at [phone]    Sincerely,    Tika Lyon  PA-C  bmd

## 2021-01-12 NOTE — PATIENT INSTRUCTIONS
-For diabetes, we are checking your A1c today.  Please use a pillbox or alarm to help remember to take your morning as well as your evening doses of your medications.    -For IBS, I would like you to continue using your prescribed medications.  Additionally, please use MiraLAX at the same time every day to help create regular bowel movements.  Adequate hydration and daily exercise will also help manage IBS symptoms better.  We are completing lab work as you do have some abdominal pain today.    -It is very important that you follow-up in the emergency department if you develop black stools significant mount of blood in the stools, worsening abdominal pain, fevers, or any other severe symptoms.    -Please schedule a follow-up visit with Mary Kay, your primary care provider, in 2 weeks for recheck as well as discussion regarding your A1c and diabetes management.

## 2021-02-23 ENCOUNTER — TELEPHONE (OUTPATIENT)
Dept: FAMILY MEDICINE | Facility: CLINIC | Age: 61
End: 2021-02-23

## 2021-03-04 ENCOUNTER — TELEPHONE (OUTPATIENT)
Dept: FAMILY MEDICINE | Facility: CLINIC | Age: 61
End: 2021-03-04

## 2021-03-04 ENCOUNTER — VIRTUAL VISIT (OUTPATIENT)
Dept: FAMILY MEDICINE | Facility: CLINIC | Age: 61
End: 2021-03-04
Payer: COMMERCIAL

## 2021-03-04 DIAGNOSIS — E78.5 HYPERLIPIDEMIA LDL GOAL <100: ICD-10-CM

## 2021-03-04 DIAGNOSIS — Z11.59 NEED FOR HEPATITIS C SCREENING TEST: ICD-10-CM

## 2021-03-04 DIAGNOSIS — E11.65 TYPE 2 DIABETES MELLITUS WITH HYPERGLYCEMIA, WITHOUT LONG-TERM CURRENT USE OF INSULIN (H): Primary | ICD-10-CM

## 2021-03-04 DIAGNOSIS — Z11.4 SCREENING FOR HIV (HUMAN IMMUNODEFICIENCY VIRUS): ICD-10-CM

## 2021-03-04 DIAGNOSIS — K21.9 GASTROESOPHAGEAL REFLUX DISEASE WITHOUT ESOPHAGITIS: ICD-10-CM

## 2021-03-04 DIAGNOSIS — I10 HYPERTENSION GOAL BP (BLOOD PRESSURE) < 140/90: ICD-10-CM

## 2021-03-04 DIAGNOSIS — Z63.5 MARITAL PROBLEM INVOLVING DIVORCE: ICD-10-CM

## 2021-03-04 PROCEDURE — 99214 OFFICE O/P EST MOD 30 MIN: CPT | Mod: 95 | Performed by: FAMILY MEDICINE

## 2021-03-04 RX ORDER — ROSUVASTATIN CALCIUM 20 MG/1
20 TABLET, COATED ORAL DAILY
Qty: 90 TABLET | Refills: 0 | Status: SHIPPED | OUTPATIENT
Start: 2021-03-04

## 2021-03-04 RX ORDER — DAPAGLIFLOZIN 10 MG/1
10 TABLET, FILM COATED ORAL DAILY
Qty: 90 TABLET | Refills: 0 | Status: SHIPPED | OUTPATIENT
Start: 2021-03-04 | End: 2021-03-15

## 2021-03-04 RX ORDER — DULAGLUTIDE 1.5 MG/.5ML
1.5 INJECTION, SOLUTION SUBCUTANEOUS
Qty: 3 ML | Refills: 0 | Status: SHIPPED | OUTPATIENT
Start: 2021-03-04 | End: 2021-03-09

## 2021-03-04 RX ORDER — TELMISARTAN 40 MG/1
40 TABLET ORAL DAILY
Qty: 90 TABLET | Refills: 0 | Status: SHIPPED | OUTPATIENT
Start: 2021-03-04

## 2021-03-04 RX ORDER — PANTOPRAZOLE SODIUM 20 MG/1
20 TABLET, DELAYED RELEASE ORAL 2 TIMES DAILY
Qty: 180 TABLET | Refills: 0 | Status: ON HOLD | OUTPATIENT
Start: 2021-03-04 | End: 2021-03-25

## 2021-03-04 SDOH — SOCIAL STABILITY - SOCIAL INSECURITY: DISRUPTION OF FAMILY BY SEPARATION AND DIVORCE: Z63.5

## 2021-03-04 NOTE — PROGRESS NOTES
Lorrie is a 60 year old who is being evaluated via a billable video visit.      How would you like to obtain your AVS? MyChart  If the video visit is dropped, the invitation should be resent by: Text to cell phone: [phone]  Will anyone else be joining your video visit? No    Video Start Time: 11:10am- switched to Tele visit, Video failed to connect    Assessment & Plan     Type 2 diabetes mellitus with hyperglycemia, without long-term current use of insulin (H), uncontrolled. Out of meds  - OPTOMETRY REFERRAL  Patient educated on the importance of taking meds as prescribed. Has been doubling her Farxiga dose to twice a day and running out of meds.  - Refill: dapagliflozin (FARXIGA) 10 MG TABS tablet  Dispense: 90 tablet; Refill: 0  - Discontinue Glipizide and Start a GLP-1RA: dulaglutide (TRULICITY) 1.5 MG/0.5ML pen  Dispense: 3 mL; Refill: 0  - Refill: metFORMIN (GLUCOPHAGE) 1000 MG tablet  Dispense: 180 tablet; Refill: 3  - Repeat A1C in 3 months    Hypertension goal BP (blood pressure) < 140/90, controlled  - Refill: telmisartan (MICARDIS) 40 MG tablet  Dispense: 90 tablet; Refill: 0    Hyperlipidemia LDL goal <100  - Refill:rosuvastatin (CRESTOR) 20 MG tablet  Dispense: 90 tablet; Refill: 0  - Lipid Profile    Screening for HIV (human immunodeficiency virus)  - HIV Antigen Antibody Combo    Need for hepatitis C screening test  - Hepatitis C Screen Reflex to HCV RNA Quant and Genotype    Marital problem involving divorce  Patient feels safe. Lives at home with her parents. Looking for a job.    Gastroesophageal reflux disease without esophagitis, uncontrolled symptoms.  Patient education on long term PPI use and avoid using more than is prescribed. Patient verbalized understanding.  - pantoprazole (PROTONIX) 20 MG EC tablet  Dispense: 180 tablet; Refill: 0  - GASTROENTEROLOGY ADULT REF PROCEDURE ONLY       BMI:   Estimated body mass index is 28.71 kg/m  as calculated from the following:    Height as of  "1/12/21: 1.53 m (5' 0.25\").    Weight as of 1/12/21: 67.2 kg (148 lb 4 oz).   Weight management plan: Discussed healthy diet and exercise guidelines        Return in about 2 months (around 5/4/2021) for Diabetes Follow Up with a HgbA1C prior to visit.    Tegan Alvarado MD  [facility] SHANT Andrew is a 60 year old who presents for the following health issues     HPI     Patient is 60 years old and states that she is going through a divorce. Has financial issues and is currently living with her parents and taking care of them.     States that she has had issues with being able to afford labs, office visits and meds- currently has [employer] Insurance which helps.   States that a Care Coord Referral was completed in the past and did not help much.      Diabetes Follow-up  Reports that she's been out of meds for about  Week.   Has been taking Farxiga 10 mg BID instead of once a day as prescribed, Metformin 1000 mg BID and Glipizide 10 mg BID.  Recent A1C  Component      Latest Ref Rng & Units 1/12/2021   Hemoglobin A1C      0 - 5.6 % 11.8 (H)     How often are you checking your blood sugar? A few times a month  What time of day are you checking your blood sugars (select all that apply)?  Before meals  Have you had any blood sugars above 200?  Yes 200  Have you had any blood sugars below 70?  No    What symptoms do you notice when your blood sugar is low?  None    What concerns do you have today about your diabetes? None     Do you have any of these symptoms? (Select all that apply)  No numbness or tingling in feet.  No redness, sores or blisters on feet.  No complaints of excessive thirst.  No reports of blurry vision.  No significant changes to weight.    Have you had a diabetic eye exam in the last 12 months? Yes-  Location: [facility]      Hyperlipidemia Follow-Up      Are you regularly taking any medication or supplement to lower your cholesterol?   Yes- rosuvastatin    Are you " having muscle aches or other side effects that you think could be caused by your cholesterol lowering medication?  Yes- fingers, elbows, shoulders     Last lipid panel-  Recent Labs   Lab Test 07/02/20  1523 11/12/18  0811   CHOL 257* 236*   HDL 41* 48*   LDL Cannot estimate LDL when triglyceride exceeds 400 mg/dL  158* 140*   TRIG 497* 240*     Follow up LDL-   Component      Latest Ref Rng & Units 7/2/2020   LDL Cholesterol Direct      <100 mg/dL 158 (H)     Due for a follow up lipid panel.    Hypertension Follow-up  Unable to tolerate ACEI  Currently on an ARB- Telmisartan, states that she is tolerating it well.   Recent BPs  BP Readings from Last 3 Encounters:   01/12/21 110/80   07/02/20 117/81   11/06/19 124/88       Do you check your blood pressure regularly outside of the clinic? No     Are you following a low salt diet? Yes    Are your blood pressures ever more than 140 on the top number (systolic) OR more   than 90 on the bottom number (diastolic), for example 140/90? Not sure    BP Readings from Last 2 Encounters:   01/12/21 110/80   07/02/20 117/81     Hemoglobin A1C (%)   Date Value   01/12/2021 11.8 (H)   07/02/2020 11.4 (H)     LDL Cholesterol Calculated (mg/dL)   Date Value   07/02/2020     Cannot estimate LDL when triglyceride exceeds 400 mg/dL   11/12/2018 140 (H)     LDL Cholesterol Direct (mg/dL)   Date Value   07/02/2020 158 (H)         How many servings of fruits and vegetables do you eat daily?  2-3    On average, how many sweetened beverages do you drink each day (Examples: soda, juice, sweet tea, etc.  Do NOT count diet or artificially sweetened beverages)?   0    How many days per week do you exercise enough to make your heart beat faster? none    How many minutes a day do you exercise enough to make your heart beat faster? none  How many days per week do you miss taking your medication? Has been skipping her morning pills     What makes it hard for you to take your medications?  out of  medications     Medication Followup of Pantoprazole (protonix) 20 mg    Taking Medication as prescribed: yes    Side Effects:  None    Medication Helping Symptoms:  Not really, takes 20 mg in AM and 40 mg in PM         Review of Systems   Constitutional, HEENT, cardiovascular, pulmonary, gi and gu systems are negative, except as otherwise noted.      Objective           Vitals:  No vitals were obtained today due to virtual visit.    Physical Exam   RESP: No audible wheeze, cough, or visible cyanosis.  No visible retractions or increased work of breathing.    Unable to complete the remainder of the exam due to the Virtual Platform.    DATA  Labs reviewed in Epic as stated above.             Video-Visit Details    Type of service:  Video Visit    Video End Time:11:40    Originating Location (pt. Location): Home    Distant Location (provider location):  [facility] [employer] [facility]     Platform used for Video Visit: vidIQ

## 2021-03-05 DIAGNOSIS — E11.3299 TYPE 2 DIABETES MELLITUS WITH MILD NONPROLIFERATIVE RETINOPATHY WITHOUT MACULAR EDEMA, WITHOUT LONG-TERM CURRENT USE OF INSULIN, UNSPECIFIED LATERALITY (H): ICD-10-CM

## 2021-03-05 DIAGNOSIS — E11.65 TYPE 2 DIABETES MELLITUS WITH HYPERGLYCEMIA, WITHOUT LONG-TERM CURRENT USE OF INSULIN (H): ICD-10-CM

## 2021-03-05 RX ORDER — DULAGLUTIDE 0.75 MG/.5ML
0.75 INJECTION, SOLUTION SUBCUTANEOUS
Qty: 2 ML | Refills: 0 | Status: ON HOLD | OUTPATIENT
Start: 2021-03-05 | End: 2021-03-21

## 2021-03-05 RX ORDER — DULAGLUTIDE 0.75 MG/.5ML
0.75 INJECTION, SOLUTION SUBCUTANEOUS
Qty: 1 ML | Refills: 0 | Status: SHIPPED | OUTPATIENT
Start: 2021-03-05 | End: 2021-03-05

## 2021-03-05 NOTE — TELEPHONE ENCOUNTER
Central Prior Authorization Team   Phone: [phone]    PA Initiation    Medication: dulaglutide (TRULICITY) 1.5 MG/0.5ML pen  Insurance Company: [employer] [place] - Phone [phone] Fax [phone]  Pharmacy Filling the Rx: [facility] [address] [place] [address]  Filling Pharmacy Phone: [phone]  Filling Pharmacy Fax: [phone]  Start Date: 3/5/2021

## 2021-03-08 NOTE — TELEPHONE ENCOUNTER
PRIOR AUTHORIZATION DENIED    Medication: dulaglutide (TRULICITY) 1.5 MG/0.5ML pen-DENIED    Denial Date: 3/5/2021    Denial Rational: PATIENT MUST TRY/FAIL TWO FORMULARY ALTERNATIVES -           Appeal Information:  IF PATIENT IS UNABLE TO TRY/FAIL ALTERNATIVE(S) PLEASE SUPPLY PA TEAM WITH A LETTER OF MEDICAL NECESSITY WITH CLINICAL REASON.

## 2021-03-09 ENCOUNTER — TELEPHONE (OUTPATIENT)
Dept: FAMILY MEDICINE | Facility: CLINIC | Age: 61
End: 2021-03-09

## 2021-03-09 DIAGNOSIS — E11.65 TYPE 2 DIABETES MELLITUS WITH HYPERGLYCEMIA, WITHOUT LONG-TERM CURRENT USE OF INSULIN (H): Primary | ICD-10-CM

## 2021-03-09 RX ORDER — LIRAGLUTIDE 6 MG/ML
1.2 INJECTION SUBCUTANEOUS DAILY
Qty: 3 ML | Refills: 1 | Status: ON HOLD | OUTPATIENT
Start: 2021-03-09 | End: 2021-03-21

## 2021-03-09 NOTE — TELEPHONE ENCOUNTER
Please send RX for 6 ml or 9 ml, that is two size boxes available, we can't dispense a partial box

## 2021-03-11 RX ORDER — LIRAGLUTIDE 6 MG/ML
0.6 INJECTION SUBCUTANEOUS DAILY
Qty: 6 ML | Refills: 0 | Status: SHIPPED | OUTPATIENT
Start: 2021-03-11

## 2021-03-15 ENCOUNTER — OFFICE VISIT (OUTPATIENT)
Dept: FAMILY MEDICINE | Facility: CLINIC | Age: 61
End: 2021-03-15
Payer: COMMERCIAL

## 2021-03-15 VITALS
TEMPERATURE: 98.4 F | HEART RATE: 100 BPM | RESPIRATION RATE: 20 BRPM | HEIGHT: 60 IN | WEIGHT: 148.13 LBS | OXYGEN SATURATION: 96 % | SYSTOLIC BLOOD PRESSURE: 120 MMHG | DIASTOLIC BLOOD PRESSURE: 94 MMHG | BODY MASS INDEX: 29.08 KG/M2

## 2021-03-15 DIAGNOSIS — R30.0 DYSURIA: Primary | ICD-10-CM

## 2021-03-15 DIAGNOSIS — R10.84 ABDOMINAL PAIN, GENERALIZED: ICD-10-CM

## 2021-03-15 DIAGNOSIS — E11.65 TYPE 2 DIABETES MELLITUS WITH HYPERGLYCEMIA, WITHOUT LONG-TERM CURRENT USE OF INSULIN (H): ICD-10-CM

## 2021-03-15 DIAGNOSIS — R11.0 NAUSEA: ICD-10-CM

## 2021-03-15 LAB
ALBUMIN UR-MCNC: NEGATIVE MG/DL
ANION GAP SERPL CALCULATED.3IONS-SCNC: 6 MMOL/L (ref 3–14)
APPEARANCE UR: CLEAR
BILIRUB UR QL STRIP: NEGATIVE
BUN SERPL-MCNC: 15 MG/DL (ref 7–30)
CALCIUM SERPL-MCNC: 10 MG/DL (ref 8.5–10.1)
CHLORIDE SERPL-SCNC: 103 MMOL/L (ref 94–109)
CO2 SERPL-SCNC: 25 MMOL/L (ref 20–32)
COLOR UR AUTO: YELLOW
CREAT SERPL-MCNC: 0.9 MG/DL (ref 0.52–1.04)
ERYTHROCYTE [DISTWIDTH] IN BLOOD BY AUTOMATED COUNT: 13.5 % (ref 10–15)
GFR SERPL CREATININE-BSD FRML MDRD: 69 ML/MIN/{1.73_M2}
GLUCOSE SERPL-MCNC: 181 MG/DL (ref 70–99)
GLUCOSE UR STRIP-MCNC: >=1000 MG/DL
HCT VFR BLD AUTO: 47.5 % (ref 35–47)
HGB BLD-MCNC: 14.9 G/DL (ref 11.7–15.7)
HGB UR QL STRIP: NEGATIVE
KETONES UR STRIP-MCNC: 15 MG/DL
LEUKOCYTE ESTERASE UR QL STRIP: NEGATIVE
LIPASE SERPL-CCNC: 189 U/L (ref 73–393)
MCH RBC QN AUTO: 28 PG (ref 26.5–33)
MCHC RBC AUTO-ENTMCNC: 31.4 G/DL (ref 31.5–36.5)
MCV RBC AUTO: 89 FL (ref 78–100)
NITRATE UR QL: NEGATIVE
PH UR STRIP: 7 PH (ref 5–7)
PLATELET # BLD AUTO: 203 10E9/L (ref 150–450)
POTASSIUM SERPL-SCNC: 4.3 MMOL/L (ref 3.4–5.3)
RBC # BLD AUTO: 5.32 10E12/L (ref 3.8–5.2)
SODIUM SERPL-SCNC: 134 MMOL/L (ref 133–144)
SOURCE: ABNORMAL
SP GR UR STRIP: 1.01 (ref 1–1.03)
SPECIMEN SOURCE: NORMAL
UROBILINOGEN UR STRIP-ACNC: 0.2 EU/DL (ref 0.2–1)
WBC # BLD AUTO: 5.9 10E9/L (ref 4–11)
WET PREP SPEC: NORMAL

## 2021-03-15 PROCEDURE — 80048 BASIC METABOLIC PNL TOTAL CA: CPT | Performed by: PHYSICIAN ASSISTANT

## 2021-03-15 PROCEDURE — 85027 COMPLETE CBC AUTOMATED: CPT | Performed by: PHYSICIAN ASSISTANT

## 2021-03-15 PROCEDURE — 99214 OFFICE O/P EST MOD 30 MIN: CPT | Performed by: PHYSICIAN ASSISTANT

## 2021-03-15 PROCEDURE — 81003 URINALYSIS AUTO W/O SCOPE: CPT | Performed by: PHYSICIAN ASSISTANT

## 2021-03-15 PROCEDURE — 83690 ASSAY OF LIPASE: CPT | Performed by: PHYSICIAN ASSISTANT

## 2021-03-15 PROCEDURE — 87210 SMEAR WET MOUNT SALINE/INK: CPT | Performed by: PHYSICIAN ASSISTANT

## 2021-03-15 PROCEDURE — 36415 COLL VENOUS BLD VENIPUNCTURE: CPT | Performed by: PHYSICIAN ASSISTANT

## 2021-03-15 RX ORDER — ONDANSETRON 4 MG/1
4 TABLET, ORALLY DISINTEGRATING ORAL EVERY 8 HOURS PRN
Qty: 10 TABLET | Refills: 0 | Status: SHIPPED | OUTPATIENT
Start: 2021-03-15

## 2021-03-15 ASSESSMENT — MIFFLIN-ST. JEOR: SCORE: 1167.36

## 2021-03-15 NOTE — PROGRESS NOTES
Assessment & Plan     Dysuria  Abdominal pain, generalized  Nausea  Patient is a 60-year-old female who presents to clinic due to 4 days of dysuria, frequency, chills, flank pain, and significant nausea.  Patient has chronic generalized abdominal pain.  Vital signs without fever.  Physical exam significant for generalized abdominal tenderness as well as right and left CVA tenderness.  Low suspicion for acute abdomen as patient is afebrile and there is no rebound or guarding on exam.  Urinalysis negative for infection.  Wet prep negative for bacterial vaginosis or candidiasis.      Patient does have history of severe IBS noting that she has not had stool in approximately 3-4 days despite taking prescribed medications.    Symptoms are most likely due to IBS, but given noted changes in symptoms, will evaluate further with lab work.  Discussed notes that warrant urgent/emergent follow-up.  Patient prescribed Zofran for nausea.    - Wet prep  - UA reflex to Microscopic and Culture  - CBC with platelets  - Basic metabolic panel  (Ca, Cl, CO2, Creat, Gluc, K, Na, BUN)  - Lipase  - ondansetron (ZOFRAN-ODT) 4 MG ODT tab; Take 1 tablet (4 mg) by mouth every 8 hours as needed for nausea    Type 2 diabetes mellitus with hyperglycemia, without long-term current use of insulin (H)  Patient notes recent start of Victoza, but states she does not have insulin pen needles to use with this medication.  Rx provided.  - insulin pen needle (31G X 5 MM) 31G X 5 MM miscellaneous; Use with Victoza daily as directed.     See Patient Instructions    Return in about 1 week (around 3/22/2021), or if symptoms worsen or fail to improve.    Tika Lyon PA-C  [facility] SHANT Andrew is a 60 year old who presents for the following health issues:    HPI       Genitourinary - Female  Onset/Duration: 4 days  Description:   Painful urination (Dysuria): YES           Frequency: YES  Blood in urine (Hematuria):  "no  Delay in urine (Hesitency): no  Intensity: moderate  Progression of Symptoms:  worsening  Accompanying Signs & Symptoms:  Fever/chills: YES- chills, no fever  Flank pain: YES- Bilateral flank  Nausea and vomiting: YES- nausea due to reflux  Vaginal symptoms: none  Abdominal/Pelvic Pain: Yes, Chronic, related to IBS, not changed   History:   History of frequent UTI s: YES  History of kidney stones: no  Sexually Active: no  Possibility of pregnancy: No  Precipitating or alleviating factors: None  Therapies tried and outcome:  None     Patient has history of DM and is compliant with medications.  Patient notes significant history of IBS.  Patient compliant with prescribed medications, but states she has been constipated and not had stool in 3-4 days.        Objective    BP (!) 120/94   Pulse 100   Temp 98.4  F (36.9  C) (Tympanic)   Resp 20   Ht 1.53 m (5' 0.25\")   Wt 67.2 kg (148 lb 2 oz)   SpO2 96%   BMI 28.69 kg/m    Body mass index is 28.69 kg/m .  Physical Exam  Vitals signs and nursing note reviewed.   Constitutional:       General: She is not in acute distress.     Appearance: Normal appearance.   HENT:      Head: Normocephalic and atraumatic.   Eyes:      Extraocular Movements: Extraocular movements intact.      Pupils: Pupils are equal, round, and reactive to light.   Neck:      Musculoskeletal: Normal range of motion.   Cardiovascular:      Rate and Rhythm: Normal rate and regular rhythm.      Heart sounds: Normal heart sounds.   Pulmonary:      Effort: Pulmonary effort is normal.      Breath sounds: Normal breath sounds.   Abdominal:      General: Bowel sounds are normal.      Palpations: Abdomen is soft.      Tenderness: There is abdominal tenderness (diffuse ). There is right CVA tenderness and left CVA tenderness. There is no guarding or rebound.   Musculoskeletal: Normal range of motion.   Skin:     General: Skin is warm and dry.   Neurological:      General: No focal deficit present.      " Mental Status: She is alert.   Psychiatric:         Mood and Affect: Mood normal.         Behavior: Behavior normal.            Results for orders placed or performed in visit on 03/15/21   UA reflex to Microscopic and Culture     Status: Abnormal    Specimen: Midstream Urine   Result Value Ref Range    Color Urine Yellow     Appearance Urine Clear     Glucose Urine >=1000 (A) NEG^Negative mg/dL    Bilirubin Urine Negative NEG^Negative    Ketones Urine 15 (A) NEG^Negative mg/dL    Specific Gravity Urine 1.015 1.003 - 1.035    Blood Urine Negative NEG^Negative    pH Urine 7.0 5.0 - 7.0 pH    Protein Albumin Urine Negative NEG^Negative mg/dL    Urobilinogen Urine 0.2 0.2 - 1.0 EU/dL    Nitrite Urine Negative NEG^Negative    Leukocyte Esterase Urine Negative NEG^Negative    Source Midstream Urine    Wet prep     Status: None    Specimen: Vagina   Result Value Ref Range    Specimen Description Vagina     Wet Prep No Trichomonas seen     Wet Prep No clue cells seen     Wet Prep No yeast seen     Wet Prep Rare  WBC'S seen

## 2021-03-15 NOTE — PATIENT INSTRUCTIONS
Your lab work does not show any signs of urinary tract infection, yeast infection, or bacterial vaginosis.  We are completing blood work for further evaluation of your symptoms today.  Your symptoms may be due to IBS.  Please continue your current treatments to manage this.    If you develop fevers, severe abdominal pain, bloody stools, uncontrollable vomiting, or any other severe symptoms, it is important you go to the emergency department for a more urgent evaluation.    Your Victoza needles were sent to your pharmacy    Patient Education     Unknown Causes of Abdominal Pain (Female)    The exact cause of your belly (abdominal) pain is not clear. This does not mean that this is something to worry about. Everyone likes to know the exact cause of the problem. But sometimes with belly pain, there is no clear-cut cause, and this could be a good thing. The good news is that your symptoms can be treated, and you will feel better.   Your condition does not seem serious now. But sometimes the signs of a serious problem may take more time to appear. For this reason, it is important for you to watch for any new symptoms, problems, or worsening of your condition.  Over the next few days, the abdominal pain may come and go. Or it may be constant. Other common symptoms can include nausea and vomiting. Sometimes it can be difficult to tell if you feel nauseous. You may just feel bad and not connect that feeling to nausea. Constipation, diarrhea, and a fever may go along with the pain.  The pain may continue even if treated correctly over the following days. Depending on how things go, sometimes the cause can become clear and may need more or different treatment. Additional evaluations, medicines, or tests may also be needed.  Home care  Your healthcare provider may prescribe medicine for pain, symptoms, or an infection.  Follow the healthcare provider's instructions for taking these medicines.  General care    Rest as much as  you can until your next exam. No strenuous activities.    Try to find positions that ease discomfort. A small pillow placed on the abdomen may help relieve pain.    Something warm on your abdomen (such as a heating pad) may help, but be careful not to burn yourself.  Diet    Don t force yourself to eat, especially if having cramps, vomiting, or diarrhea.    Water is important so you don't get dehydrated. Soup may also be good. Sports drinks may also help, especially if they are not too acidic. Don't drink sugary drinks as this can make things worse. Take liquids in small amounts. Don t guzzle them.    Caffeine sometimes makes the pain and cramping worse.    Don t take dairy products if you have vomiting or diarrhea.    Don't eat large amounts at a time. Wait a few minutes between bites.    Eat a diet low in fiber (called a low-residue diet). Foods allowed include refined breads, white rice, fruit and vegetable juices without pulp, tender meats. These foods will pass more easily through the intestine.    Don t have whole-grain foods, whole fruits and vegetables, meats, seeds and nuts, fried or fatty foods, dairy, alcohol and spicy foods until your symptoms go away.  Follow-up care  Follow up with your healthcare provider, or as advised, if your pain does not begin to improve in the next 24 hours.  Call 911  Call 911 if any of these occur:    Trouble breathing    Confusion    Fainting or loss of consciousness    Rapid heart rate    Seizure  When to seek medical advice  Call your healthcare provider right away if any of these occur:    Pain gets worse or moves to the right lower abdomen    New or worsening vomiting or diarrhea    Swelling of the abdomen    Unable to pass stool for more than 3 days    Fever of 100.4 F (38 C) or higher, or as directed by your healthcare provider.    Blood in vomit or bowel movements (dark red or black color)    Yellow color of eyes and skin (jaundice)    Weakness, dizziness    Chest,  arm, back, neck, or jaw pain    Unexpected vaginal bleeding or missed period    Can't keep down liquids or water and you are getting dehydrated  [employer] last reviewed this educational content on 6/1/2018 2000-2020 [employer]. All rights reserved. This information is not intended as a substitute for professional medical care. Always follow your healthcare professional's instructions.

## 2021-03-16 ENCOUNTER — TELEPHONE (OUTPATIENT)
Dept: FAMILY MEDICINE | Facility: CLINIC | Age: 61
End: 2021-03-16

## 2021-03-16 ENCOUNTER — HOSPITAL ENCOUNTER (OUTPATIENT)
Facility: CLINIC | Age: 61
End: 2021-03-16
Attending: SURGERY | Admitting: SURGERY
Payer: COMMERCIAL

## 2021-03-16 DIAGNOSIS — R10.84 ABDOMINAL PAIN, GENERALIZED: Primary | ICD-10-CM

## 2021-03-16 DIAGNOSIS — Z11.59 ENCOUNTER FOR SCREENING FOR OTHER VIRAL DISEASES: ICD-10-CM

## 2021-03-16 DIAGNOSIS — R11.2 NON-INTRACTABLE VOMITING WITH NAUSEA, UNSPECIFIED VOMITING TYPE: ICD-10-CM

## 2021-03-16 NOTE — TELEPHONE ENCOUNTER
Called patient regarding ongoing symptoms.  Patient notes vomiting anytime she lays down, abdominal bloating, constipation x1 week-normal for her/chronic but worsening, and generalized abdominal pain.  Lab work without acute abnormalities.  Patient distressed.  Discussed symptoms that warrant emergent follow-up including fevers, worsening abdominal pain, inability to keep fluids down, or any other severe symptoms. Patient is agreeable.  CT abdomen/pelvis order placed for further evaluation.     Tika Lyon PA-C on 3/16/2021 at 4:18 PM

## 2021-03-18 ENCOUNTER — ANCILLARY PROCEDURE (OUTPATIENT)
Dept: CT IMAGING | Facility: CLINIC | Age: 61
End: 2021-03-18
Attending: PHYSICIAN ASSISTANT
Payer: COMMERCIAL

## 2021-03-18 DIAGNOSIS — R11.2 NON-INTRACTABLE VOMITING WITH NAUSEA, UNSPECIFIED VOMITING TYPE: ICD-10-CM

## 2021-03-18 DIAGNOSIS — R10.84 ABDOMINAL PAIN, GENERALIZED: ICD-10-CM

## 2021-03-18 PROCEDURE — 74177 CT ABD & PELVIS W/CONTRAST: CPT | Mod: TC | Performed by: RADIOLOGY

## 2021-03-18 RX ORDER — IOPAMIDOL 755 MG/ML
91 INJECTION, SOLUTION INTRAVASCULAR ONCE
Status: COMPLETED | OUTPATIENT
Start: 2021-03-18 | End: 2021-03-18

## 2021-03-18 RX ADMIN — IOPAMIDOL 91 ML: 755 INJECTION, SOLUTION INTRAVASCULAR at 11:41

## 2021-03-19 ENCOUNTER — TELEPHONE (OUTPATIENT)
Dept: FAMILY MEDICINE | Facility: CLINIC | Age: 61
End: 2021-03-19

## 2021-03-19 DIAGNOSIS — R11.2 NON-INTRACTABLE VOMITING WITH NAUSEA, UNSPECIFIED VOMITING TYPE: ICD-10-CM

## 2021-03-19 DIAGNOSIS — R10.84 ABDOMINAL PAIN, GENERALIZED: Primary | ICD-10-CM

## 2021-03-19 NOTE — TELEPHONE ENCOUNTER
Called patient and discussed results and next steps: GI Consult    Tika Lyon PA-C on 3/19/2021 at 1:22 PM

## 2021-03-21 ENCOUNTER — APPOINTMENT (OUTPATIENT)
Dept: ULTRASOUND IMAGING | Facility: CLINIC | Age: 61
End: 2021-03-21
Attending: EMERGENCY MEDICINE
Payer: COMMERCIAL

## 2021-03-21 ENCOUNTER — HOSPITAL ENCOUNTER (INPATIENT)
Facility: CLINIC | Age: 61
LOS: 4 days | Discharge: HOME | End: 2021-03-25
Attending: EMERGENCY MEDICINE | Admitting: FAMILY MEDICINE
Payer: COMMERCIAL

## 2021-03-21 ENCOUNTER — APPOINTMENT (OUTPATIENT)
Dept: CT IMAGING | Facility: CLINIC | Age: 61
End: 2021-03-21
Attending: EMERGENCY MEDICINE
Payer: COMMERCIAL

## 2021-03-21 DIAGNOSIS — R11.2 NAUSEA AND VOMITING, INTRACTABILITY OF VOMITING NOT SPECIFIED, UNSPECIFIED VOMITING TYPE: ICD-10-CM

## 2021-03-21 DIAGNOSIS — E87.20 LACTIC ACIDOSIS: ICD-10-CM

## 2021-03-21 DIAGNOSIS — R10.13 ABDOMINAL PAIN, EPIGASTRIC: ICD-10-CM

## 2021-03-21 DIAGNOSIS — Z11.52 ENCOUNTER FOR SCREENING LABORATORY TESTING FOR SEVERE ACUTE RESPIRATORY SYNDROME CORONAVIRUS 2 (SARS-COV-2): ICD-10-CM

## 2021-03-21 DIAGNOSIS — E87.20 ACIDOSIS: ICD-10-CM

## 2021-03-21 DIAGNOSIS — E87.6 HYPOKALEMIA: ICD-10-CM

## 2021-03-21 DIAGNOSIS — K29.00 ACUTE GASTRITIS WITHOUT HEMORRHAGE, UNSPECIFIED GASTRITIS TYPE: Primary | ICD-10-CM

## 2021-03-21 DIAGNOSIS — E87.6 HYPOPOTASSEMIA: ICD-10-CM

## 2021-03-21 PROBLEM — I10 ESSENTIAL HYPERTENSION, BENIGN: Status: ACTIVE | Noted: 2019-04-11

## 2021-03-21 PROBLEM — K59.04 CHRONIC IDIOPATHIC CONSTIPATION: Status: ACTIVE | Noted: 2020-07-02

## 2021-03-21 PROBLEM — K58.1 IRRITABLE BOWEL SYNDROME WITH CONSTIPATION: Status: ACTIVE | Noted: 2020-11-16

## 2021-03-21 PROBLEM — E78.5 HYPERLIPIDEMIA LDL GOAL <100: Status: ACTIVE | Noted: 2020-07-02

## 2021-03-21 LAB
ALBUMIN SERPL-MCNC: 4.1 G/DL (ref 3.4–5)
ALP SERPL-CCNC: 102 U/L (ref 40–150)
ALT SERPL W P-5'-P-CCNC: 28 U/L (ref 0–50)
ANION GAP SERPL CALCULATED.3IONS-SCNC: 19 MMOL/L (ref 3–14)
AST SERPL W P-5'-P-CCNC: 20 U/L (ref 0–45)
BASE DEFICIT BLDV-SCNC: 8.6 MMOL/L
BASOPHILS # BLD AUTO: 0.1 10E9/L (ref 0–0.2)
BASOPHILS NFR BLD AUTO: 0.8 %
BILIRUB SERPL-MCNC: 1.1 MG/DL (ref 0.2–1.3)
BUN SERPL-MCNC: 31 MG/DL (ref 7–30)
CALCIUM SERPL-MCNC: 9.4 MG/DL (ref 8.5–10.1)
CHLORIDE SERPL-SCNC: 96 MMOL/L (ref 94–109)
CO2 SERPL-SCNC: 19 MMOL/L (ref 20–32)
CREAT SERPL-MCNC: 0.93 MG/DL (ref 0.52–1.04)
DIFFERENTIAL METHOD BLD: ABNORMAL
EOSINOPHIL # BLD AUTO: 0 10E9/L (ref 0–0.7)
EOSINOPHIL NFR BLD AUTO: 0.4 %
ERYTHROCYTE [DISTWIDTH] IN BLOOD BY AUTOMATED COUNT: 12.7 % (ref 10–15)
GFR SERPL CREATININE-BSD FRML MDRD: 66 ML/MIN/{1.73_M2}
GLUCOSE BLDC GLUCOMTR-MCNC: 153 MG/DL (ref 70–99)
GLUCOSE BLDC GLUCOMTR-MCNC: 178 MG/DL (ref 70–99)
GLUCOSE SERPL-MCNC: 216 MG/DL (ref 70–99)
HBA1C MFR BLD: 10.6 % (ref 0–5.6)
HCO3 BLDV-SCNC: 19 MMOL/L (ref 21–28)
HCT VFR BLD AUTO: 53.4 % (ref 35–47)
HGB BLD-MCNC: 17.1 G/DL (ref 11.7–15.7)
IMM GRANULOCYTES # BLD: 0 10E9/L (ref 0–0.4)
IMM GRANULOCYTES NFR BLD: 0.3 %
LABORATORY COMMENT REPORT: NORMAL
LACTATE BLD-SCNC: 1.5 MMOL/L (ref 0.7–2)
LACTATE BLD-SCNC: 2.2 MMOL/L (ref 0.7–2)
LIPASE SERPL-CCNC: 293 U/L (ref 73–393)
LYMPHOCYTES # BLD AUTO: 1.9 10E9/L (ref 0.8–5.3)
LYMPHOCYTES NFR BLD AUTO: 24.3 %
MCH RBC QN AUTO: 27.9 PG (ref 26.5–33)
MCHC RBC AUTO-ENTMCNC: 32 G/DL (ref 31.5–36.5)
MCV RBC AUTO: 87 FL (ref 78–100)
MONOCYTES # BLD AUTO: 0.6 10E9/L (ref 0–1.3)
MONOCYTES NFR BLD AUTO: 7.4 %
NEUTROPHILS # BLD AUTO: 5.2 10E9/L (ref 1.6–8.3)
NEUTROPHILS NFR BLD AUTO: 66.8 %
NRBC # BLD AUTO: 0 10*3/UL
NRBC BLD AUTO-RTO: 0 /100
O2/TOTAL GAS SETTING VFR VENT: ABNORMAL %
PCO2 BLDV: 49 MM HG (ref 40–50)
PH BLDV: 7.21 PH (ref 7.32–7.43)
PLATELET # BLD AUTO: 255 10E9/L (ref 150–450)
PO2 BLDV: 33 MM HG (ref 25–47)
POTASSIUM SERPL-SCNC: 3 MMOL/L (ref 3.4–5.3)
POTASSIUM SERPL-SCNC: 3.4 MMOL/L (ref 3.4–5.3)
POTASSIUM SERPL-SCNC: 3.4 MMOL/L (ref 3.4–5.3)
PROT SERPL-MCNC: 8.3 G/DL (ref 6.8–8.8)
RBC # BLD AUTO: 6.12 10E12/L (ref 3.8–5.2)
SARS-COV-2 RNA RESP QL NAA+PROBE: NEGATIVE
SODIUM SERPL-SCNC: 134 MMOL/L (ref 133–144)
SPECIMEN SOURCE: NORMAL
WBC # BLD AUTO: 7.8 10E9/L (ref 4–11)

## 2021-03-21 PROCEDURE — 96365 THER/PROPH/DIAG IV INF INIT: CPT | Mod: 59 | Performed by: EMERGENCY MEDICINE

## 2021-03-21 PROCEDURE — 96375 TX/PRO/DX INJ NEW DRUG ADDON: CPT | Performed by: EMERGENCY MEDICINE

## 2021-03-21 PROCEDURE — C9113 INJ PANTOPRAZOLE SODIUM, VIA: HCPCS | Performed by: FAMILY MEDICINE

## 2021-03-21 PROCEDURE — 36415 COLL VENOUS BLD VENIPUNCTURE: CPT | Performed by: FAMILY MEDICINE

## 2021-03-21 PROCEDURE — 83605 ASSAY OF LACTIC ACID: CPT | Performed by: FAMILY MEDICINE

## 2021-03-21 PROCEDURE — 250N000013 HC RX MED GY IP 250 OP 250 PS 637: Performed by: FAMILY MEDICINE

## 2021-03-21 PROCEDURE — 84132 ASSAY OF SERUM POTASSIUM: CPT | Performed by: FAMILY MEDICINE

## 2021-03-21 PROCEDURE — 99222 1ST HOSP IP/OBS MODERATE 55: CPT | Mod: AI | Performed by: FAMILY MEDICINE

## 2021-03-21 PROCEDURE — 999N001017 HC STATISTIC GLUCOSE BY METER IP

## 2021-03-21 PROCEDURE — 250N000011 HC RX IP 250 OP 636: Performed by: EMERGENCY MEDICINE

## 2021-03-21 PROCEDURE — 93005 ELECTROCARDIOGRAM TRACING: CPT | Performed by: EMERGENCY MEDICINE

## 2021-03-21 PROCEDURE — 76705 ECHO EXAM OF ABDOMEN: CPT

## 2021-03-21 PROCEDURE — 250N000009 HC RX 250: Performed by: EMERGENCY MEDICINE

## 2021-03-21 PROCEDURE — 258N000003 HC RX IP 258 OP 636: Performed by: EMERGENCY MEDICINE

## 2021-03-21 PROCEDURE — 83036 HEMOGLOBIN GLYCOSYLATED A1C: CPT | Performed by: FAMILY MEDICINE

## 2021-03-21 PROCEDURE — 74177 CT ABD & PELVIS W/CONTRAST: CPT

## 2021-03-21 PROCEDURE — 83605 ASSAY OF LACTIC ACID: CPT | Performed by: EMERGENCY MEDICINE

## 2021-03-21 PROCEDURE — C9803 HOPD COVID-19 SPEC COLLECT: HCPCS | Performed by: EMERGENCY MEDICINE

## 2021-03-21 PROCEDURE — 83690 ASSAY OF LIPASE: CPT | Performed by: EMERGENCY MEDICINE

## 2021-03-21 PROCEDURE — 99207 PR CDG-MDM COMPONENT: MEETS LOW - DOWN CODED: CPT | Performed by: FAMILY MEDICINE

## 2021-03-21 PROCEDURE — 80053 COMPREHEN METABOLIC PANEL: CPT | Performed by: EMERGENCY MEDICINE

## 2021-03-21 PROCEDURE — 99285 EMERGENCY DEPT VISIT HI MDM: CPT | Mod: 25 | Performed by: EMERGENCY MEDICINE

## 2021-03-21 PROCEDURE — 87635 SARS-COV-2 COVID-19 AMP PRB: CPT | Performed by: EMERGENCY MEDICINE

## 2021-03-21 PROCEDURE — 99285 EMERGENCY DEPT VISIT HI MDM: CPT | Performed by: EMERGENCY MEDICINE

## 2021-03-21 PROCEDURE — 258N000003 HC RX IP 258 OP 636: Performed by: FAMILY MEDICINE

## 2021-03-21 PROCEDURE — 96361 HYDRATE IV INFUSION ADD-ON: CPT | Performed by: EMERGENCY MEDICINE

## 2021-03-21 PROCEDURE — 250N000013 HC RX MED GY IP 250 OP 250 PS 637: Performed by: PHYSICIAN ASSISTANT

## 2021-03-21 PROCEDURE — 85025 COMPLETE CBC W/AUTO DIFF WBC: CPT | Performed by: EMERGENCY MEDICINE

## 2021-03-21 PROCEDURE — 82803 BLOOD GASES ANY COMBINATION: CPT | Performed by: FAMILY MEDICINE

## 2021-03-21 PROCEDURE — 120N000001 HC R&B MED SURG/OB

## 2021-03-21 PROCEDURE — 250N000011 HC RX IP 250 OP 636: Performed by: FAMILY MEDICINE

## 2021-03-21 RX ORDER — DEXTROSE MONOHYDRATE 25 G/50ML
25-50 INJECTION, SOLUTION INTRAVENOUS
Status: DISCONTINUED | OUTPATIENT
Start: 2021-03-21 | End: 2021-03-25 | Stop reason: HOSPADM

## 2021-03-21 RX ORDER — CARVEDILOL 6.25 MG/1
6.25 TABLET ORAL 2 TIMES DAILY WITH MEALS
Status: DISCONTINUED | OUTPATIENT
Start: 2021-03-21 | End: 2021-03-25 | Stop reason: HOSPADM

## 2021-03-21 RX ORDER — ONDANSETRON 2 MG/ML
4 INJECTION INTRAMUSCULAR; INTRAVENOUS ONCE
Status: COMPLETED | OUTPATIENT
Start: 2021-03-21 | End: 2021-03-21

## 2021-03-21 RX ORDER — NALOXONE HYDROCHLORIDE 0.4 MG/ML
0.2 INJECTION, SOLUTION INTRAMUSCULAR; INTRAVENOUS; SUBCUTANEOUS
Status: DISCONTINUED | OUTPATIENT
Start: 2021-03-21 | End: 2021-03-25 | Stop reason: HOSPADM

## 2021-03-21 RX ORDER — PROCHLORPERAZINE 25 MG
25 SUPPOSITORY, RECTAL RECTAL EVERY 12 HOURS PRN
Status: DISCONTINUED | OUTPATIENT
Start: 2021-03-21 | End: 2021-03-25 | Stop reason: HOSPADM

## 2021-03-21 RX ORDER — PROCHLORPERAZINE MALEATE 5 MG
10 TABLET ORAL EVERY 6 HOURS PRN
Status: DISCONTINUED | OUTPATIENT
Start: 2021-03-21 | End: 2021-03-25 | Stop reason: HOSPADM

## 2021-03-21 RX ORDER — TOPIRAMATE 100 MG/1
100 TABLET, FILM COATED ORAL EVERY EVENING
Status: DISCONTINUED | OUTPATIENT
Start: 2021-03-21 | End: 2021-03-25 | Stop reason: HOSPADM

## 2021-03-21 RX ORDER — POTASSIUM CHLORIDE 1500 MG/1
20 TABLET, EXTENDED RELEASE ORAL ONCE
Status: COMPLETED | OUTPATIENT
Start: 2021-03-21 | End: 2021-03-21

## 2021-03-21 RX ORDER — NALOXONE HYDROCHLORIDE 0.4 MG/ML
0.4 INJECTION, SOLUTION INTRAMUSCULAR; INTRAVENOUS; SUBCUTANEOUS
Status: DISCONTINUED | OUTPATIENT
Start: 2021-03-21 | End: 2021-03-25 | Stop reason: HOSPADM

## 2021-03-21 RX ORDER — OXYCODONE HYDROCHLORIDE 5 MG/1
5 TABLET ORAL EVERY 4 HOURS PRN
Status: DISCONTINUED | OUTPATIENT
Start: 2021-03-21 | End: 2021-03-22

## 2021-03-21 RX ORDER — POTASSIUM CHLORIDE 7.45 MG/ML
10 INJECTION INTRAVENOUS ONCE
Status: COMPLETED | OUTPATIENT
Start: 2021-03-21 | End: 2021-03-21

## 2021-03-21 RX ORDER — DOCUSATE SODIUM 100 MG/1
100 CAPSULE, LIQUID FILLED ORAL 2 TIMES DAILY PRN
Status: DISCONTINUED | OUTPATIENT
Start: 2021-03-21 | End: 2021-03-25 | Stop reason: HOSPADM

## 2021-03-21 RX ORDER — POLYETHYLENE GLYCOL 3350 17 G/17G
17 POWDER, FOR SOLUTION ORAL DAILY
Status: DISCONTINUED | OUTPATIENT
Start: 2021-03-21 | End: 2021-03-25 | Stop reason: HOSPADM

## 2021-03-21 RX ORDER — ACETAMINOPHEN 325 MG/1
650 TABLET ORAL EVERY 4 HOURS PRN
Status: DISCONTINUED | OUTPATIENT
Start: 2021-03-21 | End: 2021-03-25 | Stop reason: HOSPADM

## 2021-03-21 RX ORDER — HYDROMORPHONE HYDROCHLORIDE 1 MG/ML
0.5 INJECTION, SOLUTION INTRAMUSCULAR; INTRAVENOUS; SUBCUTANEOUS ONCE
Status: COMPLETED | OUTPATIENT
Start: 2021-03-21 | End: 2021-03-21

## 2021-03-21 RX ORDER — NICOTINE POLACRILEX 4 MG
15-30 LOZENGE BUCCAL
Status: DISCONTINUED | OUTPATIENT
Start: 2021-03-21 | End: 2021-03-25 | Stop reason: HOSPADM

## 2021-03-21 RX ORDER — DEXTROSE MONOHYDRATE 25 G/50ML
25-50 INJECTION, SOLUTION INTRAVENOUS
Status: DISCONTINUED | OUTPATIENT
Start: 2021-03-21 | End: 2021-03-21

## 2021-03-21 RX ORDER — ROSUVASTATIN CALCIUM 20 MG/1
20 TABLET, COATED ORAL EVERY EVENING
Status: DISCONTINUED | OUTPATIENT
Start: 2021-03-21 | End: 2021-03-25 | Stop reason: HOSPADM

## 2021-03-21 RX ORDER — BISACODYL 10 MG
10 SUPPOSITORY, RECTAL RECTAL DAILY PRN
Status: DISCONTINUED | OUTPATIENT
Start: 2021-03-21 | End: 2021-03-24

## 2021-03-21 RX ORDER — NICOTINE POLACRILEX 4 MG
15-30 LOZENGE BUCCAL
Status: DISCONTINUED | OUTPATIENT
Start: 2021-03-21 | End: 2021-03-21

## 2021-03-21 RX ORDER — ONDANSETRON 4 MG/1
4 TABLET, ORALLY DISINTEGRATING ORAL EVERY 6 HOURS PRN
Status: DISCONTINUED | OUTPATIENT
Start: 2021-03-21 | End: 2021-03-25 | Stop reason: HOSPADM

## 2021-03-21 RX ORDER — ONDANSETRON 2 MG/ML
4 INJECTION INTRAMUSCULAR; INTRAVENOUS EVERY 6 HOURS PRN
Status: DISCONTINUED | OUTPATIENT
Start: 2021-03-21 | End: 2021-03-22

## 2021-03-21 RX ORDER — IOPAMIDOL 755 MG/ML
72 INJECTION, SOLUTION INTRAVASCULAR ONCE
Status: COMPLETED | OUTPATIENT
Start: 2021-03-21 | End: 2021-03-21

## 2021-03-21 RX ORDER — SENNOSIDES 8.6 MG
1 TABLET ORAL 2 TIMES DAILY PRN
Status: DISCONTINUED | OUTPATIENT
Start: 2021-03-21 | End: 2021-03-25 | Stop reason: HOSPADM

## 2021-03-21 RX ORDER — LOSARTAN POTASSIUM 50 MG/1
50 TABLET ORAL DAILY
Status: DISCONTINUED | OUTPATIENT
Start: 2021-03-21 | End: 2021-03-25 | Stop reason: HOSPADM

## 2021-03-21 RX ORDER — SODIUM CHLORIDE, SODIUM LACTATE, POTASSIUM CHLORIDE, CALCIUM CHLORIDE 600; 310; 30; 20 MG/100ML; MG/100ML; MG/100ML; MG/100ML
INJECTION, SOLUTION INTRAVENOUS CONTINUOUS
Status: DISCONTINUED | OUTPATIENT
Start: 2021-03-21 | End: 2021-03-23

## 2021-03-21 RX ORDER — TELMISARTAN 40 MG/1
40 TABLET ORAL DAILY
Status: DISCONTINUED | OUTPATIENT
Start: 2021-03-21 | End: 2021-03-21 | Stop reason: CLARIF

## 2021-03-21 RX ORDER — HYDROMORPHONE HYDROCHLORIDE 1 MG/ML
0.3 INJECTION, SOLUTION INTRAMUSCULAR; INTRAVENOUS; SUBCUTANEOUS EVERY 4 HOURS PRN
Status: DISCONTINUED | OUTPATIENT
Start: 2021-03-21 | End: 2021-03-22

## 2021-03-21 RX ADMIN — SODIUM CHLORIDE, POTASSIUM CHLORIDE, SODIUM LACTATE AND CALCIUM CHLORIDE: 600; 310; 30; 20 INJECTION, SOLUTION INTRAVENOUS at 17:32

## 2021-03-21 RX ADMIN — IOPAMIDOL 72 ML: 755 INJECTION, SOLUTION INTRAVENOUS at 13:22

## 2021-03-21 RX ADMIN — PANTOPRAZOLE SODIUM 40 MG: 40 INJECTION, POWDER, FOR SOLUTION INTRAVENOUS at 20:33

## 2021-03-21 RX ADMIN — ONDANSETRON 4 MG: 2 INJECTION INTRAMUSCULAR; INTRAVENOUS at 12:30

## 2021-03-21 RX ADMIN — FAMOTIDINE 20 MG: 20 INJECTION, SOLUTION INTRAVENOUS at 18:45

## 2021-03-21 RX ADMIN — POTASSIUM CHLORIDE 10 MEQ: 7.46 INJECTION, SOLUTION INTRAVENOUS at 14:11

## 2021-03-21 RX ADMIN — SODIUM CHLORIDE 1000 ML: 9 INJECTION, SOLUTION INTRAVENOUS at 12:29

## 2021-03-21 RX ADMIN — SODIUM CHLORIDE 58 ML: 9 INJECTION, SOLUTION INTRAVENOUS at 13:22

## 2021-03-21 RX ADMIN — HYDROMORPHONE HYDROCHLORIDE 0.5 MG: 1 INJECTION, SOLUTION INTRAMUSCULAR; INTRAVENOUS; SUBCUTANEOUS at 12:45

## 2021-03-21 RX ADMIN — ONDANSETRON 4 MG: 4 TABLET, ORALLY DISINTEGRATING ORAL at 18:55

## 2021-03-21 RX ADMIN — MICONAZOLE NITRATE: 20 POWDER TOPICAL at 20:34

## 2021-03-21 RX ADMIN — POTASSIUM CHLORIDE 20 MEQ: 20 TABLET, EXTENDED RELEASE ORAL at 18:48

## 2021-03-21 RX ADMIN — PROMETHAZINE HYDROCHLORIDE 25 MG: 25 INJECTION INTRAMUSCULAR; INTRAVENOUS at 15:35

## 2021-03-21 ASSESSMENT — MIFFLIN-ST. JEOR: SCORE: 1114.5

## 2021-03-21 ASSESSMENT — ACTIVITIES OF DAILY LIVING (ADL): ADLS_ACUITY_SCORE: 17

## 2021-03-21 NOTE — PLAN OF CARE
[place] [facility] ADMISSION NOTE    Patient admitted to room 2304 at approximately 1620 via cart from emergency room. Patient was accompanied by transport tech.     Verbal SBAR report received from Debbie MARSH prior to patient arrival.     Patient ambulated to bed with one assist and was unsteady with ambulation to the BATHROOM per NST. Patient alert and oriented X 4. Pain is controlled without any medications.  . Admission vital signs: Blood pressure 121/69, pulse 97, temperature 97.7  F (36.5  C), temperature source Oral, resp. rate 18, height 1.524 m (5'), weight 62.3 kg (137 lb 5.6 oz), SpO2 97 %, not currently breastfeeding. Patient was oriented to plan of care, call light, bed controls, tv, telephone, bathroom, and visiting hours.     Risk Assessment    The following safety risks were identified during admission: fall. Yellow risk band applied: YES.     Skin Initial Assessment    This writer admitted this patient and completed a full skin assessment and Osvaldo score in the Adult PCS flowsheet. Appropriate interventions initiated as needed.     Secondary skin check completed by Ying SCHROEDER RN.    Osvaldo Risk Assessment  Sensory Perception: 4-->no impairment  Moisture: 4-->rarely moist  Activity: 3-->walks occasionally  Mobility: 4-->no limitation  Nutrition: 2-->probably inadequate  Friction and Shear: 3-->no apparent problem  Osvaldo Score: 20  Nutrition Interventions: Nutrition consult, Maintain adequate hydration  Mattress: Standard Hospital Mattress (Foam)  Bed Frame: Standard width and length  Informed Refusal Interventions: No    Education    Patient has a [place] to Observation order: No  Observation education completed and documented: N/A    Patient has bilateral groin redness/moisture; message sent to on-call provider requesting Miconazole powder.    Patient has a clear liquid diet ordered and every 4 hour blood sugars per MD order.  Blood sugar was 178 at 1646; patient declined to take novolog at that time.  Patient  "only taking sips of water.  Patient reports that \"when I took novolog over 10 years ago I had convulsions.\"  Spoke with Dr. Osborn regarding this and there are no changes in the orders.  Dr. Osborn stated \"that could have been caused by low blood sugars.\"    Patient's weight today was 62.3 kg and when patient was seen in clinic on 3/15/21 her weight was 67.2 kg.  Nutrition consult placed.      Elvie Avery RN      "

## 2021-03-21 NOTE — ED NOTES
DATE:  3/21/2021   TIME OF RECEIPT FROM LAB:  1218  LAB TEST:  lactic  LAB VALUE:  2.2  RESULTS GIVEN WITH READ-BACK TO (PROVIDER):  Eddie Mendez  TIME LAB VALUE REPORTED TO PROVIDER:   1230  .anna Carranza RN

## 2021-03-21 NOTE — ED PROVIDER NOTES
History     Chief Complaint   Patient presents with     Abdominal Pain     Pt has IBS and states that she hasn't eaten in a week and has not had a bm in 2 weeks. Pt also states that she can't keep anything down and throws up everything.      HPI  Lorrie Blake is a 60 year old female who presents with abdominal pain waxing and waning for the past week.  Describes vomiting for the past couple of days anything she eats or drinks comes back up.  No bowel movement reported for 2 weeks.  History of IBS.  No prior abdominal surgery.  Saw primary care last week, CT scan abdomen pelvis accomplished on 3/18 results reviewed in epic.  Denies associated fever.  Denies urinary symptoms.  Last colonoscopy 2011.    Allergies:  Allergies   Allergen Reactions     Ace Inhibitors Itching     Codeine Hives     Lisinopril Nausea and Cough       Problem List:    Patient Active Problem List    Diagnosis Date Noted     Abdominal pain, epigastric 03/21/2021     Priority: Medium     Hypokalemia 03/21/2021     Priority: Medium     Lactic acidosis 03/21/2021     Priority: Medium     Nausea and vomiting, intractability of vomiting not specified, unspecified vomiting type 03/21/2021     Priority: Medium     Irritable bowel syndrome with constipation 11/16/2020     Priority: Medium     Financial difficulties 07/02/2020     Priority: Medium     Hyperlipidemia LDL goal <100 07/02/2020     Priority: Medium     Chronic idiopathic constipation 07/02/2020     Priority: Medium     Essential hypertension, benign 04/11/2019     Priority: Medium     Gastroesophageal reflux disease, esophagitis presence not specified 04/11/2019     Priority: Medium     IMO Regulatory Load OCT 2020       Overweight (BMI 25.0-29.9) 01/23/2018     Priority: Medium     Fatigue, unspecified type 01/23/2018     Priority: Medium     Type 2 diabetes mellitus with mild nonproliferative retinopathy without macular edema, without long-term current use of insulin, unspecified  laterality (H) 01/23/2018     Priority: Medium     Non compliance with medical treatment 02/25/2014     Priority: Medium        Past Medical History:    History reviewed. No pertinent past medical history.    Past Surgical History:    History reviewed. No pertinent surgical history.    Family History:    History reviewed. No pertinent family history.    Social History:  Marital Status:  Legally  [3]  Social History     Tobacco Use     Smoking status: Never Smoker     Smokeless tobacco: Never Used   Substance Use Topics     Alcohol use: No     Frequency: Never     Drug use: No        Medications:    alcohol swab prep pads  blood glucose (NO BRAND SPECIFIED) test strip  blood glucose calibration (NO BRAND SPECIFIED) solution  blood glucose monitoring (NO BRAND SPECIFIED) meter device kit  carvedilol (COREG) 6.25 MG tablet  Chlorpheniramine Maleate (ALLERGY PO)  Cyanocobalamin (VITAMIN B-12 PO)  diphenhydrAMINE-acetaminophen (TYLENOL PM)  MG tablet  docusate sodium (COLACE) 100 MG capsule  dulaglutide (TRULICITY) 0.75 MG/0.5ML pen  insulin pen needle (31G X 5 MM) 31G X 5 MM miscellaneous  liraglutide (VICTOZA) 18 MG/3ML solution  liraglutide (VICTOZA) 18 MG/3ML solution  Melatonin 10 MG CAPS  metFORMIN (GLUCOPHAGE) 1000 MG tablet  ondansetron (ZOFRAN-ODT) 4 MG ODT tab  pantoprazole (PROTONIX) 20 MG EC tablet  plecanatide (TRULANCE) 3 MG tablet  polyethylene glycol (MIRALAX) 17 GM/Dose powder  rosuvastatin (CRESTOR) 20 MG tablet  senna (SENOKOT) 8.6 MG tablet  telmisartan (MICARDIS) 40 MG tablet  thin (NO BRAND SPECIFIED) lancets  topiramate (TOPAMAX) 100 MG tablet          Review of Systems  All other systems reviewed and are negative.    Physical Exam   BP: 112/75  Pulse: 115  Temp: 97.6  F (36.4  C)  Resp: 18  Weight: 67.1 kg (148 lb)  SpO2: 98 %      Physical Exam  Nontoxic appearing no respiratory distress alert and oriented ×3  Head atraumatic normocephalic   Neck supple full active painless range  of motion  Lungs clear to auscultation  Heart regular no murmur  Abdomen soft moderate diffuse tenderness, mild distention, tympanic, no guarding or rebound  Strength and sensation grossly intact throughout the extremities, gait and station normal  Speech is fluent, good eye contact, thought processes are rational  Lower extremities without swelling, redness or tenderness  Pedal pulses symmetrical and strong    ED Course        Procedures          Results for orders placed or performed during the hospital encounter of 03/21/21   CT Abdomen Pelvis w Contrast     Status: None    Narrative    CT ABDOMEN AND PELVIS WITH CONTRAST 3/21/2021 1:32 PM    CLINICAL HISTORY: Abdominal distension; Bowel obstruction suspected.    TECHNIQUE: CT scan of the abdomen and pelvis was performed following  injection of IV contrast. Multiplanar reformats were obtained. Dose  reduction techniques were used.    CONTRAST: 72mL Isovue-370    COMPARISON: March 18, 2021    FINDINGS:   LOWER CHEST: No infiltrates or effusions.    HEPATOBILIARY: No significant mass or bile duct dilatation. Sludge vs.  stones in the gallbladder. No cholecystitis.     PANCREAS: No significant mass, duct dilatation, or inflammatory  change.    SPLEEN: Complex and heterogeneous cystic lesion in the spleen again  noted with additional smaller cystic lesions, stable.    ADRENAL GLANDS: No significant nodules.    KIDNEYS/BLADDER: No significant mass, stones, or hydronephrosis.    BOWEL: No obstruction or inflammatory change.    PELVIC ORGANS: No pelvic masses.    ADDITIONAL FINDINGS: No ascites.    MUSCULOSKELETAL: Survey of the visualized bony structures demonstrates  no destructive bony lesions.      Impression    IMPRESSION:   No acute process demonstrated in the abdomen and pelvis.    ANTONIA NUÑEZ MD   CBC with platelets differential     Status: Abnormal   Result Value Ref Range    WBC 7.8 4.0 - 11.0 10e9/L    RBC Count 6.12 (H) 3.8 - 5.2 10e12/L    Hemoglobin 17.1  (H) 11.7 - 15.7 g/dL    Hematocrit 53.4 (H) 35.0 - 47.0 %    MCV 87 78 - 100 fl    MCH 27.9 26.5 - 33.0 pg    MCHC 32.0 31.5 - 36.5 g/dL    RDW 12.7 10.0 - 15.0 %    Platelet Count 255 150 - 450 10e9/L    Diff Method Automated Method     % Neutrophils 66.8 %    % Lymphocytes 24.3 %    % Monocytes 7.4 %    % Eosinophils 0.4 %    % Basophils 0.8 %    % Immature Granulocytes 0.3 %    Nucleated RBCs 0 0 /100    Absolute Neutrophil 5.2 1.6 - 8.3 10e9/L    Absolute Lymphocytes 1.9 0.8 - 5.3 10e9/L    Absolute Monocytes 0.6 0.0 - 1.3 10e9/L    Absolute Eosinophils 0.0 0.0 - 0.7 10e9/L    Absolute Basophils 0.1 0.0 - 0.2 10e9/L    Abs Immature Granulocytes 0.0 0 - 0.4 10e9/L    Absolute Nucleated RBC 0.0    Comprehensive metabolic panel     Status: Abnormal   Result Value Ref Range    Sodium 134 133 - 144 mmol/L    Potassium 3.0 (L) 3.4 - 5.3 mmol/L    Chloride 96 94 - 109 mmol/L    Carbon Dioxide 19 (L) 20 - 32 mmol/L    Anion Gap 19 (H) 3 - 14 mmol/L    Glucose 216 (H) 70 - 99 mg/dL    Urea Nitrogen 31 (H) 7 - 30 mg/dL    Creatinine 0.93 0.52 - 1.04 mg/dL    GFR Estimate 66 >60 mL/min/[1.73_m2]    GFR Estimate If Black 77 >60 mL/min/[1.73_m2]    Calcium 9.4 8.5 - 10.1 mg/dL    Bilirubin Total 1.1 0.2 - 1.3 mg/dL    Albumin 4.1 3.4 - 5.0 g/dL    Protein Total 8.3 6.8 - 8.8 g/dL    Alkaline Phosphatase 102 40 - 150 U/L    ALT 28 0 - 50 U/L    AST 20 0 - 45 U/L   Lactic acid whole blood     Status: Abnormal   Result Value Ref Range    Lactic Acid 2.2 (H) 0.7 - 2.0 mmol/L   Lipase     Status: None   Result Value Ref Range    Lipase 293 73 - 393 U/L              Results for orders placed or performed during the hospital encounter of 03/21/21 (from the past 24 hour(s))   CBC with platelets differential   Result Value Ref Range    WBC 7.8 4.0 - 11.0 10e9/L    RBC Count 6.12 (H) 3.8 - 5.2 10e12/L    Hemoglobin 17.1 (H) 11.7 - 15.7 g/dL    Hematocrit 53.4 (H) 35.0 - 47.0 %    MCV 87 78 - 100 fl    MCH 27.9 26.5 - 33.0 pg    MCHC  32.0 31.5 - 36.5 g/dL    RDW 12.7 10.0 - 15.0 %    Platelet Count 255 150 - 450 10e9/L    Diff Method Automated Method     % Neutrophils 66.8 %    % Lymphocytes 24.3 %    % Monocytes 7.4 %    % Eosinophils 0.4 %    % Basophils 0.8 %    % Immature Granulocytes 0.3 %    Nucleated RBCs 0 0 /100    Absolute Neutrophil 5.2 1.6 - 8.3 10e9/L    Absolute Lymphocytes 1.9 0.8 - 5.3 10e9/L    Absolute Monocytes 0.6 0.0 - 1.3 10e9/L    Absolute Eosinophils 0.0 0.0 - 0.7 10e9/L    Absolute Basophils 0.1 0.0 - 0.2 10e9/L    Abs Immature Granulocytes 0.0 0 - 0.4 10e9/L    Absolute Nucleated RBC 0.0    Comprehensive metabolic panel   Result Value Ref Range    Sodium 134 133 - 144 mmol/L    Potassium 3.0 (L) 3.4 - 5.3 mmol/L    Chloride 96 94 - 109 mmol/L    Carbon Dioxide 19 (L) 20 - 32 mmol/L    Anion Gap 19 (H) 3 - 14 mmol/L    Glucose 216 (H) 70 - 99 mg/dL    Urea Nitrogen 31 (H) 7 - 30 mg/dL    Creatinine 0.93 0.52 - 1.04 mg/dL    GFR Estimate 66 >60 mL/min/[1.73_m2]    GFR Estimate If Black 77 >60 mL/min/[1.73_m2]    Calcium 9.4 8.5 - 10.1 mg/dL    Bilirubin Total 1.1 0.2 - 1.3 mg/dL    Albumin 4.1 3.4 - 5.0 g/dL    Protein Total 8.3 6.8 - 8.8 g/dL    Alkaline Phosphatase 102 40 - 150 U/L    ALT 28 0 - 50 U/L    AST 20 0 - 45 U/L   Lactic acid whole blood   Result Value Ref Range    Lactic Acid 2.2 (H) 0.7 - 2.0 mmol/L   Lipase   Result Value Ref Range    Lipase 293 73 - 393 U/L   CT Abdomen Pelvis w Contrast    Narrative    CT ABDOMEN AND PELVIS WITH CONTRAST 3/21/2021 1:32 PM    CLINICAL HISTORY: Abdominal distension; Bowel obstruction suspected.    TECHNIQUE: CT scan of the abdomen and pelvis was performed following  injection of IV contrast. Multiplanar reformats were obtained. Dose  reduction techniques were used.    CONTRAST: 72mL Isovue-370    COMPARISON: March 18, 2021    FINDINGS:   LOWER CHEST: No infiltrates or effusions.    HEPATOBILIARY: No significant mass or bile duct dilatation. Sludge vs.  stones in the  gallbladder. No cholecystitis.     PANCREAS: No significant mass, duct dilatation, or inflammatory  change.    SPLEEN: Complex and heterogeneous cystic lesion in the spleen again  noted with additional smaller cystic lesions, stable.    ADRENAL GLANDS: No significant nodules.    KIDNEYS/BLADDER: No significant mass, stones, or hydronephrosis.    BOWEL: No obstruction or inflammatory change.    PELVIC ORGANS: No pelvic masses.    ADDITIONAL FINDINGS: No ascites.    MUSCULOSKELETAL: Survey of the visualized bony structures demonstrates  no destructive bony lesions.      Impression    IMPRESSION:   No acute process demonstrated in the abdomen and pelvis.    ANTONIA NUÑEZ MD       Medications   potassium chloride 10 mEq in 100 mL sterile water intermittent infusion (premix) (10 mEq Intravenous New Bag 3/21/21 1411)   promethazine (PHENERGAN) 25 mg in sodium chloride 0.9 % 50 mL intermittent infusion (has no administration in time range)   ondansetron (ZOFRAN) injection 4 mg (4 mg Intravenous Given 3/21/21 1230)   HYDROmorphone (PF) (DILAUDID) injection 0.5 mg (0.5 mg Intravenous Given 3/21/21 1245)   0.9% sodium chloride BOLUS (1,000 mLs Intravenous New Bag 3/21/21 1229)   iopamidol (ISOVUE-370) solution 72 mL (72 mLs Intravenous Given 3/21/21 1322)   sodium chloride 0.9 % bag 500mL for CT scan flush use (58 mLs Intravenous Given 3/21/21 1322)       Assessments & Plan (with Medical Decision Making)  60-year-old female presents with abdominal pain vomiting no bowel movements details per HPI.  Diffuse abdominal tenderness on initial exam, repeat exam right upper quadrant epigastric tenderness.  CT scan abdomen pelvis significant for sludge dependent stones in the gallbladder, normal liver testing, CBC normal, lactate elevated slightly at 2.2 which I believe is secondary to dehydration, also has hypokalemia associated with vomiting and dehydration.  She is treated with hydromorphone, ondansetron, promethazine, fluid bolus.   She will be admitted for abdominal pain, vomiting, dehydration, lactic acidosis, hypokalemia.  Right upper quadrant ultrasound is ordered and pending.  Reviewed with  accepts for hospitalist service, also reviewed with Dr. Tovar general surgery who will consult on case.  Working diagnosis plan results study reviewed with patient expressed understanding and agreement.     I have reviewed the nursing notes.    I have reviewed the findings, diagnosis, plan and need for follow up with the patient.          New Prescriptions    No medications on file       Final diagnoses:   Abdominal pain, epigastric   Lactic acidosis   Nausea and vomiting, intractability of vomiting not specified, unspecified vomiting type   Hypokalemia       3/21/2021   [facility] EMERGENCY DEPT     Eddie Mendez MD  03/21/21 [id number]

## 2021-03-21 NOTE — PROGRESS NOTES
Skin affirmation note    Admitting nurse completed full skin assessment, Osvaldo score and Osvaldo interventions. This writer agrees with the initial skin assessment findings.

## 2021-03-21 NOTE — H&P
[facility] History and Physical    Lorrie Blake MRN# [id number]   Age: 60 year old YOB: 1960     Date of Admission:  3/21/2021      Primary care provider: Mary Kay Pollard          Assessment and Plan:   Assessment & Plan     61 yo female with history of chronic abdominal pain as below who presents with 1 week of nausea and vomiting.  Pleasant but very challenging historian.          Nausea and vomiting, intractability of vomiting not specified, unspecified vomiting type  3/21/2021 -- sounds like this is her primary new symptom.  Hard historian but sounds like it's been about 1 week with emesis a few times per day and no oral intake other than sips of fluids as a result.  Not taking any home medications as a result either.  CT negative other than some gallbladder sludging.  Assessing that as below.  Nothing for obstruction here.  Could be gastroenteritis but no diarrhea.  Could also be uncontrolled GERD with patient not taking her medications - treat this as below.  For now will rehydrate with LR at 125/h, evaluate abdominal pain as below.  Will start on zofran and compazine prn - likely try to transition to ODT zofran from IV tomorrow.  Ok for clears as tolerated tonight, but NPO at midnight for work-up of abdominal pain as below.            Abdominal pain, epigastric with history of Chronic abdominal pain     Irritable bowel syndrome  3/21/2021 -- Has had abdominal pain since childhood, hard to compare current symptoms to baseline, sounds like somewhat worse with vomiting recently and perhaps more in the right upper quadrant but really hard to tell how much of this is new vs chronic.  Labs normal and CT negative other than gallbladder sludging as above.  Getting US of gallbladder.  Follow labs.  Dilaudid prn for pain, hope to transition to oral tylenol with oxycodone if needed tomorrow (ordered).  If no clear cholecystitis on US will likely check HIDA scan.  Surgery will see  tomorrow, if HIDA negative, will consider moving up planned EGD.    Continue home trulance for IBS.      Chronic constipation  3/21/2021 -- reports last bowel movement 2 weeks ago.  Will attempt to continue home docusate, miralax and senna as tolerated with dulcolax suppository ordered prn as well.  Unclear if this is the cause of her vomiting and abdominal pain or the result of not eating for the past week on top of her baseline constipation, but didn't appear to have a large amount of stool on CT.       Gastroesophageal reflux disease, esophagitis presence not specified  Eosinophilic esophagitis - Seen on outside EGD in 2016.    Has been having worsened GERD symptoms, which could be the cause of her nausea, vomiting and abdominal pain as above - will change home protonix to IV twice daily and add IV famotidine.     Eosinophilic esophagitis could also be the primary etiology here - will start with aggressive treatment of her GERD. Follow, consider EGD as above.    Elevated lactic/anion gap   suspect due to dehydration.  Certainly nothing here for sepsis, and does not have pain much beyond baseline with benign exam - does not look like ischemic bowel.  Recheck of lactic with VBG after bolus in ER showed improved lactic, mild acidosis but suspect will resolve overnight with rehydration.  Recheck in AM.          Hypokalemia  3/21/2021 -- due to vomiting and poor intake past week, started on replacement protocol.         Type 2 diabetes mellitus with mild nonproliferative retinopathy without macular edema, without long-term current use of insulin, unspecified laterality (H)  A1c 11.8 in 1/21 - rechecking A1c.  Home home trulicity, metformin and victoza.  Placing on high dose NPO sliding scale insulin for now.         Essential hypertension, benign  Blood pressure stable, continue home lermisartan or equivalent       Hyperlipidemia LDL goal <100  Continue home crestor     Cystic lesions spleen  Stable per radiology from  "recent CT.      Asymptomatic COVID screen pending.         Prophylaxis  mechaincal for now    Lines  PIV    Diet  Liquids, then NPO at midnight.          Disposition  Anticipate at least 2-3 days inpatient.                Chief Complaint:   Nausea and vomiting   History is obtained from the patient          History of Present Illness:   This patient is a 60 year old  female with a significant past medical history of chronic abdominal pain \"ever since I was a kid\" with diagnoses of chronic constipation and IBS who presents with nausea, vomiting and abdominal pain.  Patient is unfortunately a very poor and inconsistent historian and is quite vague in many of her responses, but says she's had abdominal pain ever since childhood.  About 1 week ago says she developed nausea and vomiting after having lunch with a friend and that has persisted for the past week.  Says she 'hasn't been able to keep anything down\" and hasn't been taking any of her medications.  Tried taking some ODT zofran but says she then drunk water afterwards and vomited.  No fever or chills.  Hard to assess her pain compared to baseline.  Says her pain is usually in the upper mid abdomen and radiates down her mid abdomen, but says she's also had more pain in the right upper quadrant for \"a while\", can't clarify further.  Hard to tell if the pain is worse than usual or not, but the vomiting is clearly a change from baseline.  Says she hasn't been taking any of her medications for the past week or so due to the vomiting.  Says last bowel movement was about 2 weeks ago.  No recent changes in her medications otherwise.    No fever, no chills, no dyspnea.  No diarrhea.  No black or bloody stools.  No known sick contacts.      Denies any tobacco, alcohol or illicit drug use.               Past Medical History:   I have reviewed this patient's past medical history.  Patient Active Problem List    Diagnosis Date Noted     Abdominal pain, epigastric " 03/21/2021     Priority: Medium     Hypokalemia 03/21/2021     Priority: Medium     Lactic acidosis 03/21/2021     Priority: Medium     Nausea and vomiting, intractability of vomiting not specified, unspecified vomiting type 03/21/2021     Priority: Medium     Irritable bowel syndrome with constipation 11/16/2020     Priority: Medium     Financial difficulties 07/02/2020     Priority: Medium     Hyperlipidemia LDL goal <100 07/02/2020     Priority: Medium     Chronic idiopathic constipation 07/02/2020     Priority: Medium     Essential hypertension, benign 04/11/2019     Priority: Medium     Gastroesophageal reflux disease, esophagitis presence not specified 04/11/2019     Priority: Medium     IMO Regulatory Load OCT 2020       Overweight (BMI 25.0-29.9) 01/23/2018     Priority: Medium     Fatigue, unspecified type 01/23/2018     Priority: Medium     Type 2 diabetes mellitus with mild nonproliferative retinopathy without macular edema, without long-term current use of insulin, unspecified laterality (H) 01/23/2018     Priority: Medium     Non compliance with medical treatment 02/25/2014     Priority: Medium              Past Surgical History:   I have reviewed this patient's past surgical history   History reviewed. No pertinent surgical history.          Social History:   I have reviewed this patient's social history   Social History     Tobacco Use     Smoking status: Never Smoker     Smokeless tobacco: Never Used   Substance Use Topics     Alcohol use: No     Frequency: Never             Family History:   I have reviewed this patient's family history   History reviewed. No pertinent family history.          Immunizations:   Immunizations are current          Allergies:     Allergies   Allergen Reactions     Ace Inhibitors Itching     Codeine Hives     Lisinopril Nausea and Cough             Medications:   No current facility-administered medications on file prior to encounter.   alcohol swab prep pads, Use to  swab area of injection/avi as directed.  blood glucose (NO BRAND SPECIFIED) test strip, Use to test blood sugar 4 times daily or as directed. To accompany: Blood Glucose Monitor Brands: per insurance.  blood glucose calibration (NO BRAND SPECIFIED) solution, To accompany: Blood Glucose Monitor Brands: per insurance.  blood glucose monitoring (NO BRAND SPECIFIED) meter device kit, Use to test blood sugar 4 times daily or as directed. Preferred blood glucose meter OR supplies to accompany: Blood Glucose Monitor Brands: per insurance.  carvedilol (COREG) 6.25 MG tablet, Take 1 tablet (6.25 mg) by mouth 2 times daily (with meals) Due for a recheck  Chlorpheniramine Maleate (ALLERGY PO), Take by mouth as needed  Cyanocobalamin (VITAMIN B-12 PO), Take by mouth daily  diphenhydrAMINE-acetaminophen (TYLENOL PM)  MG tablet, Take 1 tablet by mouth nightly as needed for sleep  docusate sodium (COLACE) 100 MG capsule, Take 100 mg by mouth 2 times daily as needed for constipation  dulaglutide (TRULICITY) 0.75 MG/0.5ML pen, Inject 0.75 mg Subcutaneous every 7 days  insulin pen needle (31G X 5 MM) 31G X 5 MM miscellaneous, Use with Victoza daily as directed.  liraglutide (VICTOZA) 18 MG/3ML solution, Inject 0.6 mg Subcutaneous daily  liraglutide (VICTOZA) 18 MG/3ML solution, Inject 1.2 mg Subcutaneous daily 0.6 mg SC qd x1wk, then 1.2 mg SC qd  Melatonin 10 MG CAPS, Take by mouth daily  metFORMIN (GLUCOPHAGE) 1000 MG tablet, Take 1 tablet (1,000 mg) by mouth 2 times daily (with meals)  ondansetron (ZOFRAN-ODT) 4 MG ODT tab, Take 1 tablet (4 mg) by mouth every 8 hours as needed for nausea  pantoprazole (PROTONIX) 20 MG EC tablet, Take 1 tablet (20 mg) by mouth 2 times daily Due for a recheck  plecanatide (TRULANCE) 3 MG tablet, Take 1 tablet (3 mg) by mouth daily  polyethylene glycol (MIRALAX) 17 GM/Dose powder, Take 17 g (1 capful) by mouth daily  rosuvastatin (CRESTOR) 20 MG tablet, Take 1 tablet (20 mg) by mouth  daily  senna (SENOKOT) 8.6 MG tablet, Take 1 tablet by mouth as needed for constipation  telmisartan (MICARDIS) 40 MG tablet, Take 1 tablet (40 mg) by mouth daily  thin (NO BRAND SPECIFIED) lancets, Use with lanceting device. To accompany: Blood Glucose Monitor Brands: per insurance.  topiramate (TOPAMAX) 100 MG tablet, Take 1 tablet (100 mg) by mouth daily             Review of Systems:    ROS: 10 point ROS neg other than the symptoms noted above in the HPI.             Physical Exam:   Blood pressure 124/83, pulse 96, temperature 97.6  F (36.4  C), temperature source Oral, resp. rate 18, weight 67.1 kg (148 lb), SpO2 91 %, not currently breastfeeding.  Temperatures:  Current - Temp: 97.6  F (36.4  C); Max - Temp  Av.6  F (36.4  C)  Min: 97.6  F (36.4  C)  Max: 97.6  F (36.4  C)  Respiration range: Resp  Av  Min: 18  Max: 18  Pulse range: Pulse  Av.5  Min: 96  Max: 115  Blood pressure range: Systolic (24hrs), Av , Min:112 , Max:124   ; Diastolic (24hrs), Av, Min:75, Max:83    Pulse oximetry range: SpO2  Av.5 %  Min: 91 %  Max: 98 %  No intake or output data in the 24 hours ending 21 1402  EXAM:  General: awake and alert, somewhat emotional but no suicidal ideation or agitation, oriented x 3  Head: normocephalic  Neck: unremarkable, no lymphadenopathy   HEENT: oropharynx pink and moist    Heart: Regular rate and rhythm, no murmurs, rubs, or gallops  Lungs: clear to auscultation bilaterally with good air movement throughout  Abdomen: difficult exam - some mild tenderness with palpation with stethoscope in mid-right abdomen, otherwise non-tender even with pretty deep palpation with stethoscope, no guarding or rebound with this, then is jumpy and moaning even with slight palpation with hands.  Normal bowel sounds, non-distended.  Abdomen soft.  Overall, benign exam.    Extremities: no edema in lower extremities   Skin unremarkable.             Data:     Results for orders placed or  performed during the hospital encounter of 03/21/21 (from the past 24 hour(s))   CBC with platelets differential   Result Value Ref Range    WBC 7.8 4.0 - 11.0 10e9/L    RBC Count 6.12 (H) 3.8 - 5.2 10e12/L    Hemoglobin 17.1 (H) 11.7 - 15.7 g/dL    Hematocrit 53.4 (H) 35.0 - 47.0 %    MCV 87 78 - 100 fl    MCH 27.9 26.5 - 33.0 pg    MCHC 32.0 31.5 - 36.5 g/dL    RDW 12.7 10.0 - 15.0 %    Platelet Count 255 150 - 450 10e9/L    Diff Method Automated Method     % Neutrophils 66.8 %    % Lymphocytes 24.3 %    % Monocytes 7.4 %    % Eosinophils 0.4 %    % Basophils 0.8 %    % Immature Granulocytes 0.3 %    Nucleated RBCs 0 0 /100    Absolute Neutrophil 5.2 1.6 - 8.3 10e9/L    Absolute Lymphocytes 1.9 0.8 - 5.3 10e9/L    Absolute Monocytes 0.6 0.0 - 1.3 10e9/L    Absolute Eosinophils 0.0 0.0 - 0.7 10e9/L    Absolute Basophils 0.1 0.0 - 0.2 10e9/L    Abs Immature Granulocytes 0.0 0 - 0.4 10e9/L    Absolute Nucleated RBC 0.0    Comprehensive metabolic panel   Result Value Ref Range    Sodium 134 133 - 144 mmol/L    Potassium 3.0 (L) 3.4 - 5.3 mmol/L    Chloride 96 94 - 109 mmol/L    Carbon Dioxide 19 (L) 20 - 32 mmol/L    Anion Gap 19 (H) 3 - 14 mmol/L    Glucose 216 (H) 70 - 99 mg/dL    Urea Nitrogen 31 (H) 7 - 30 mg/dL    Creatinine 0.93 0.52 - 1.04 mg/dL    GFR Estimate 66 >60 mL/min/[1.73_m2]    GFR Estimate If Black 77 >60 mL/min/[1.73_m2]    Calcium 9.4 8.5 - 10.1 mg/dL    Bilirubin Total 1.1 0.2 - 1.3 mg/dL    Albumin 4.1 3.4 - 5.0 g/dL    Protein Total 8.3 6.8 - 8.8 g/dL    Alkaline Phosphatase 102 40 - 150 U/L    ALT 28 0 - 50 U/L    AST 20 0 - 45 U/L   Lactic acid whole blood   Result Value Ref Range    Lactic Acid 2.2 (H) 0.7 - 2.0 mmol/L   Lipase   Result Value Ref Range    Lipase 293 73 - 393 U/L   CT Abdomen Pelvis w Contrast    Narrative    CT ABDOMEN AND PELVIS WITH CONTRAST 3/21/2021 1:32 PM    CLINICAL HISTORY: Abdominal distension; Bowel obstruction suspected.    TECHNIQUE: CT scan of the abdomen and  pelvis was performed following  injection of IV contrast. Multiplanar reformats were obtained. Dose  reduction techniques were used.    CONTRAST: 72mL [id number]-370    COMPARISON: March 18, 2021    FINDINGS:   LOWER CHEST: No infiltrates or effusions.    HEPATOBILIARY: No significant mass or bile duct dilatation. Sludge vs.  stones in the gallbladder. No cholecystitis.     PANCREAS: No significant mass, duct dilatation, or inflammatory  change.    SPLEEN: Complex and heterogeneous cystic lesion in the spleen again  noted with additional smaller cystic lesions, stable.    ADRENAL GLANDS: No significant nodules.    KIDNEYS/BLADDER: No significant mass, stones, or hydronephrosis.    BOWEL: No obstruction or inflammatory change.    PELVIC ORGANS: No pelvic masses.    ADDITIONAL FINDINGS: No ascites.    MUSCULOSKELETAL: Survey of the visualized bony structures demonstrates  no destructive bony lesions.      Impression    IMPRESSION:   No acute process demonstrated in the abdomen and pelvis.       All imaging studies reviewed by me.    Attestation:  I have reviewed today's vital signs, notes, medications, labs and imaging.  Amount of time performed on this history and physical: 70 minutes.        Rasta Osborn MD

## 2021-03-21 NOTE — ED NOTES
Pt c/o sharp chest pain to left anterior chest with associated feeling of hot and sweaty. Cardiac monitor placed, EKG ordered. Provider updated.

## 2021-03-22 ENCOUNTER — APPOINTMENT (OUTPATIENT)
Dept: NUCLEAR MEDICINE | Facility: CLINIC | Age: 61
End: 2021-03-22
Attending: FAMILY MEDICINE
Payer: COMMERCIAL

## 2021-03-22 ENCOUNTER — ANESTHESIA (OUTPATIENT)
Dept: GASTROENTEROLOGY | Facility: CLINIC | Age: 61
End: 2021-03-22
Payer: COMMERCIAL

## 2021-03-22 ENCOUNTER — ANESTHESIA EVENT (OUTPATIENT)
Dept: GASTROENTEROLOGY | Facility: CLINIC | Age: 61
End: 2021-03-22
Payer: COMMERCIAL

## 2021-03-22 PROBLEM — Z11.52 ENCOUNTER FOR SCREENING LABORATORY TESTING FOR SEVERE ACUTE RESPIRATORY SYNDROME CORONAVIRUS 2 (SARS-COV-2): Status: ACTIVE | Noted: 2021-03-22

## 2021-03-22 PROBLEM — E87.20 ACIDOSIS: Status: ACTIVE | Noted: 2021-03-22

## 2021-03-22 PROBLEM — E87.6 HYPOPOTASSEMIA: Status: ACTIVE | Noted: 2021-03-22

## 2021-03-22 LAB
ALBUMIN SERPL-MCNC: 2.7 G/DL (ref 3.4–5)
ALBUMIN UR-MCNC: 30 MG/DL
ALP SERPL-CCNC: 60 U/L (ref 40–150)
ALT SERPL W P-5'-P-CCNC: 17 U/L (ref 0–50)
ANION GAP SERPL CALCULATED.3IONS-SCNC: 12 MMOL/L (ref 3–14)
APPEARANCE UR: CLEAR
AST SERPL W P-5'-P-CCNC: 12 U/L (ref 0–45)
BASE DEFICIT BLDV-SCNC: 6 MMOL/L
BILIRUB SERPL-MCNC: 0.8 MG/DL (ref 0.2–1.3)
BILIRUB UR QL STRIP: NEGATIVE
BUN SERPL-MCNC: 17 MG/DL (ref 7–30)
CALCIUM SERPL-MCNC: 8.1 MG/DL (ref 8.5–10.1)
CHLORIDE SERPL-SCNC: 108 MMOL/L (ref 94–109)
CO2 SERPL-SCNC: 20 MMOL/L (ref 20–32)
COLOR UR AUTO: YELLOW
CREAT SERPL-MCNC: 0.7 MG/DL (ref 0.52–1.04)
ERYTHROCYTE [DISTWIDTH] IN BLOOD BY AUTOMATED COUNT: 12.9 % (ref 10–15)
GFR SERPL CREATININE-BSD FRML MDRD: >90 ML/MIN/{1.73_M2}
GLUCOSE BLDC GLUCOMTR-MCNC: 100 MG/DL (ref 70–99)
GLUCOSE BLDC GLUCOMTR-MCNC: 128 MG/DL (ref 70–99)
GLUCOSE BLDC GLUCOMTR-MCNC: 244 MG/DL (ref 70–99)
GLUCOSE BLDC GLUCOMTR-MCNC: 246 MG/DL (ref 70–99)
GLUCOSE BLDC GLUCOMTR-MCNC: 96 MG/DL (ref 70–99)
GLUCOSE BLDC GLUCOMTR-MCNC: 99 MG/DL (ref 70–99)
GLUCOSE SERPL-MCNC: 100 MG/DL (ref 70–99)
GLUCOSE UR STRIP-MCNC: >499 MG/DL
HCO3 BLDV-SCNC: 20 MMOL/L (ref 21–28)
HCT VFR BLD AUTO: 39.3 % (ref 35–47)
HGB BLD-MCNC: 12.9 G/DL (ref 11.7–15.7)
HGB UR QL STRIP: NEGATIVE
KETONES UR STRIP-MCNC: 80 MG/DL
LACTATE BLD-SCNC: 0.8 MMOL/L (ref 0.7–2)
LEUKOCYTE ESTERASE UR QL STRIP: NEGATIVE
MAGNESIUM SERPL-MCNC: 1.8 MG/DL (ref 1.6–2.3)
MCH RBC QN AUTO: 28.2 PG (ref 26.5–33)
MCHC RBC AUTO-ENTMCNC: 32.8 G/DL (ref 31.5–36.5)
MCV RBC AUTO: 86 FL (ref 78–100)
MUCOUS THREADS #/AREA URNS LPF: PRESENT /LPF
NITRATE UR QL: NEGATIVE
O2/TOTAL GAS SETTING VFR VENT: 21 %
PCO2 BLDV: 38 MM HG (ref 40–50)
PH BLDV: 7.32 PH (ref 7.32–7.43)
PH UR STRIP: 6 PH (ref 5–7)
PHOSPHATE SERPL-MCNC: 1.3 MG/DL (ref 2.5–4.5)
PHOSPHATE SERPL-MCNC: 1.4 MG/DL (ref 2.5–4.5)
PLATELET # BLD AUTO: 141 10E9/L (ref 150–450)
PO2 BLDV: 61 MM HG (ref 25–47)
POTASSIUM SERPL-SCNC: 3.3 MMOL/L (ref 3.4–5.3)
PROT SERPL-MCNC: 5.5 G/DL (ref 6.8–8.8)
RBC # BLD AUTO: 4.57 10E12/L (ref 3.8–5.2)
RBC #/AREA URNS AUTO: 1 /HPF (ref 0–2)
SODIUM SERPL-SCNC: 140 MMOL/L (ref 133–144)
SOURCE: ABNORMAL
SP GR UR STRIP: 1.03 (ref 1–1.03)
SQUAMOUS #/AREA URNS AUTO: <1 /HPF (ref 0–1)
UPPER GI ENDOSCOPY: NORMAL
UROBILINOGEN UR STRIP-MCNC: 0 MG/DL (ref 0–2)
WBC # BLD AUTO: 5.2 10E9/L (ref 4–11)
WBC #/AREA URNS AUTO: 2 /HPF (ref 0–5)

## 2021-03-22 PROCEDURE — 80053 COMPREHEN METABOLIC PANEL: CPT | Performed by: FAMILY MEDICINE

## 2021-03-22 PROCEDURE — 83735 ASSAY OF MAGNESIUM: CPT | Performed by: FAMILY MEDICINE

## 2021-03-22 PROCEDURE — 83605 ASSAY OF LACTIC ACID: CPT | Performed by: FAMILY MEDICINE

## 2021-03-22 PROCEDURE — 99232 SBSQ HOSP IP/OBS MODERATE 35: CPT | Performed by: FAMILY MEDICINE

## 2021-03-22 PROCEDURE — 0DB38ZX EXCISION OF LOWER ESOPHAGUS, VIA NATURAL OR ARTIFICIAL OPENING ENDOSCOPIC, DIAGNOSTIC: ICD-10-PCS | Performed by: SURGERY

## 2021-03-22 PROCEDURE — 78226 HEPATOBILIARY SYSTEM IMAGING: CPT

## 2021-03-22 PROCEDURE — 250N000009 HC RX 250: Performed by: NURSE ANESTHETIST, CERTIFIED REGISTERED

## 2021-03-22 PROCEDURE — 84100 ASSAY OF PHOSPHORUS: CPT | Performed by: FAMILY MEDICINE

## 2021-03-22 PROCEDURE — 258N000003 HC RX IP 258 OP 636: Performed by: FAMILY MEDICINE

## 2021-03-22 PROCEDURE — G0378 HOSPITAL OBSERVATION PER HR: HCPCS

## 2021-03-22 PROCEDURE — 85027 COMPLETE CBC AUTOMATED: CPT | Performed by: FAMILY MEDICINE

## 2021-03-22 PROCEDURE — 36415 COLL VENOUS BLD VENIPUNCTURE: CPT | Performed by: FAMILY MEDICINE

## 2021-03-22 PROCEDURE — 82803 BLOOD GASES ANY COMBINATION: CPT | Performed by: FAMILY MEDICINE

## 2021-03-22 PROCEDURE — 250N000012 HC RX MED GY IP 250 OP 636 PS 637: Performed by: FAMILY MEDICINE

## 2021-03-22 PROCEDURE — 370N000017 HC ANESTHESIA TECHNICAL FEE, PER MIN: Performed by: SURGERY

## 2021-03-22 PROCEDURE — 120N000001 HC R&B MED SURG/OB

## 2021-03-22 PROCEDURE — 343N000001 HC RX 343: Performed by: FAMILY MEDICINE

## 2021-03-22 PROCEDURE — 96372 THER/PROPH/DIAG INJ SC/IM: CPT | Performed by: FAMILY MEDICINE

## 2021-03-22 PROCEDURE — 43239 EGD BIOPSY SINGLE/MULTIPLE: CPT | Performed by: SURGERY

## 2021-03-22 PROCEDURE — 88305 TISSUE EXAM BY PATHOLOGIST: CPT | Mod: TC | Performed by: SURGERY

## 2021-03-22 PROCEDURE — 250N000009 HC RX 250: Performed by: SURGERY

## 2021-03-22 PROCEDURE — C9113 INJ PANTOPRAZOLE SODIUM, VIA: HCPCS | Performed by: FAMILY MEDICINE

## 2021-03-22 PROCEDURE — 0DB68ZX EXCISION OF STOMACH, VIA NATURAL OR ARTIFICIAL OPENING ENDOSCOPIC, DIAGNOSTIC: ICD-10-PCS | Performed by: SURGERY

## 2021-03-22 PROCEDURE — 250N000013 HC RX MED GY IP 250 OP 250 PS 637: Performed by: FAMILY MEDICINE

## 2021-03-22 PROCEDURE — 88305 TISSUE EXAM BY PATHOLOGIST: CPT | Mod: 26 | Performed by: PATHOLOGY

## 2021-03-22 PROCEDURE — A9537 TC99M MEBROFENIN: HCPCS | Performed by: FAMILY MEDICINE

## 2021-03-22 PROCEDURE — 250N000011 HC RX IP 250 OP 636: Performed by: NURSE ANESTHETIST, CERTIFIED REGISTERED

## 2021-03-22 PROCEDURE — 999N001017 HC STATISTIC GLUCOSE BY METER IP

## 2021-03-22 PROCEDURE — 250N000011 HC RX IP 250 OP 636: Performed by: FAMILY MEDICINE

## 2021-03-22 PROCEDURE — 99207 PR CDG-MDM COMPONENT: MEETS LOW - DOWN CODED: CPT | Performed by: FAMILY MEDICINE

## 2021-03-22 PROCEDURE — 81001 URINALYSIS AUTO W/SCOPE: CPT | Performed by: EMERGENCY MEDICINE

## 2021-03-22 RX ORDER — KIT FOR THE PREPARATION OF TECHNETIUM TC 99M MEBROFENIN 45 MG/10ML
4.7 INJECTION, POWDER, LYOPHILIZED, FOR SOLUTION INTRAVENOUS ONCE
Status: COMPLETED | OUTPATIENT
Start: 2021-03-22 | End: 2021-03-22

## 2021-03-22 RX ORDER — POTASSIUM CHLORIDE 1.5 G/1.58G
40 POWDER, FOR SOLUTION ORAL ONCE
Status: COMPLETED | OUTPATIENT
Start: 2021-03-22 | End: 2021-03-22

## 2021-03-22 RX ORDER — PROPOFOL 10 MG/ML
INJECTION, EMULSION INTRAVENOUS PRN
Status: DISCONTINUED | OUTPATIENT
Start: 2021-03-22 | End: 2021-03-22

## 2021-03-22 RX ORDER — LIDOCAINE 40 MG/G
CREAM TOPICAL
Status: DISCONTINUED | OUTPATIENT
Start: 2021-03-22 | End: 2021-03-25 | Stop reason: HOSPADM

## 2021-03-22 RX ORDER — LIDOCAINE HYDROCHLORIDE 10 MG/ML
INJECTION, SOLUTION INFILTRATION; PERINEURAL PRN
Status: DISCONTINUED | OUTPATIENT
Start: 2021-03-22 | End: 2021-03-22

## 2021-03-22 RX ORDER — POTASSIUM CHLORIDE 1500 MG/1
40 TABLET, EXTENDED RELEASE ORAL ONCE
Status: DISCONTINUED | OUTPATIENT
Start: 2021-03-22 | End: 2021-03-22

## 2021-03-22 RX ORDER — OXYCODONE HYDROCHLORIDE 5 MG/1
5 TABLET ORAL EVERY 8 HOURS PRN
Status: DISCONTINUED | OUTPATIENT
Start: 2021-03-22 | End: 2021-03-25 | Stop reason: HOSPADM

## 2021-03-22 RX ADMIN — LIDOCAINE HYDROCHLORIDE 100 MG: 10 INJECTION, SOLUTION INFILTRATION; PERINEURAL at 14:26

## 2021-03-22 RX ADMIN — INSULIN ASPART 5 UNITS: 100 INJECTION, SOLUTION INTRAVENOUS; SUBCUTANEOUS at 21:32

## 2021-03-22 RX ADMIN — ROSUVASTATIN CALCIUM 20 MG: 20 TABLET, FILM COATED ORAL at 17:37

## 2021-03-22 RX ADMIN — PANTOPRAZOLE SODIUM 40 MG: 40 INJECTION, POWDER, FOR SOLUTION INTRAVENOUS at 20:41

## 2021-03-22 RX ADMIN — TOPIRAMATE 100 MG: 100 TABLET, FILM COATED ORAL at 17:37

## 2021-03-22 RX ADMIN — CARVEDILOL 6.25 MG: 6.25 TABLET, FILM COATED ORAL at 08:22

## 2021-03-22 RX ADMIN — FAMOTIDINE 20 MG: 20 INJECTION, SOLUTION INTRAVENOUS at 17:34

## 2021-03-22 RX ADMIN — MEBROFENIN 4.7 MILLICURIE: 45 INJECTION, POWDER, LYOPHILIZED, FOR SOLUTION INTRAVENOUS at 11:25

## 2021-03-22 RX ADMIN — ONDANSETRON 4 MG: 2 INJECTION INTRAMUSCULAR; INTRAVENOUS at 01:36

## 2021-03-22 RX ADMIN — POTASSIUM CHLORIDE 40 MEQ: 1.5 POWDER, FOR SOLUTION ORAL at 16:32

## 2021-03-22 RX ADMIN — INSULIN ASPART 5 UNITS: 100 INJECTION, SOLUTION INTRAVENOUS; SUBCUTANEOUS at 18:43

## 2021-03-22 RX ADMIN — PROPOFOL 50 MG: 10 INJECTION, EMULSION INTRAVENOUS at 14:27

## 2021-03-22 RX ADMIN — SODIUM CHLORIDE, POTASSIUM CHLORIDE, SODIUM LACTATE AND CALCIUM CHLORIDE: 600; 310; 30; 20 INJECTION, SOLUTION INTRAVENOUS at 14:17

## 2021-03-22 RX ADMIN — MICONAZOLE NITRATE: 20 POWDER TOPICAL at 09:39

## 2021-03-22 RX ADMIN — LOSARTAN POTASSIUM 50 MG: 50 TABLET, FILM COATED ORAL at 08:22

## 2021-03-22 RX ADMIN — SODIUM CHLORIDE, POTASSIUM CHLORIDE, SODIUM LACTATE AND CALCIUM CHLORIDE: 600; 310; 30; 20 INJECTION, SOLUTION INTRAVENOUS at 09:45

## 2021-03-22 RX ADMIN — POTASSIUM & SODIUM PHOSPHATES POWDER PACK 280-160-250 MG 2 PACKET: 280-160-250 PACK at 16:28

## 2021-03-22 RX ADMIN — PANTOPRAZOLE SODIUM 40 MG: 40 INJECTION, POWDER, FOR SOLUTION INTRAVENOUS at 08:21

## 2021-03-22 RX ADMIN — POTASSIUM & SODIUM PHOSPHATES POWDER PACK 280-160-250 MG 2 PACKET: 280-160-250 PACK at 20:41

## 2021-03-22 RX ADMIN — SODIUM CHLORIDE, POTASSIUM CHLORIDE, SODIUM LACTATE AND CALCIUM CHLORIDE: 600; 310; 30; 20 INJECTION, SOLUTION INTRAVENOUS at 01:33

## 2021-03-22 RX ADMIN — FAMOTIDINE 20 MG: 20 INJECTION, SOLUTION INTRAVENOUS at 05:03

## 2021-03-22 RX ADMIN — SODIUM CHLORIDE, POTASSIUM CHLORIDE, SODIUM LACTATE AND CALCIUM CHLORIDE: 600; 310; 30; 20 INJECTION, SOLUTION INTRAVENOUS at 22:37

## 2021-03-22 RX ADMIN — PROPOFOL 100 MG: 10 INJECTION, EMULSION INTRAVENOUS at 14:26

## 2021-03-22 ASSESSMENT — ACTIVITIES OF DAILY LIVING (ADL)
ADLS_ACUITY_SCORE: 17
DEPENDENT_IADLS:: INDEPENDENT
ADLS_ACUITY_SCORE: 17

## 2021-03-22 ASSESSMENT — MIFFLIN-ST. JEOR
SCORE: 1153.5
SCORE: 1187.5

## 2021-03-22 NOTE — PLAN OF CARE
"Pt alert/oriented. States no nausea this evening. Abdomen soft, BS+, tender to touch. States has occasional \"stabs of pain\" in LUQ. Taking sips of water. Afebrile.  "

## 2021-03-22 NOTE — UTILIZATION REVIEW
"    Admission Status; Secondary Review Determination         Under the authority of the Utilization Management Committee, the utilization review process indicated a secondary review on the above patient.  The review outcome is based on review of the medical records, discussions with staff, and applying clinical experience noted on the date of the review.        ()      Inpatient Status Appropriate - This patient's medical care is consistent with medical management for inpatient care and reasonable inpatient medical practice.      (X) Observation Status Appropriate - This patient does not meet hospital inpatient criteria and is placed in observation status. If this patient's primary payer is Medicare and was admitted as an inpatient, Condition Code 44 should be used and patient status changed to \"observation\".   () Admission Status NOT Appropriate - This patient's medical care is not consistent with medical management for Inpatient or Observation Status.          RATIONALE FOR DETERMINATION     \"This patient is a 60 year old  female with a significant past medical history of chronic abdominal pain \"ever since I was a kid\" with diagnoses of chronic constipation and IBS who presents with nausea, vomiting and abdominal pain.  Patient is unfortunately a very poor and inconsistent historian and is quite vague in many of her responses, but says she's had abdominal pain ever since childhood.  About 1 week ago says she developed nausea and vomiting after having lunch with a friend and that has persisted for the past week.  Says she 'hasn't been able to keep anything down\" and hasn't been taking any of her medications.  Tried taking some ODT zofran but says she then drunk water afterwards and vomited.  No fever or chills.  Hard to assess her pain compared to baseline.\"    The patient had a CT A/P and HIDA scan which were non-diagnostic. She will be having an EGD later today. The patient is hemodynamically stable. "     Given no acute findings so far and stability with minimal IV anti-emetics, observation status is appropriate.      The severity of illness, intensity of service provided, expected LOS make it appropriate for hospital observation.        The information on this document is developed by the utilization review team in order for the business office to ensure compliance.  This only denotes the appropriateness of proper admission status and does not reflect the quality of care rendered.         The definitions of Inpatient Status and Observation Status used in making the determination above are those provided in the CMS Coverage Manual, Chapter 1 and Chapter 6, section 70.4.      Sincerely,     MARIA TERESA OLIVA MD    Physician Advisor  Utilization Review/ Case Management  [facility].

## 2021-03-22 NOTE — CONSULTS
Care Management Initial Consult    General Information  Assessment completed with: Patient,    Type of CM/SW Visit: Offer D/C Planning    Primary Care Provider verified and updated as needed: Yes   Readmission within the last 30 days:        Reason for Consult: discharge planning  Advance Care Planning: Advance Care Planning Reviewed: no concerns identified, questions answered          Communication Assessment  Patient's communication style: spoken language (English or Bilingual)    Hearing Difficulty or Deaf: no   Wear Glasses or Blind: yes    Cognitive  Cognitive/Neuro/Behavioral: WDL                      Living Environment:   People in home: parent(s)  Claribel  Current living Arrangements: house      Able to return to prior arrangements: yes       Family/Social Support:  Care provided by: self  Provides care for: parent(s)  Marital Status:   Children, Parent(s)          Description of Support System: Supportive, Involved    Support Assessment: Adequate family and caregiver support    Current Resources:   Patient receiving home care services: No     Community Resources: None  Equipment currently used at home: glucometer  Supplies currently used at home:      Employment/Financial:  Employment Status: unemployed     Employment/ Comments: (seeking employement)  Financial Concerns: No concerns identified           Lifestyle & Psychosocial Needs:        Socioeconomic History     Marital status: Legally      Spouse name: Not on file     Number of children: Not on file     Years of education: Not on file     Highest education level: Not on file     Tobacco Use     Smoking status: Never Smoker     Smokeless tobacco: Never Used   Substance and Sexual Activity     Alcohol use: No     Frequency: Never     Drug use: No     Sexual activity: Never       Functional Status:  Prior to admission patient needed assistance:   Dependent ADLs:: Independent  Dependent IADLs:: Independent       Mental  "Health Status:  Mental Health Status: No Current Concerns       Chemical Dependency Status:  Chemical Dependency Status: No Current Concerns             Values/Beliefs:  Spiritual, Cultural Beliefs, Jain Practices, Values that affect care: no               Additional Information:  CM referral received to assess and offer resources on discharge.     Pt states she lives with her 80 year old parents. Pt states she has assumed the role of their primary caregiver since becoming unemployed this past year. Pt's father is more physically able bodies -still drives- but was recently diagnosed with cancer. Pt's mother needs more physical assistance.     Pt reports that she has two adult children who live in the [facility]. They are supportive but states \"they have their own lives so I don't like to worry them.\"    Community resources, Home Care services were discussed. Pt states she is hopeful to return home on discharge and does not anticipate needing services.     Pt was off the floor most of the day for procedures. Has not been able to ambulate today to know how her physical status/needs.     Plan:    CM to follow up in am to re-address discharge needs.  Anticipate Pt will be able to return home.     Pt states her father -Evert will provide transport      ERIC Thrasher      "

## 2021-03-22 NOTE — PROGRESS NOTES
[facility] Hospitalist Progress Note           Assessment & Plan      59 yo female with history of chronic abdominal pain as below who presents with 1 week of nausea and vomiting.  Pleasant but very challenging historian.            Nausea and vomiting, intractability of vomiting not specified, unspecified vomiting type - ? Due to gastritis/GERD vs eosinophilic esophagitis as below   3/21/2021 -- sounds like this is her primary new symptom.  Hard historian but sounds like it's been about 1 week with emesis a few times per day and no oral intake other than sips of fluids as a result.  Not taking any home medications as a result either.  CT negative other than some gallbladder sludging.  Assessing that as below.  Nothing for obstruction here.  Could be gastroenteritis but no diarrhea.  Could also be uncontrolled GERD with patient not taking her medications - treat this as below.  For now will rehydrate with LR at 125/h, evaluate abdominal pain as below.  Will start on zofran and compazine prn.  Ok for clears as tolerated tonight, but NPO at midnight for work-up of abdominal pain as below.     3/22/2021 -- nausea much improved after IV zofran, no emesis since admission.   stopping IV zofran, continue ODT zofran prn.  Advance diet as tolerated.         Abdominal pain, epigastric with history of Chronic abdominal pain - suspect due to gastritis with eosinophilic esophagitis also a possibility      Irritable bowel syndrome  3/21/2021 -- Has had abdominal pain since childhood, hard to compare current symptoms to baseline, sounds like somewhat worse with vomiting recently and perhaps more in the right upper quadrant but really hard to tell how much of this is new vs chronic.  Labs normal and CT negative other than gallbladder sludging as above.  Getting US of gallbladder.  Follow labs.  Dilaudid prn for pain, If no clear cholecystitis on US will likely check HIDA scan.  Surgery will see tomorrow  Continue home trulance  for IBS.    3/22/2021 -- HIDA scan normal.  Getting EGD today.  pain improved today, appears at or near baseline - has only had 1 dose of dilaudid so far and no oxycodone.  Stopping dilaudid, slowing oxycodone to every 8 hours if needed, try to minimize use.  UPDATE: EGD showed significant gastritis and some esophagitis but less pronounced - biopsies taken for possible eosinophilic esophagitis but looks most like gastritis per surgery.        Gastroesophageal reflux disease, esophagitis presence not specified  Eosinophilic esophagitis - Seen on outside EGD in 2016.    3/21/21 -- Has been having worsened GERD symptoms, which could be the cause of her nausea, vomiting and abdominal pain as above - will change home protonix to IV twice daily and add IV famotidine.     Eosinophilic esophagitis could also be the primary etiology here - will start with aggressive treatment of her GERD.   3/22/2021 -- EGD consistent with gastritis and possible esophagitis as above - continue IV protonix and famotidine, likely discharge on oral of both of these to start, follow-up with GI and PCP for possible eosinophilic esophagitis pending pathology results.      Chronic constipation  3/21/2021 -- reports last bowel movement 2 weeks ago.  Will attempt to continue home docusate, miralax and senna as tolerated with dulcolax suppository ordered prn as well.  Unclear if this is the cause of her vomiting and abdominal pain or the result of not eating for the past week on top of her baseline constipation, but didn't appear to have a large amount of stool on CT.   3/22/2021 -- no stool yet         Lactic acidosis   3/21/21 -- suspect due to dehydration.  Certainly nothing here for sepsis, and does not have pain much beyond baseline with benign exam - does not look like ischemic bowel.  Recheck of lactic with VBG after bolus in ER showed improved lactic, mild acidosis but suspect will resolve overnight with rehydration.   3/22/2021 -- resolved.   "     Hypokalemia/hypophosphatemia   3/21/2021 -- due to vomiting and poor intake past week, started on replacement protocol.     3/22/2021 -- improving, resuming diet.      Thrombocytopenia  3/22/2021 -- suspect just dilutional but down a fair bit, follow for now.        Type 2 diabetes mellitus with mild nonproliferative retinopathy without macular edema, without long-term current use of insulin, unspecified laterality (H)  A1c 11.8 in 1/21 - rechecking A1c.  Home home trulicity, metformin and victoza.  Placing on high dose NPO sliding scale insulin for now.          Essential hypertension, benign  Blood pressure stable, continue home lermisartan or equivalent        Hyperlipidemia LDL goal <100  Continue home crestor      Cystic lesions spleen  Stable per radiology from recent CT.       Asymptomatic COVID screen pending.           Prophylaxis  mechaincal for now     Lines  PIV     Diet  Liquids, then NPO at midnight.          Diet  Orders Placed This Encounter      NPO per Anesthesia Guidelines for Procedure/Surgery Except for: Meds  ADAT after EGD              Disposition  Hope for discharge tomorrow if electrolytes normalized and tolerating oral intake and oral management of pain/nausea.              Interval History:   Patient improving - abdominal pain better, closer to baseline.  Still has nausea but better, no emesis since admission.  No fever or chills.  Says the zofran has been helping \"a lot\" and that she thinks the IV version does much more than the oral version.  No other changes.     No other pain             Review of Systems:    ROS: 10 point ROS neg other than the symptoms noted above in the HPI.           Medications:   Current active medications and PTA medications reviewed, see medication list for details.            Physical Exam:   Vitals were reviewed  Patient Vitals for the past 24 hrs:   BP Temp Temp src Pulse Resp SpO2 Height Weight   03/22/21 1415 104/66 98  F (36.7  C) Oral -- 14 -- -- -- "   21 1100 -- 97  F (36.1  C) Oral 82 18 94 % -- --   21 1054 94/56 -- -- 80 -- 95 % -- --   21 0828 117/67 -- -- -- -- -- -- --   21 0745 104/65 97.9  F (36.6  C) Oral 84 18 95 % -- --   21 0542 112/66 97.2  F (36.2  C) Oral 92 18 94 % -- 66.2 kg (145 lb 15.1 oz)   21 2239 111/68 98.7  F (37.1  C) Oral 97 18 97 % -- --   21 1927 110/72 97.5  F (36.4  C) Oral 91 18 94 % -- --   21 1703 121/69 -- -- -- -- -- -- --   21 1622 -- 97.7  F (36.5  C) Oral 97 18 97 % 1.524 m (5') 62.3 kg (137 lb 5.6 oz)   21 1600 112/73 -- -- 92 28 95 % -- --   21 1500 (!) 115/90 -- -- 87 12 96 % -- --   21 1445 -- -- -- 89 8 99 % -- --       Temperatures:  Current - Temp: 98  F (36.7  C); Max - Temp  Av.7  F (36.5  C)  Min: 97  F (36.1  C)  Max: 98.7  F (37.1  C)  Respiration range: Resp  Av  Min: 8  Max: 28  Pulse range: Pulse  Av.1  Min: 80  Max: 97  Blood pressure range: Systolic (24hrs), Av , Min:94 , Max:121   ; Diastolic (24hrs), Av, Min:56, Max:90    Pulse oximetry range: SpO2  Av.6 %  Min: 94 %  Max: 99 %  I/O last 3 completed shifts:  In: 1643 [P.O.:60; I.V.:1583]  Out: 800 [Urine:800]    Intake/Output Summary (Last 24 hours) at 3/22/2021 1431  Last data filed at 3/22/2021 0505  Gross per 24 hour   Intake 1643 ml   Output 800 ml   Net 843 ml     EXAM:  General: awake and alert, NAD, oriented x 3  Head: normocephalic  Neck: unremarkable, no lymphadenopathy   HEENT: oropharynx pink and moist    Heart: Regular rate and rhythm, no murmurs, rubs, or gallops  Lungs: clear to auscultation bilaterally with good air movement throughout  Abdomen: soft, abdomen much less tender - just slight tenderness throughout even with palpation, no masses or organomegaly  Extremities: no edema in lower extremities   Skin unremarkable.               Data:     Results for orders placed or performed during the hospital encounter of 21 (from the past 24  hour(s))   Abdomen US, limited (RUQ only)    Narrative    US ABDOMEN LIMITED 3/21/2021 3:21 PM    CLINICAL HISTORY: Abdominal pain, vomiting, gallbladder with sludge  and stone on CTs.    TECHNIQUE: Limited abdominal ultrasound.    COMPARISON: None.    FINDINGS:    GALLBLADDER: Cholelithiasis without cholecystitis.    BILE DUCTS: There is no biliary dilatation. The common duct measures 3  mm.    LIVER: Unremarkable where seen.    RIGHT KIDNEY: No hydronephrosis.    PANCREAS: The visualized portions of the pancreas are normal.    No ascites.      Impression    IMPRESSION:  Cholelithiasis without cholecystitis.    ANTONIA NUÑEZ MD   Blood gas venous   Result Value Ref Range    Ph Venous 7.21 (L) 7.32 - 7.43 pH    PCO2 Venous 49 40 - 50 mm Hg    PO2 Venous 33 25 - 47 mm Hg    Bicarbonate Venous 19 (L) 21 - 28 mmol/L    Base Deficit Venous 8.6 mmol/L    FIO2 REPORT AMENDED:    Lactic acid whole blood   Result Value Ref Range    Lactic Acid 1.5 0.7 - 2.0 mmol/L   Glucose by meter   Result Value Ref Range    Glucose 178 (H) 70 - 99 mg/dL   Potassium   Result Value Ref Range    Potassium 3.4 3.4 - 5.3 mmol/L   Hemoglobin A1c   Result Value Ref Range    Hemoglobin A1C 10.6 (H) 0 - 5.6 %   Glucose by meter   Result Value Ref Range    Glucose 153 (H) 70 - 99 mg/dL   Potassium   Result Value Ref Range    Potassium 3.4 3.4 - 5.3 mmol/L   Glucose by meter   Result Value Ref Range    Glucose 100 (H) 70 - 99 mg/dL   UA reflex to Microscopic   Result Value Ref Range    Color Urine Yellow     Appearance Urine Clear     Glucose Urine >499 (A) NEG^Negative mg/dL    Bilirubin Urine Negative NEG^Negative    Ketones Urine 80 (A) NEG^Negative mg/dL    Specific Gravity Urine 1.035 1.003 - 1.035    Blood Urine Negative NEG^Negative    pH Urine 6.0 5.0 - 7.0 pH    Protein Albumin Urine 30 (A) NEG^Negative mg/dL    Urobilinogen mg/dL 0.0 0.0 - 2.0 mg/dL    Nitrite Urine Negative NEG^Negative    Leukocyte Esterase Urine Negative NEG^Negative     Source Midstream Urine     RBC Urine 1 0 - 2 /HPF    WBC Urine 2 0 - 5 /HPF    Squamous Epithelial /HPF Urine <1 0 - 1 /HPF    Mucous Urine Present (A) NEG^Negative /LPF   Lactic acid whole blood   Result Value Ref Range    Lactic Acid 0.8 0.7 - 2.0 mmol/L   Comprehensive metabolic panel   Result Value Ref Range    Sodium 140 133 - 144 mmol/L    Potassium 3.3 (L) 3.4 - 5.3 mmol/L    Chloride 108 94 - 109 mmol/L    Carbon Dioxide 20 20 - 32 mmol/L    Anion Gap 12 3 - 14 mmol/L    Glucose 100 (H) 70 - 99 mg/dL    Urea Nitrogen 17 7 - 30 mg/dL    Creatinine 0.70 0.52 - 1.04 mg/dL    GFR Estimate >90 >60 mL/min/[1.73_m2]    GFR Estimate If Black >90 >60 mL/min/[1.73_m2]    Calcium 8.1 (L) 8.5 - 10.1 mg/dL    Bilirubin Total 0.8 0.2 - 1.3 mg/dL    Albumin 2.7 (L) 3.4 - 5.0 g/dL    Protein Total 5.5 (L) 6.8 - 8.8 g/dL    Alkaline Phosphatase 60 40 - 150 U/L    ALT 17 0 - 50 U/L    AST 12 0 - 45 U/L   Magnesium   Result Value Ref Range    Magnesium 1.8 1.6 - 2.3 mg/dL   Phosphorus   Result Value Ref Range    Phosphorus 1.4 (L) 2.5 - 4.5 mg/dL   CBC with platelets   Result Value Ref Range    WBC 5.2 4.0 - 11.0 10e9/L    RBC Count 4.57 3.8 - 5.2 10e12/L    Hemoglobin 12.9 11.7 - 15.7 g/dL    Hematocrit 39.3 35.0 - 47.0 %    MCV 86 78 - 100 fl    MCH 28.2 26.5 - 33.0 pg    MCHC 32.8 31.5 - 36.5 g/dL    RDW 12.9 10.0 - 15.0 %    Platelet Count 141 (L) 150 - 450 10e9/L   Blood gas venous   Result Value Ref Range    Ph Venous 7.32 7.32 - 7.43 pH    PCO2 Venous 38 (L) 40 - 50 mm Hg    PO2 Venous 61 (H) 25 - 47 mm Hg    Bicarbonate Venous 20 (L) 21 - 28 mmol/L    Base Deficit Venous 6.0 mmol/L    FIO2 21    Glucose by meter   Result Value Ref Range    Glucose 96 70 - 99 mg/dL   Glucose by meter   Result Value Ref Range    Glucose 99 70 - 99 mg/dL   NM HepatOBiliary Scan    Narrative    NM HEPATOBILIARY SCAN 3/22/2021 1:42 PM    HISTORY: Right upper quadrant pain.    PROCEDURE: 4.7 mCi of Tc 99m Mebrofenin is given intravenously  for  this study.    FINDINGS: Gallbladder activity is identified at 35 minutes into the  study. Bowel activity is identified at 10 minutes into the study.  The  gallbladder fills with radiotracer and quickly empties out before a  gallbladder ejection fraction portion of the study can be performed.  This occurred twice. Therefore, a formal gallbladder ejection fraction  cannot be calculated but it is suspected to be within normal limits.      Impression    IMPRESSION:  1. Normal visualization of gallbladder.  2. Gallbladder ejection fraction is within normal limits, as described  above.    RASHEL CHRISTIANSON MD   Glucose by meter   Result Value Ref Range    Glucose 128 (H) 70 - 99 mg/dL           Attestation:  I have reviewed today's vital signs, notes, medications, labs and imaging.  Amount of time spent in direct patient care: 40 minutes.     Rashel Osborn MD, MD

## 2021-03-22 NOTE — PROGRESS NOTES
"CLINICAL NUTRITION SERVICES - ASSESSMENT NOTE     Nutrition Prescription    RECOMMENDATIONS FOR MDs/PROVIDERS TO ORDER:  Pt may benefit from referral to out patient Dietitian for help with diet and IBS/GERD     Malnutrition Status:    Severe malnutrition in the context of acute illness     Recommendations already ordered by Registered Dietitian (RD):  None at this time     Future/Additional Recommendations:  ADAT- follow for tolerance/adequacy of oral intake   RD to offer nutritional supplements once diet advanced        REASON FOR ASSESSMENT  Lorrie Blake is a/an 60 year old female assessed by the dietitian for MST score of 2: positive  for wt loss of 2-13 lbs and positive  for poor PO intake R/t decreased appetite    Per RN consult \"patient with recent weight loss over past 6 days from clinic visit to hospital admission.\"     Patient is a 60 year old  female with a significant past medical history of chronic abdominal pain \"ever since I was a kid\" with diagnoses of chronic constipation and IBS who presents with nausea, vomiting and abdominal pain.   -Pt with chronic constipation-  reports last bowel movement 2 weeks ago   -IBS- Labs normal and CT negative other than gallbladder sludging as above.  Getting US of gallbladder. If no clear cholecystitis on US will likely check HIDA scan  -GERD Eosinophilic esophagitis - Seen on outside EGD in 2016.  Has been having worsened GERD symptoms, which could be the cause of her     NUTRITION HISTORY  -Met with pt this morning at bedside, pt reports I week of nausea/vomiting and abd pain, unable to keep anything down besides water.   -Pt states that breads and meats get stuck in her esophagus and she tends to avid these foods d/t potention of choking. Pt also avoids spicy and acidic foods.   -When asked what pt eats on a regular basis, pt was unable to sate what she eats. Pt states that she likes sweets. She cares for her parents and makes them lunch and dinner " "meals, however she does not typically eat what she makes for them.    -zofran has been helping per pt report.     CURRENT NUTRITION ORDERS  Diet: NPO  Intake/Tolerance: Pt tried a pice of toast, however during visit pt started to hiccup and not feel well.      LABS  Labs reviewed    MEDICATIONS  LR at 125 mL/hr, Phenergan, Zofran, others reviewed     ANTHROPOMETRICS  Height: 152.4 cm (5' 0\")  Most Recent Weight: 66.2 kg (145 lb 15.1 oz)    IBW: 45.5 kg  BMI: Overweight BMI 25-29.9    Weight History:   Wt Readings from Last 10 Encounters:   03/22/21 66.2 kg (145 lb 15.1 oz)   03/15/21 67.2 kg (148 lb 2 oz)   01/12/21 67.2 kg (148 lb 4 oz)   07/02/20 68.5 kg (151 lb)   11/06/19 66.5 kg (146 lb 11.2 oz)   09/18/19 66.2 kg (146 lb)   07/22/19 65 kg (143 lb 4.8 oz)   04/05/19 64 kg (141 lb)   03/18/19 62 kg (136 lb 9.6 oz)   11/12/18 66.1 kg (145 lb 11.2 oz)   Pt reports recent wt of 148 lbs   Pt had a bed scale wt of 137 lbs/62 kg on 3/21 for a 7% wt loss over one week.       Dosing Weight: 66 kg CBW    ASSESSED NUTRITION NEEDS  Estimated Energy Needs: 0408-3887 kcals/day (20 - 25 kcals/kg)  Justification: Maintenance  Estimated Protein Needs: 66-79 grams protein/day (1 - 1.2 grams of pro/kg)  Justification: Maintenance  Estimated Fluid Needs (1 mL/kcal)   Justification: Maintenance    PHYSICAL FINDINGS  See malnutrition section below.      MALNUTRITION  % Intake: </= 50% for >/= 5 days (severe)  % Weight Loss: > 2% in 1 week (severe)  Subcutaneous Fat Loss: None observed  Muscle Loss: None observed  Fluid Accumulation/Edema: None noted  Malnutrition Diagnosis: Severe malnutrition in the context of acute illness     NUTRITION DIAGNOSIS  Inadequate oral intake related to N/V abdominal pain as evidenced by poor oral intake with associated wt loss and dx of malnutrition      INTERVENTIONS  Implementation  Nutrition education for nutrition relationship to health/disease, encouraged pt to eat more protein rich foods, fresh " fruits/veggies as able and whole grains to support a healthy gut.   Medical food supplement therapy: will offer supplements once diet advanced   Multivitamin/mineral supplement therapy      Goals  Patient to consume % of nutritionally adequate meal trays TID, or the equivalent with supplements/snacks.     Monitoring/Evaluation  Progress toward goals will be monitored and evaluated per protocol.    Radha Hadley RD, LD   Clinical Dietitian   [place]: [phone]

## 2021-03-22 NOTE — ANESTHESIA PREPROCEDURE EVALUATION
Anesthesia Pre-Procedure Evaluation    Patient: Lorrie Blake   MRN: [id number] : 1960        Preoperative Diagnosis: Abdominal pain, epigastric [R10.13]   Procedure : Procedure(s):  ESOPHAGOGASTRODUODENOSCOPY, WITH BIOPSY     History reviewed. No pertinent past medical history.   History reviewed. No pertinent surgical history.   Allergies   Allergen Reactions     Ace Inhibitors Itching     Codeine Hives     Lisinopril Nausea and Cough      Social History     Tobacco Use     Smoking status: Never Smoker     Smokeless tobacco: Never Used   Substance Use Topics     Alcohol use: No     Frequency: Never      Wt Readings from Last 1 Encounters:   21 66.2 kg (145 lb 15.1 oz)        Anesthesia Evaluation   Pt has had prior anesthetic. Type: General and MAC.    No history of anesthetic complications       ROS/MED HX  ENT/Pulmonary:  - neg pulmonary ROS     Neurologic:  - neg neurologic ROS     Cardiovascular:     (+) Dyslipidemia hypertension-----    METS/Exercise Tolerance:     Hematologic:  - neg hematologic  ROS     Musculoskeletal:  - neg musculoskeletal ROS     GI/Hepatic: Comment: Nausea and vomiting    (+) GERD, Asymptomatic on medication,     Renal/Genitourinary:  - neg Renal ROS     Endo:     (+) type II DM,     Psychiatric/Substance Use:  - neg psychiatric ROS     Infectious Disease:  - neg infectious disease ROS     Malignancy:  - neg malignancy ROS     Other:            Physical Exam    Airway        Mallampati: II   TM distance: > 3 FB   Neck ROM: full   Mouth opening: > 3 cm    Respiratory Devices and Support         Dental  no notable dental history         Cardiovascular   cardiovascular exam normal          Pulmonary   pulmonary exam normal                OUTSIDE LABS:  CBC:   Lab Results   Component Value Date    WBC 5.2 2021    WBC 7.8 2021    HGB 12.9 2021    HGB 17.1 (H) 2021    HCT 39.3 2021    HCT 53.4 (H) 2021     (L) 2021      03/21/2021     BMP:   Lab Results   Component Value Date     03/22/2021     03/21/2021    POTASSIUM 3.3 (L) 03/22/2021    POTASSIUM 3.4 03/21/2021    CHLORIDE 108 03/22/2021    CHLORIDE 96 03/21/2021    CO2 20 03/22/2021    CO2 19 (L) 03/21/2021    BUN 17 03/22/2021    BUN 31 (H) 03/21/2021    CR 0.70 03/22/2021    CR 0.93 03/21/2021     (H) 03/22/2021     (H) 03/21/2021     COAGS: No results found for: PTT, INR, FIBR  POC:   Lab Results   Component Value Date     (H) 03/22/2021     HEPATIC:   Lab Results   Component Value Date    ALBUMIN 2.7 (L) 03/22/2021    PROTTOTAL 5.5 (L) 03/22/2021    ALT 17 03/22/2021    AST 12 03/22/2021    ALKPHOS 60 03/22/2021    BILITOTAL 0.8 03/22/2021     OTHER:   Lab Results   Component Value Date    LACT 0.8 03/22/2021    A1C 10.6 (H) 03/21/2021    MANOJ 8.1 (L) 03/22/2021    PHOS 1.4 (L) 03/22/2021    MAG 1.8 03/22/2021    LIPASE 293 03/21/2021    TSH 1.07 01/22/2018    T4 1.01 01/22/2018       Anesthesia Plan    ASA Status:  2   NPO Status:  NPO Appropriate    Anesthesia Type: MAC.     - Reason for MAC: immobility needed              Consents    Anesthesia Plan(s) and associated risks, benefits, and realistic alternatives discussed. Questions answered and patient/representative(s) expressed understanding.     - Discussed with:  Patient      - Extended Intubation/Ventilatory Support Discussed: No.      - Patient is DNR/DNI Status: No    Use of blood products discussed: No .     Postoperative Care            Comments:                NOLVIA Miles CRNA

## 2021-03-22 NOTE — OP NOTE
EGD - moderate gastritis without ulceration biopsied for H Pylori.  No hiatal hernia. Esophagus biopsied for eosinophilic esophagitis. No acute inflammation.  Duodenum normal.

## 2021-03-22 NOTE — ANESTHESIA POSTPROCEDURE EVALUATION
Patient: Lorrie Blake    Procedure(s):  ESOPHAGOGASTRODUODENOSCOPY, WITH BIOPSY    Diagnosis:Abdominal pain, epigastric [R10.13]  Diagnosis Additional Information: No value filed.    Anesthesia Type:  No value filed.    Note:  Disposition: Inpatient   Postop Pain Control: Uneventful            Sign Out: Well controlled pain   PONV: No   Neuro/Psych: Uneventful            Sign Out: Acceptable/Baseline neuro status   Airway/Respiratory: Uneventful            Sign Out: Acceptable/Baseline resp. status   CV/Hemodynamics: Uneventful            Sign Out: Acceptable CV status   Other NRE: NONE   DID A NON-ROUTINE EVENT OCCUR? No         Last vitals:  Vitals:    03/22/21 1439 03/22/21 1445 03/22/21 1447   BP: (!) 80/50 (!) 82/47 95/58   Pulse: 94 81 86   Resp: 14  14   Temp:      SpO2: 95% 95% 96%       Last vitals prior to Anesthesia Care Transfer:  CRNA VITALS  3/22/2021 1405 - 3/22/2021 1451      3/22/2021             Pulse:  85    SpO2:  100 %    Resp Rate (observed):  16          Electronically Signed By: NOLVIA Miles CRNA  March 22, 2021  2:51 PM

## 2021-03-22 NOTE — PLAN OF CARE
"Pt alert/oriented. Slight nauseated. Pt states she is belching & has hiccups occasionally & then feels nauseated & needs a sip of water to \"tame things down\" so she doesn't throw up. Zofran given with relief. Pt abdomen soft, tender to touch. BS+, continues to have occasional pain in LUQ. Afebrile. Up with 1 assist & gait belt as pt is unsteady. UA sent this shift. Checking BG every 4hrs. Pt NPO x meds for surgical consult today.  "

## 2021-03-22 NOTE — ANESTHESIA CARE TRANSFER NOTE
Patient: Lorrie Blake    Procedure(s):  ESOPHAGOGASTRODUODENOSCOPY, WITH BIOPSY    Diagnosis: Abdominal pain, epigastric [R10.13]  Diagnosis Additional Information: No value filed.    Anesthesia Type:   No value filed.     Note:    Oropharynx: spontaneously breathing  Level of Consciousness: drowsy      Independent Airway: airway patency satisfactory and stable  Dentition: dentition unchanged  Vital Signs Stable: post-procedure vital signs reviewed and stable  Report to RN Given: handoff report given  Patient transferred to: Phase II    Handoff Report: Identifed the Patient, Identified the Reponsible Provider, Reviewed the pertinent medical history, Discussed the surgical course, Reviewed Intra-OP anesthesia mangement and issues during anesthesia, Set expectations for post-procedure period and Allowed opportunity for questions and acknowledgement of understanding      Vitals: (Last set prior to Anesthesia Care Transfer)  CRNA VITALS  3/22/2021 1405 - 3/22/2021 1436      3/22/2021             Pulse:  85    SpO2:  100 %    Resp Rate (observed):  16        Electronically Signed By: NOLVIA Miles CRNA  March 22, 2021  2:36 PM

## 2021-03-22 NOTE — H&P
60 year old year old female here for upper endoscopy for epigastric pain.        Patient Active Problem List   Diagnosis     Overweight (BMI 25.0-29.9)     Fatigue, unspecified type     Type 2 diabetes mellitus with mild nonproliferative retinopathy without macular edema, without long-term current use of insulin, unspecified laterality (H)     Essential hypertension, benign     Gastroesophageal reflux disease, esophagitis presence not specified     Non compliance with medical treatment     Financial difficulties     Hyperlipidemia LDL goal <100     Chronic idiopathic constipation     Irritable bowel syndrome with constipation     Abdominal pain, epigastric     Hypokalemia     Lactic acidosis     Nausea and vomiting, intractability of vomiting not specified, unspecified vomiting type       History reviewed. No pertinent past medical history.    History reviewed. No pertinent surgical history.    History reviewed. No pertinent family history.    No current outpatient medications on file.       Allergies   Allergen Reactions     Ace Inhibitors Itching     Codeine Hives     Lisinopril Nausea and Cough       Pt reports that she has never smoked. She has never used smokeless tobacco. She reports that she does not drink alcohol or use drugs.    Exam:    Awake, Alert OX3  Lungs - CTA bilaterally  CV - RRR, no murmurs, distal pulses intact  Abd - soft, non-distended, non-tender, +BS  Extr - No cyanosis or edema    A/P 60 year old year old female in need of upper endoscopy for epigastric pain. Risks, benefits, alternatives, and complications were discussed including the possibility of perforation and the patient agreed to proceed.    Myron Willingham MD

## 2021-03-23 LAB
ALBUMIN SERPL-MCNC: 2.5 G/DL (ref 3.4–5)
ALP SERPL-CCNC: 69 U/L (ref 40–150)
ALT SERPL W P-5'-P-CCNC: 16 U/L (ref 0–50)
ANION GAP SERPL CALCULATED.3IONS-SCNC: 7 MMOL/L (ref 3–14)
AST SERPL W P-5'-P-CCNC: 11 U/L (ref 0–45)
BILIRUB SERPL-MCNC: 0.6 MG/DL (ref 0.2–1.3)
BUN SERPL-MCNC: 12 MG/DL (ref 7–30)
CALCIUM SERPL-MCNC: 8.1 MG/DL (ref 8.5–10.1)
CHLORIDE SERPL-SCNC: 111 MMOL/L (ref 94–109)
CO2 SERPL-SCNC: 25 MMOL/L (ref 20–32)
CREAT SERPL-MCNC: 0.73 MG/DL (ref 0.52–1.04)
ERYTHROCYTE [DISTWIDTH] IN BLOOD BY AUTOMATED COUNT: 12.8 % (ref 10–15)
GFR SERPL CREATININE-BSD FRML MDRD: 90 ML/MIN/{1.73_M2}
GLUCOSE BLDC GLUCOMTR-MCNC: 134 MG/DL (ref 70–99)
GLUCOSE BLDC GLUCOMTR-MCNC: 218 MG/DL (ref 70–99)
GLUCOSE BLDC GLUCOMTR-MCNC: 329 MG/DL (ref 70–99)
GLUCOSE BLDC GLUCOMTR-MCNC: 364 MG/DL (ref 70–99)
GLUCOSE BLDC GLUCOMTR-MCNC: 370 MG/DL (ref 70–99)
GLUCOSE SERPL-MCNC: 161 MG/DL (ref 70–99)
HCT VFR BLD AUTO: 35.6 % (ref 35–47)
HGB BLD-MCNC: 11.7 G/DL (ref 11.7–15.7)
MAGNESIUM SERPL-MCNC: 2 MG/DL (ref 1.6–2.3)
MCH RBC QN AUTO: 28.3 PG (ref 26.5–33)
MCHC RBC AUTO-ENTMCNC: 32.9 G/DL (ref 31.5–36.5)
MCV RBC AUTO: 86 FL (ref 78–100)
PHOSPHATE SERPL-MCNC: 1.1 MG/DL (ref 2.5–4.5)
PLATELET # BLD AUTO: 113 10E9/L (ref 150–450)
POTASSIUM SERPL-SCNC: 3.4 MMOL/L (ref 3.4–5.3)
POTASSIUM SERPL-SCNC: 3.4 MMOL/L (ref 3.4–5.3)
POTASSIUM SERPL-SCNC: 4.8 MMOL/L (ref 3.4–5.3)
PROT SERPL-MCNC: 5.1 G/DL (ref 6.8–8.8)
RBC # BLD AUTO: 4.14 10E12/L (ref 3.8–5.2)
SODIUM SERPL-SCNC: 143 MMOL/L (ref 133–144)
WBC # BLD AUTO: 3.2 10E9/L (ref 4–11)

## 2021-03-23 PROCEDURE — 250N000013 HC RX MED GY IP 250 OP 250 PS 637: Performed by: INTERNAL MEDICINE

## 2021-03-23 PROCEDURE — 99232 SBSQ HOSP IP/OBS MODERATE 35: CPT | Performed by: INTERNAL MEDICINE

## 2021-03-23 PROCEDURE — 83735 ASSAY OF MAGNESIUM: CPT | Performed by: FAMILY MEDICINE

## 2021-03-23 PROCEDURE — 84100 ASSAY OF PHOSPHORUS: CPT | Performed by: FAMILY MEDICINE

## 2021-03-23 PROCEDURE — 250N000013 HC RX MED GY IP 250 OP 250 PS 637: Performed by: FAMILY MEDICINE

## 2021-03-23 PROCEDURE — G0378 HOSPITAL OBSERVATION PER HR: HCPCS

## 2021-03-23 PROCEDURE — 999N001017 HC STATISTIC GLUCOSE BY METER IP

## 2021-03-23 PROCEDURE — 36415 COLL VENOUS BLD VENIPUNCTURE: CPT | Performed by: FAMILY MEDICINE

## 2021-03-23 PROCEDURE — 250N000011 HC RX IP 250 OP 636: Performed by: FAMILY MEDICINE

## 2021-03-23 PROCEDURE — 85027 COMPLETE CBC AUTOMATED: CPT | Performed by: FAMILY MEDICINE

## 2021-03-23 PROCEDURE — C9113 INJ PANTOPRAZOLE SODIUM, VIA: HCPCS | Performed by: FAMILY MEDICINE

## 2021-03-23 PROCEDURE — 80053 COMPREHEN METABOLIC PANEL: CPT | Performed by: FAMILY MEDICINE

## 2021-03-23 PROCEDURE — 84132 ASSAY OF SERUM POTASSIUM: CPT | Performed by: INTERNAL MEDICINE

## 2021-03-23 PROCEDURE — 120N000001 HC R&B MED SURG/OB

## 2021-03-23 PROCEDURE — 36415 COLL VENOUS BLD VENIPUNCTURE: CPT | Performed by: INTERNAL MEDICINE

## 2021-03-23 RX ORDER — SUCRALFATE ORAL 1 G/10ML
1 SUSPENSION ORAL
Status: DISCONTINUED | OUTPATIENT
Start: 2021-03-23 | End: 2021-03-25 | Stop reason: HOSPADM

## 2021-03-23 RX ORDER — PANTOPRAZOLE SODIUM 20 MG/1
40 TABLET, DELAYED RELEASE ORAL
Status: DISCONTINUED | OUTPATIENT
Start: 2021-03-23 | End: 2021-03-25 | Stop reason: HOSPADM

## 2021-03-23 RX ORDER — BISACODYL 10 MG
10 SUPPOSITORY, RECTAL RECTAL DAILY PRN
Status: DISCONTINUED | OUTPATIENT
Start: 2021-03-23 | End: 2021-03-25 | Stop reason: HOSPADM

## 2021-03-23 RX ORDER — POTASSIUM CHLORIDE 1.5 G/1.58G
40 POWDER, FOR SOLUTION ORAL ONCE
Status: DISCONTINUED | OUTPATIENT
Start: 2021-03-23 | End: 2021-03-25 | Stop reason: HOSPADM

## 2021-03-23 RX ORDER — POTASSIUM CHLORIDE 1500 MG/1
40 TABLET, EXTENDED RELEASE ORAL ONCE
Status: DISCONTINUED | OUTPATIENT
Start: 2021-03-23 | End: 2021-03-23

## 2021-03-23 RX ORDER — AMOXICILLIN 250 MG
1-2 CAPSULE ORAL 2 TIMES DAILY PRN
Status: DISCONTINUED | OUTPATIENT
Start: 2021-03-23 | End: 2021-03-25 | Stop reason: HOSPADM

## 2021-03-23 RX ORDER — POTASSIUM CHLORIDE 1.5 G/1.58G
40 POWDER, FOR SOLUTION ORAL ONCE
Status: COMPLETED | OUTPATIENT
Start: 2021-03-23 | End: 2021-03-23

## 2021-03-23 RX ADMIN — LOSARTAN POTASSIUM 50 MG: 50 TABLET, FILM COATED ORAL at 08:34

## 2021-03-23 RX ADMIN — PANTOPRAZOLE SODIUM 40 MG: 40 TABLET, DELAYED RELEASE ORAL at 16:14

## 2021-03-23 RX ADMIN — POTASSIUM CHLORIDE FOR ORAL SOLUTION 40 MEQ: 1.5 POWDER, FOR SOLUTION ORAL at 16:25

## 2021-03-23 RX ADMIN — CARVEDILOL 6.25 MG: 6.25 TABLET, FILM COATED ORAL at 08:34

## 2021-03-23 RX ADMIN — DOCUSATE SODIUM AND SENNOSIDES 2 TABLET: 8.6; 5 TABLET ORAL at 16:21

## 2021-03-23 RX ADMIN — SUCRALFATE 1 G: 1 SUSPENSION ORAL at 11:51

## 2021-03-23 RX ADMIN — FAMOTIDINE 20 MG: 20 INJECTION, SOLUTION INTRAVENOUS at 05:35

## 2021-03-23 RX ADMIN — PANTOPRAZOLE SODIUM 40 MG: 40 INJECTION, POWDER, FOR SOLUTION INTRAVENOUS at 08:35

## 2021-03-23 RX ADMIN — SUCRALFATE 1 G: 1 SUSPENSION ORAL at 16:14

## 2021-03-23 RX ADMIN — MICONAZOLE NITRATE: 20 POWDER TOPICAL at 08:36

## 2021-03-23 RX ADMIN — INSULIN ASPART 4 UNITS: 100 INJECTION, SOLUTION INTRAVENOUS; SUBCUTANEOUS at 01:51

## 2021-03-23 RX ADMIN — TOPIRAMATE 100 MG: 100 TABLET, FILM COATED ORAL at 17:05

## 2021-03-23 RX ADMIN — POTASSIUM & SODIUM PHOSPHATES POWDER PACK 280-160-250 MG 2 PACKET: 280-160-250 PACK at 14:13

## 2021-03-23 RX ADMIN — POTASSIUM & SODIUM PHOSPHATES POWDER PACK 280-160-250 MG 2 PACKET: 280-160-250 PACK at 08:32

## 2021-03-23 RX ADMIN — INSULIN ASPART 9 UNITS: 100 INJECTION, SOLUTION INTRAVENOUS; SUBCUTANEOUS at 11:40

## 2021-03-23 RX ADMIN — ROSUVASTATIN CALCIUM 20 MG: 20 TABLET, FILM COATED ORAL at 17:05

## 2021-03-23 RX ADMIN — CARVEDILOL 6.25 MG: 6.25 TABLET, FILM COATED ORAL at 17:05

## 2021-03-23 RX ADMIN — POLYETHYLENE GLYCOL 3350 17 G: 17 POWDER, FOR SOLUTION ORAL at 08:34

## 2021-03-23 RX ADMIN — SUCRALFATE 1 G: 1 SUSPENSION ORAL at 22:03

## 2021-03-23 ASSESSMENT — MIFFLIN-ST. JEOR: SCORE: 1154.21

## 2021-03-23 NOTE — PROGRESS NOTES
Care Transitions Note:    Care Management received referral as pt is concerned about OBS status & cost of her hospitalization.  SW placed call to pt to introduce self/role and explained that pt has Medical Assistance, so OBS status will not have a financial responsibility upon discharge.    ERIC Sauer  Care Transitions   Tele: [phone]

## 2021-03-23 NOTE — PLAN OF CARE
Enjoyed her breakfast this am and has not had any nausea or abdominal pain she has been steady on her feet her blood pressure is low. Blood glucose was high before lunch and she was given 9 units insulin. She has not been taking her po diabetic meds.

## 2021-03-23 NOTE — PROGRESS NOTES
This writer obtained K+ Standing order protocol clarification on if pt needs K+ replacement.  AM potassium dose held d/t K+ being wnl.  Clarification obtained and pt medicated per protocol.  Labs placed to recheck K+ this evening at 2030.

## 2021-03-23 NOTE — PROGRESS NOTES
[facility]    Hospitalist Progress Note    Date of Service (when I saw the patient): 03/23/2021    Assessment & Plan   Lorrie Blake is a 60 year old female who was admitted on 3/21/2021 with history of chronic abdominal pain as below who presents with 1 week of nausea and vomiting.  Pleasant but very challenging historian.       Intractable ausea and vomiting due to gastritis  3/21/2021 -- Sounds like this is her primary new symptom.  Hard historian but sounds like it's been about 1 week with emesis a few times per day and no oral intake other than sips of fluids as a result.  Not taking any home medications as a result either.  CT negative other than some gallbladder sludging.  Assessing that as below.  Nothing for obstruction here.  Could be gastroenteritis but no diarrhea.  Could also be uncontrolled GERD with patient not taking her medications - treat this as below.  For now will rehydrate with LR at 125/h, evaluate abdominal pain as below.  Will start on zofran and compazine prn.  Ok for clears as tolerated tonight, but NPO at midnight for work-up of abdominal pain as below.     3/22/2021 -- Nausea much improved after IV zofran, no emesis since admission.   Stopping IV zofran, continue ODT zofran prn.  Advance diet as tolerated.    3/23/2021 -- Tolerating regular diet without difficulty     Abdominal pain, epigastric with history of Chronic abdominal pain   Possible eosinophilic esophagitis  Irritable bowel syndrome  3/21/2021 -- Has had abdominal pain since childhood, hard to compare current symptoms to baseline, sounds like somewhat worse with vomiting recently and perhaps more in the right upper quadrant but really hard to tell how much of this is new vs chronic.  Labs normal and CT negative other than gallbladder sludging as above.   - Getting US of gallbladder.  Follow labs.  Dilaudid prn for pain, If no clear cholecystitis on US will likely check HIDA scan.  Surgery will  see tomorrow.  Continue home trulance for IBS.    3/22/2021 -- HIDA scan normal.  Getting EGD today.  pain improved today, appears at or near baseline - has only had 1 dose of dilaudid so far and no oxycodone.  Stopping dilaudid, slowing oxycodone to every 8 hours if needed, try to minimize use.  EGD showed significant gastritis and some esophagitis but less pronounced - biopsies taken for possible eosinophilic esophagitis but looks most like gastritis per surgery.    3/23/2021 -- Change IV Protonix to 40 mg po bid.  Discontinue famotidine.  Start carafate 1 g qid.     Gastroesophageal reflux disease  Eosinophilic esophagitis  3/21/21 -- Has been having worsened GERD symptoms, which could be the cause of her nausea, vomiting and abdominal pain as above - will change home protonix to IV twice daily and add IV famotidine.     Eosinophilic esophagitis could also be the primary etiology here - will start with aggressive treatment of her GERD.   3/22/2021 -- EGD consistent with gastritis and possible esophagitis as above - continue IV protonix and famotidine, likely discharge on oral of both of these to start, follow-up with GI and PCP for possible eosinophilic esophagitis pending pathology results.       Chronic constipation  3/21/2021 -- reports last bowel movement 2 weeks ago.  Will attempt to continue home docusate, miralax and senna as tolerated with dulcolax suppository ordered prn as well.  Unclear if this is the cause of her vomiting and abdominal pain or the result of not eating for the past week on top of her baseline constipation, but didn't appear to have a large amount of stool on CT.   3/22/2021 -- no stool yet   3/23/2021 -- Remains constipated.  Encouraged Miralax, Dulcolax, and Pericolace.  Continue PTA plecanatide.    Lactic acidosis   3/21/21 -- suspect due to dehydration.  Certainly nothing here for sepsis, and does not have pain much beyond baseline with benign exam - does not look like ischemic  bowel.  Recheck of lactic with VBG after bolus in ER showed improved lactic, mild acidosis but suspect will resolve overnight with rehydration.   3/22/2021 -- resolved.       Hypokalemia  Hypophosphatemia   3/21/2021 -- due to vomiting and poor intake past week, started on replacement protocol.     3/22/2021 -- improving, resuming diet.   3/23/2021 -- Phos is 1.1, patient should remain in hospital until repleted further.     Thrombocytopenia  3/22/2021 -- suspect just dilutional but down a fair bit, follow for now.       Type 2 diabetes mellitus with mild nonproliferative retinopathy without macular edema, without long-term current use of insulin, unspecified laterality (H)  A1c 11.8 in 1/21 - rechecking A1c.  Hold home trulicity, metformin and victoza.  Placing on high dose NPO sliding scale insulin.   - Change to high dose insulin sliding scale qac and at bedtime on 3/23     Essential hypertension, benign  Blood pressure stable, continue losartan substituted for telmisartan     Hyperlipidemia LDL goal <100  Continue home crestor      Cystic lesions spleen  Stable per radiology from recent CT.       Asymptomatic COVID screen:    Prophylaxis: Mechaincal for now  Lines: PIV  Diet: Liquids, then NPO at midnight.     Disposition  Hope for discharge tomorrow if electrolytes normalized and tolerating oral intake and oral management of pain/nausea.      Jose Alfredo Biggs    Interval History   The patient states that her appetite has improved.  She questions why gastritis would develop now after so many years of abdominal discomfort.  No stool in several days.    -Data reviewed today: I reviewed all new labs and imaging results over the last 24 hours. I personally reviewed no images or EKG's today.    Physical Exam   Temp: 97.4  F (36.3  C) Temp src: Oral BP: 101/69 Pulse: 93   Resp: 18 SpO2: 95 % O2 Device: None (Room air)    Vitals:    03/22/21 0542 03/22/21 1649 03/23/21 0930   Weight: 66.2 kg (145 lb 15.1 oz) 69.6 kg  (153 lb 7 oz) 66.3 kg (146 lb 1.6 oz)     Vital Signs with Ranges  Temp:  [97.4  F (36.3  C)-99  F (37.2  C)] 97.4  F (36.3  C)  Pulse:  [80-94] 93  Resp:  [14-18] 18  BP: ()/(47-69) 101/69  SpO2:  [94 %-97 %] 95 %  I/O last 3 completed shifts:  In: 2727.08 [P.O.:600; I.V.:2127.08]  Out: 1450 [Urine:1450]    Gen: Well nourished, mildly debilitated, alert and oriented x 3, no acute distressed, odd affect  HEENT: Atraumatic, normocephalic; sclera non-injected, anicterric; oral mucosa moist, no lesion, no exudate  Lungs: Clear to ausculation, no wheezes, no rhonchi, no rales  Heart: Regular rate, regular rhythm, no gallops, no rubs, no murmurs  GI: Bowel sound normal, no hepatosplenomegaly, no masses, non-tender, non-distended, no guarding, no rebound tenderness  Lymph: No lymphadenopathy, no edema  Skin: No rashes, no chronic venous stasis     Medications     lactated ringers 125 mL/hr at 03/22/21 2237     - MEDICATION INSTRUCTIONS -       - MEDICATION INSTRUCTIONS -         carvedilol  6.25 mg Oral BID w/meals     insulin aspart  1-12 Units Subcutaneous Q4H     losartan  50 mg Oral Daily     miconazole   Topical BID     pantoprazole (PROTONIX) IV  40 mg Intravenous BID     plecanatide  3 mg Oral Daily     polyethylene glycol  17 g Oral Daily     potassium & sodium phosphates  2 packet Oral or Feeding Tube TID     potassium chloride  40 mEq Oral or Feeding Tube Once     rosuvastatin  20 mg Oral QPM     sincalide in 0.9% sodium chloride 3 mL injection  0.02 mcg/kg Intravenous Once     sucralfate  1 g Oral 4x Daily AC & HS     topiramate  100 mg Oral QPM       Data   Recent Labs   Lab 03/23/21  0521 03/22/21  0508 03/21/21  2253 03/21/21  1215 03/21/21  1215   WBC 3.2* 5.2  --   --  7.8   HGB 11.7 12.9  --   --  17.1*   MCV 86 86  --   --  87   * 141*  --   --  255    140  --   --  134   POTASSIUM 3.4  3.4 3.3* 3.4   < > 3.0*   CHLORIDE 111* 108  --   --  96   CO2 25 20  --   --  19*   BUN 12 17  --    --  31*   CR 0.73 0.70  --   --  0.93   ANIONGAP 7 12  --   --  19*   MANOJ 8.1* 8.1*  --   --  9.4   * 100*  --   --  216*   ALBUMIN 2.5* 2.7*  --   --  4.1   PROTTOTAL 5.1* 5.5*  --   --  8.3   BILITOTAL 0.6 0.8  --   --  1.1   ALKPHOS 69 60  --   --  102   ALT 16 17  --   --  28   AST 11 12  --   --  20   LIPASE  --   --   --   --  293    < > = values in this interval not displayed.       Recent Results (from the past 24 hour(s))   NM HepatOBiliary Scan    Narrative    NM HEPATOBILIARY SCAN 3/22/2021 1:42 PM    HISTORY: Right upper quadrant pain.    PROCEDURE: 4.7 mCi of Tc 99m Mebrofenin is given intravenously for  this study.    FINDINGS: Gallbladder activity is identified at 35 minutes into the  study. Bowel activity is identified at 10 minutes into the study.  The  gallbladder fills with radiotracer and quickly empties out before a  gallbladder ejection fraction portion of the study can be performed.  This occurred twice. Therefore, a formal gallbladder ejection fraction  cannot be calculated but it is suspected to be within normal limits.      Impression    IMPRESSION:  1. Normal visualization of gallbladder.  2. Gallbladder ejection fraction is within normal limits, as described  above.    RASHEL CHRISTIANSON MD

## 2021-03-23 NOTE — PLAN OF CARE
Problem: Adult Inpatient Plan of Care  Goal: Readiness for Transition of Care  Outcome: Improving     Off unit for hepatobiliary scan and EGD.  Kept NPO until after test and procedure.  IV fluids infusing per MAR.  Potassium and phosphorus replaced with oral packets per replacement protocol.  Blood glucose 99, 128, and 246 (5 units given for last reading per sliding scale).  Calm and pleasant, no complaints.

## 2021-03-23 NOTE — PROGRESS NOTES
Pt resting comfortably in room at this time.  Pt requests discharging physician and nurse to both review her home medications (both new and existing) for discharge.  Pt states there have been a lot of changes and she would like to review them.  Note written on board in room and in chart for physician to review.

## 2021-03-23 NOTE — PROGRESS NOTES
Potassium 3.4, replacement ordered per policy. Phosphorus 1.1, replacement ordered per policy.     Rae Wade RN 3/23/2021 6:30 AM

## 2021-03-23 NOTE — PLAN OF CARE
Pt slept well overnight. Up to bathroom with SBA. Voiding with out issues. Still without bowel movement, pt offered miralax and dulcolax suppository. Pt declines any bowel medications at this time. Bowel sounds are active in each quadrant. Pt tolerated peanut butter toast and sherbet before bed last night. Vitals stable, see flowsheet. Pt alert, oriented and cooperative. Will continue to monitor.     Rae Wade RN 3/23/2021 6:33 AM

## 2021-03-23 NOTE — PLAN OF CARE
This writer assumes care of pt at 1515 hours.  Pt assessment wnl, vss.  Pt independent with position changes in room.  Pt alert and orientated and advised to call for assistance when needed to get up.  Pt complains of constipation. Pt medicated per MAR for constipation.  IV flushed and found to be leaking.  IV removed.  No additional IV medication ordered.  Pt aware of possible need to replace iv if additional mediations are ordered.  Pt aware and agrees with plan.      Plan: continue to monitor and assess, medicate per MAR.

## 2021-03-23 NOTE — PLAN OF CARE
Major Shift Events: Assigned patient from 1900 to 2135. Patient alert and oriented x4. VSS. Denies pain. Ambulated with assist of one. Reports nausea and abdominal discomfort to be getting better. Previous RN reported that potassium and phosphorus were to be rechecked with AM labs at 0600. Bedtime insulin administered per sliding scale order. Declines Micatin powder for groin rash. Patient reports that she has no further questions or concerns at this time.     Plan: Continue with plan of care and notify MD of any concerns.  Transfer to OB as overflow med/surg patient due to high census.   For vital signs and complete assessments, please see documentation flowsheets.       [facility] TRANSPORT NOTE  Data:   Reason for Transport:  Med/Surg overflow to OB     Lorrie Blake was transported to OB 2043 via wheel chair at 2135.  Patient was accompanied by Registered Nurse and Nursing Assistant. Equipment used for transport: None. Family was aware of reason for transport: no    Action:  Report: given to Rae MARSH    Response:  Patient's condition when transferred off unit was Stable.    Elvie Del Cid RN

## 2021-03-24 LAB
ALBUMIN SERPL-MCNC: 2.9 G/DL (ref 3.4–5)
ALP SERPL-CCNC: 88 U/L (ref 40–150)
ALT SERPL W P-5'-P-CCNC: 17 U/L (ref 0–50)
ANION GAP SERPL CALCULATED.3IONS-SCNC: 3 MMOL/L (ref 3–14)
AST SERPL W P-5'-P-CCNC: 10 U/L (ref 0–45)
BASOPHILS # BLD AUTO: 0 10E9/L (ref 0–0.2)
BASOPHILS NFR BLD AUTO: 0.7 %
BILIRUB DIRECT SERPL-MCNC: 0.2 MG/DL (ref 0–0.2)
BILIRUB SERPL-MCNC: 0.6 MG/DL (ref 0.2–1.3)
BUN SERPL-MCNC: 14 MG/DL (ref 7–30)
CALCIUM SERPL-MCNC: 8.4 MG/DL (ref 8.5–10.1)
CHLORIDE SERPL-SCNC: 111 MMOL/L (ref 94–109)
CO2 SERPL-SCNC: 30 MMOL/L (ref 20–32)
COPATH REPORT: NORMAL
CREAT SERPL-MCNC: 0.77 MG/DL (ref 0.52–1.04)
DEPRECATED CALCIDIOL+CALCIFEROL SERPL-MC: 25 UG/L (ref 20–75)
DIFFERENTIAL METHOD BLD: ABNORMAL
EOSINOPHIL # BLD AUTO: 0.1 10E9/L (ref 0–0.7)
EOSINOPHIL NFR BLD AUTO: 1.7 %
ERYTHROCYTE [DISTWIDTH] IN BLOOD BY AUTOMATED COUNT: 12.8 % (ref 10–15)
GFR SERPL CREATININE-BSD FRML MDRD: 83 ML/MIN/{1.73_M2}
GLUCOSE BLDC GLUCOMTR-MCNC: 238 MG/DL (ref 70–99)
GLUCOSE BLDC GLUCOMTR-MCNC: 282 MG/DL (ref 70–99)
GLUCOSE BLDC GLUCOMTR-MCNC: 293 MG/DL (ref 70–99)
GLUCOSE BLDC GLUCOMTR-MCNC: 434 MG/DL (ref 70–99)
GLUCOSE BLDC GLUCOMTR-MCNC: 498 MG/DL (ref 70–99)
GLUCOSE SERPL-MCNC: 214 MG/DL (ref 70–99)
HCT VFR BLD AUTO: 38.5 % (ref 35–47)
HGB BLD-MCNC: 12.6 G/DL (ref 11.7–15.7)
IMM GRANULOCYTES # BLD: 0 10E9/L (ref 0–0.4)
IMM GRANULOCYTES NFR BLD: 0 %
LYMPHOCYTES # BLD AUTO: 1.2 10E9/L (ref 0.8–5.3)
LYMPHOCYTES NFR BLD AUTO: 42 %
MAGNESIUM SERPL-MCNC: 2 MG/DL (ref 1.6–2.3)
MCH RBC QN AUTO: 28.4 PG (ref 26.5–33)
MCHC RBC AUTO-ENTMCNC: 32.7 G/DL (ref 31.5–36.5)
MCV RBC AUTO: 87 FL (ref 78–100)
MONOCYTES # BLD AUTO: 0.2 10E9/L (ref 0–1.3)
MONOCYTES NFR BLD AUTO: 7.3 %
NEUTROPHILS # BLD AUTO: 1.4 10E9/L (ref 1.6–8.3)
NEUTROPHILS NFR BLD AUTO: 48.3 %
NRBC # BLD AUTO: 0 10*3/UL
NRBC BLD AUTO-RTO: 0 /100
PHOSPHATE SERPL-MCNC: 1.4 MG/DL (ref 2.5–4.5)
PHOSPHATE SERPL-MCNC: 1.6 MG/DL (ref 2.5–4.5)
PLATELET # BLD AUTO: 121 10E9/L (ref 150–450)
POTASSIUM SERPL-SCNC: 4.3 MMOL/L (ref 3.4–5.3)
PROT SERPL-MCNC: 5.8 G/DL (ref 6.8–8.8)
PTH-INTACT SERPL-MCNC: 41 PG/ML (ref 18–80)
RBC # BLD AUTO: 4.43 10E12/L (ref 3.8–5.2)
SODIUM SERPL-SCNC: 144 MMOL/L (ref 133–144)
WBC # BLD AUTO: 2.9 10E9/L (ref 4–11)

## 2021-03-24 PROCEDURE — 250N000013 HC RX MED GY IP 250 OP 250 PS 637: Performed by: FAMILY MEDICINE

## 2021-03-24 PROCEDURE — 83735 ASSAY OF MAGNESIUM: CPT | Performed by: INTERNAL MEDICINE

## 2021-03-24 PROCEDURE — 84100 ASSAY OF PHOSPHORUS: CPT | Performed by: INTERNAL MEDICINE

## 2021-03-24 PROCEDURE — 82306 VITAMIN D 25 HYDROXY: CPT | Performed by: INTERNAL MEDICINE

## 2021-03-24 PROCEDURE — 36415 COLL VENOUS BLD VENIPUNCTURE: CPT | Performed by: INTERNAL MEDICINE

## 2021-03-24 PROCEDURE — 250N000013 HC RX MED GY IP 250 OP 250 PS 637: Performed by: INTERNAL MEDICINE

## 2021-03-24 PROCEDURE — 80048 BASIC METABOLIC PNL TOTAL CA: CPT | Performed by: INTERNAL MEDICINE

## 2021-03-24 PROCEDURE — 120N000001 HC R&B MED SURG/OB

## 2021-03-24 PROCEDURE — 85025 COMPLETE CBC W/AUTO DIFF WBC: CPT | Performed by: INTERNAL MEDICINE

## 2021-03-24 PROCEDURE — 999N001017 HC STATISTIC GLUCOSE BY METER IP

## 2021-03-24 PROCEDURE — 80076 HEPATIC FUNCTION PANEL: CPT | Performed by: INTERNAL MEDICINE

## 2021-03-24 PROCEDURE — 83970 ASSAY OF PARATHORMONE: CPT | Performed by: INTERNAL MEDICINE

## 2021-03-24 PROCEDURE — 99232 SBSQ HOSP IP/OBS MODERATE 35: CPT | Performed by: INTERNAL MEDICINE

## 2021-03-24 RX ADMIN — ROSUVASTATIN CALCIUM 20 MG: 20 TABLET, FILM COATED ORAL at 17:30

## 2021-03-24 RX ADMIN — TOPIRAMATE 100 MG: 100 TABLET, FILM COATED ORAL at 17:30

## 2021-03-24 RX ADMIN — SUCRALFATE 1 G: 1 SUSPENSION ORAL at 11:49

## 2021-03-24 RX ADMIN — PANTOPRAZOLE SODIUM 40 MG: 40 TABLET, DELAYED RELEASE ORAL at 06:40

## 2021-03-24 RX ADMIN — PANTOPRAZOLE SODIUM 40 MG: 40 TABLET, DELAYED RELEASE ORAL at 17:30

## 2021-03-24 RX ADMIN — LOSARTAN POTASSIUM 50 MG: 50 TABLET, FILM COATED ORAL at 08:16

## 2021-03-24 RX ADMIN — MICONAZOLE NITRATE: 20 POWDER TOPICAL at 20:14

## 2021-03-24 RX ADMIN — SUCRALFATE 1 G: 1 SUSPENSION ORAL at 17:29

## 2021-03-24 RX ADMIN — CARVEDILOL 6.25 MG: 6.25 TABLET, FILM COATED ORAL at 17:30

## 2021-03-24 RX ADMIN — POTASSIUM & SODIUM PHOSPHATES POWDER PACK 280-160-250 MG 2 PACKET: 280-160-250 PACK at 08:42

## 2021-03-24 RX ADMIN — SUCRALFATE 1 G: 1 SUSPENSION ORAL at 21:28

## 2021-03-24 RX ADMIN — METFORMIN HYDROCHLORIDE 1000 MG: 500 TABLET ORAL at 17:29

## 2021-03-24 RX ADMIN — POTASSIUM & SODIUM PHOSPHATES POWDER PACK 280-160-250 MG 2 PACKET: 280-160-250 PACK at 20:14

## 2021-03-24 RX ADMIN — CARVEDILOL 6.25 MG: 6.25 TABLET, FILM COATED ORAL at 08:43

## 2021-03-24 RX ADMIN — SUCRALFATE 1 G: 1 SUSPENSION ORAL at 06:40

## 2021-03-24 RX ADMIN — MICONAZOLE NITRATE: 20 POWDER TOPICAL at 08:28

## 2021-03-24 RX ADMIN — POLYETHYLENE GLYCOL 3350 17 G: 17 POWDER, FOR SOLUTION ORAL at 08:42

## 2021-03-24 ASSESSMENT — ACTIVITIES OF DAILY LIVING (ADL)
ADLS_ACUITY_SCORE: 17
ADLS_ACUITY_SCORE: 17

## 2021-03-24 ASSESSMENT — MIFFLIN-ST. JEOR: SCORE: 1176.5

## 2021-03-24 NOTE — PROGRESS NOTES
[facility]    Hospitalist Progress Note    Date of Service (when I saw the patient): 03/24/2021    Assessment & Plan   Lorrie Blake is a 60 year old female who was admitted on 3/21/2021 with history of chronic abdominal pain as below who presents with 1 week of nausea and vomiting.  Pleasant but very challenging historian.       Intractable ausea and vomiting due to gastritis  3/21/2021 -- Sounds like this is her primary new symptom.  Hard historian but sounds like it's been about 1 week with emesis a few times per day and no oral intake other than sips of fluids as a result.  Not taking any home medications as a result either.  CT negative other than some gallbladder sludging.  Assessing that as below.  Nothing for obstruction here.  Could be gastroenteritis but no diarrhea.  Could also be uncontrolled GERD with patient not taking her medications - treat this as below.  For now will rehydrate with LR at 125/h, evaluate abdominal pain as below.  Will start on zofran and compazine prn.  Ok for clears as tolerated tonight, but NPO at midnight for work-up of abdominal pain as below.     - Nausea much improved after IV zofran, no emesis since admission.   Stopping IV zofran, continue ODT zofran prn.  Advance diet as tolerated.    - Tolerating regular diet without difficulty on 3/23     Abdominal pain, epigastric with history of Chronic abdominal pain   Possible eosinophilic esophagitis  Irritable bowel syndrome  Has had abdominal pain since childhood, hard to compare current symptoms to baseline, sounds like somewhat worse with vomiting recently and perhaps more in the right upper quadrant but really hard to tell how much of this is new vs chronic.  Labs normal and CT negative other than gallbladder sludging as above.   - Getting US of gallbladder.  Follow labs.  Dilaudid prn for pain, If no clear cholecystitis on US will likely check HIDA scan.  Surgery will see tomorrow.  Continue home  trulance for IBS.    -- HIDA scan normal 3/22.  Getting EGD today.  pain improved today, appears at or near baseline - has only had 1 dose of dilaudid so far and no oxycodone.  Stopping dilaudid, slowing oxycodone to every 8 hours if needed, try to minimize use.  EGD showed significant gastritis and some esophagitis but less pronounced - biopsies taken for possible eosinophilic esophagitis but looks most like gastritis per surgery.    - Change IV Protonix to 40 mg po bid.  Discontinue famotidine.  Start carafate 1 g qid on 3/23  - Patient reports mild reflux on 3/24, but overall dyspepsia significantly improved     Gastroesophageal reflux disease  Eosinophilic esophagitis  Has been having worsened GERD symptoms, which could be the cause of her nausea, vomiting and abdominal pain as above - will change home protonix to IV twice daily and add IV famotidine.     Eosinophilic esophagitis could also be the primary etiology here - will start with aggressive treatment of her GERD.   - EGD consistent with gastritis and possible esophagitis as above - continue IV protonix and famotidine, likely discharge on oral of both of these to start, follow-up with GI and PCP for possible eosinophilic esophagitis pending pathology results.       Chronic constipation  Reports last bowel movement 2 weeks ago.  Will attempt to continue home docusate, miralax and senna as tolerated with dulcolax suppository ordered prn as well.  Unclear if this is the cause of her vomiting and abdominal pain or the result of not eating for the past week on top of her baseline constipation, but didn't appear to have a large amount of stool on CT.   - Remains constipated on 3/24.  Encouraged Miralax, Dulcolax, and Pericolace.  Continue PTA plecanatide.    Lactic acidosis   Suspect due to dehydration on admission.  Certainly nothing here for sepsis, and does not have pain much beyond baseline with benign exam - does not look like ischemic bowel.  Recheck  of lactic with VBG after bolus in ER showed improved lactic, mild acidosis but suspect will resolve overnight with rehydration.   - Resolved, Lactate 0.8 on AM of 3/22.       Hypokalemia  Hypophosphatemia   Due to vomiting and poor intake past week, started on replacement protocol.     - Phos is 1.1 on 3/23, patient should remain in hospital until repleted further.   - Phos is 1.6 on 3/23, patient should remain in hospital until repleted further.      Thrombocytopenia  Suspect just dilutional but down a fair bit, follow for now.       Type 2 diabetes mellitus with mild nonproliferative retinopathy without macular edema, without long-term current use of insulin, unspecified laterality (H)  A1c 11.8 in 1/21 - rechecking A1c.  Hold home metformin and victoza on admission.  Placing on high dose NPO sliding scale insulin.  - Change to high dose insulin sliding scale qac and at bedtime on 3/23  - Restart metformin on 3/24     Essential hypertension, benign  Blood pressure stable, continue losartan substituted for telmisartan     Hyperlipidemia LDL goal <100  Continue home crestor      Cystic lesions spleen  Stable per radiology from recent CT.       Asymptomatic COVID screen:    Prophylaxis: Mechanical for now  Lines: PIV  Diet: Regular    Disposition  Hope for discharge tomorrow if electrolytes normalized and tolerating oral intake and oral management of pain/nausea.      Jose Alfredo Biggs    Interval History   The patient states that she notes significant improvement in gastritis with carafate.  No other specific complaints.  Wants to discharge but understands need to stay.  Will ambulate in halls.    -Data reviewed today: I reviewed all new labs and imaging results over the last 24 hours. I personally reviewed no images or EKG's today.    Physical Exam   Temp: 97.5  F (36.4  C) Temp src: Oral BP: 127/83 Pulse: 82   Resp: 18        Vitals:    03/22/21 0542 03/22/21 1649 03/23/21 0930   Weight: 66.2 kg (145 lb 15.1 oz)  69.6 kg (153 lb 7 oz) 66.3 kg (146 lb 1.6 oz)     Vital Signs with Ranges  Temp:  [97.5  F (36.4  C)-98.3  F (36.8  C)] 97.5  F (36.4  C)  Pulse:  [76-86] 82  Resp:  [16-18] 18  BP: ()/(55-83) 127/83  I/O last 3 completed shifts:  In: 2775 [P.O.:1900; I.V.:875]  Out: 3900 [Urine:3900]    Gen: Well nourished, mildly debilitated, alert and oriented x 3, no acute distressed  HEENT: Atraumatic, normocephalic; sclera non-injected, anicterric; oral mucosa moist, no lesion, no exudate  Lungs: Clear to ausculation, no wheezes, no rhonchi, no rales  Heart: Regular rate, regular rhythm, no gallops, no rubs, no murmurs  GI: Bowel sound normal, no hepatosplenomegaly, no masses, non-tender, non-distended, no guarding, no rebound tenderness  Lymph: No lymphadenopathy, no edema  Skin: No rashes, no chronic venous stasis     Medications     - MEDICATION INSTRUCTIONS -       - MEDICATION INSTRUCTIONS -         carvedilol  6.25 mg Oral BID w/meals     insulin aspart  1-10 Units Subcutaneous TID AC     insulin aspart  1-7 Units Subcutaneous At Bedtime     losartan  50 mg Oral Daily     miconazole   Topical BID     pantoprazole  40 mg Oral BID AC     plecanatide  3 mg Oral Daily     polyethylene glycol  17 g Oral Daily     potassium chloride  40 mEq Oral or Feeding Tube Once     rosuvastatin  20 mg Oral QPM     sincalide in 0.9% sodium chloride 3 mL injection  0.02 mcg/kg Intravenous Once     sucralfate  1 g Oral 4x Daily AC & HS     topiramate  100 mg Oral QPM       Data   Recent Labs   Lab 03/24/21  0522 03/23/21  2046 03/23/21  0521 03/22/21  0508 03/21/21  1215 03/21/21  1215   WBC 2.9*  --  3.2* 5.2  --  7.8   HGB 12.6  --  11.7 12.9  --  17.1*   MCV 87  --  86 86  --  87   *  --  113* 141*  --  255     --  143 140  --  134   POTASSIUM 4.3 4.8 3.4  3.4 3.3*   < > 3.0*   CHLORIDE 111*  --  111* 108  --  96   CO2 30  --  25 20  --  19*   BUN 14  --  12 17  --  31*   CR 0.77  --  0.73 0.70  --  0.93   ANIONGAP 3   --  7 12  --  19*   MANOJ 8.4*  --  8.1* 8.1*  --  9.4   *  --  161* 100*  --  216*   ALBUMIN 2.9*  --  2.5* 2.7*  --  4.1   PROTTOTAL 5.8*  --  5.1* 5.5*  --  8.3   BILITOTAL 0.6  --  0.6 0.8  --  1.1   ALKPHOS 88  --  69 60  --  102   ALT 17  --  16 17  --  28   AST 10  --  11 12  --  20   LIPASE  --   --   --   --   --  293    < > = values in this interval not displayed.       No results found for this or any previous visit (from the past 24 hour(s)).

## 2021-03-24 NOTE — PROGRESS NOTES
CLINICAL NUTRITION SERVICES - BRIEF NOTE       Nutrition Prescription    RECOMMENDATIONS FOR MDs/PROVIDERS TO ORDER:  Pt may benefit from referral to out patient Dietitian for help with diet and IBS/GERD     Recommendations already ordered by Registered Dietitian (RD):  -[place] instant breakfast chocolate flavor mixed with 1% milk as HS snack.  -diet order: no coffee, spicy or acidic foods per patient request.    Future/Additional Recommendations:  -continue to monitor and encourage oral intake       Met with patient and RN this morning to discuss intake. She's eating ok, ate 50% x3 meals yesterday. She doesn't drink coffee, and doesn't want any acidic or spicy foods. She's willing to try [place] instant breakfast chocolate flavor mixed with 1% milk as HS snack. Encouraged patient to schedule an OP visit after discharge to further discuss diet. Also encouraged Ensure, Boost or [place] after discharge to help with intake. Discussed the following handouts: GERD Nutrition Therapy, Fiber Content of Foods and Fill in the Fiber Gaps: Practical Ways to Add Fiber to Your Day.       Phos low again - 1.6. Patient will be here another day because of this.      Recommendations:  -diet order and supplements as above.  -continue to monitor and encourage oral intake.      Renea Knox RDN, LD  Clinical Dietitian  Office: [phone]  Saturday/Sunday Pager: [phone]

## 2021-03-24 NOTE — PLAN OF CARE
Pt slept most of the night complaining of no pain. VSS, see flowsheet. Voided once and no bowel movement. Pt states that she is feeling much better this morning. Pt has her PCD's on and instructed pt to all when needing to void. Call light in reach. Will cont to monitor.

## 2021-03-24 NOTE — PLAN OF CARE
Pt is up independently in room. Denies pain. Was able to have a few large bowel movements today. The last one had some bright red blood. Per pt she said she has hemorrhoids.   Last phosphorous check went down to 1.4- this was reordered again, will be due for another packet at 2000.   Pt is anxious to go home and is hopeful for tomorrow morning.   /66   Pulse 84   Temp 98.4  F (36.9  C) (Oral)   Resp 18   Ht 1.524 m (5')   Wt 68.5 kg (151 lb 0.2 oz)   SpO2 96%   BMI 29.49 kg/m    Juhi Sosa RN BSN

## 2021-03-24 NOTE — UTILIZATION REVIEW
Admission Status; Secondary Review Determination       Under the authority of the Utilization Management Committee, the utilization review process indicated a secondary review on the above patient. The review outcome is based on review of the medical records, discussions with staff, and applying clinical experience noted on the date of the review.     (x) Inpatient Status Appropriate - This patient's medical care is consistent with medical management for inpatient care and reasonable inpatient medical practice.     RATIONALE FOR DETERMINATION   61 yo female with diabetes, GERD, dyslipidemia, chronic constipation, irritable bowel syndrome, initially admitted 3/21/21 for nausea, vomiting and acute on chronic abdominal pain.  Surgical consultation obtained and imaging has been unrevealing, including nuclear medicine hepatobiliary scan, abdominal ultrasound, abdominal CT.  She has had significant electrolyte abnormalities including ongoing severe hypophosphatemia, as low as 1.1 on 3/23/2021.  It is still not normalized despite ongoing, aggressive phosphorus replacement.    She has been in the hospital for 3 nights already and is not discharging today due to her ongoing electrolyte abnormalities.     With the information available to the attending physician and via other documentation in the chart, additional inpatient care is needed. Inpatient admission is appropriate based on the Medicare guidelines.     Contacted Dr. Biggs about this recommendation via [facility] paging.     This document was produced using voice recognition software.    The information on this document is developed by the utilization review team in order for the business office to ensure compliance. This only denotes the appropriateness of proper admission status and does not reflect the quality of care rendered.   The definitions of Inpatient Status and Observation Status used in making the determination above are those provided in the CMS Coverage  Manual, Chapter 1 and Chapter 6, section 70.4.     Sincerely,   Torie Mullins MD  Utilization Review  Physician Advisor  [facility].

## 2021-03-24 NOTE — PROGRESS NOTES
Md Perez contacted by pager/web messaging.  Md updated with night time BG of 370.  Per MD ok to give 5 additional units of sliding scale insulin.  MD will place order

## 2021-03-24 NOTE — PLAN OF CARE
"Dr at bedside talking to patient about another night in hospital due to low phosphorus level 1.6. 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 Expressed to patient she needs to care for self so she can continue caring for her parents. States she has not had BM for 3 weeks. Has active BS and soft abdomen. Had 2 large soft BM's. Dietician talked with patient about increasing fiber, frequent small meals, and adding ensure. \"I usually don't eat breakfast and would be hard for me to have small meals or snacks, I'm busy taking care of everyone else.\"  Ate almost everything on both brk and lunch tray, denies nausea and pain. Blood sugar 434 prior to lunch,  notified 22 units given. Asked patient how many times a day she takes her blood sugar and she said I usually don't.  "

## 2021-03-25 ENCOUNTER — PATIENT OUTREACH (OUTPATIENT)
Dept: CARE COORDINATION | Facility: CLINIC | Age: 61
End: 2021-03-25

## 2021-03-25 VITALS
TEMPERATURE: 97.9 F | SYSTOLIC BLOOD PRESSURE: 119 MMHG | HEIGHT: 60 IN | DIASTOLIC BLOOD PRESSURE: 79 MMHG | RESPIRATION RATE: 18 BRPM | BODY MASS INDEX: 29.43 KG/M2 | HEART RATE: 84 BPM | OXYGEN SATURATION: 96 % | WEIGHT: 149.91 LBS

## 2021-03-25 LAB
GLUCOSE BLDC GLUCOMTR-MCNC: 234 MG/DL (ref 70–99)
GLUCOSE BLDC GLUCOMTR-MCNC: 242 MG/DL (ref 70–99)
PHOSPHATE SERPL-MCNC: 2.1 MG/DL (ref 2.5–4.5)

## 2021-03-25 PROCEDURE — 999N001017 HC STATISTIC GLUCOSE BY METER IP

## 2021-03-25 PROCEDURE — 250N000013 HC RX MED GY IP 250 OP 250 PS 637: Performed by: FAMILY MEDICINE

## 2021-03-25 PROCEDURE — 250N000013 HC RX MED GY IP 250 OP 250 PS 637: Performed by: INTERNAL MEDICINE

## 2021-03-25 PROCEDURE — 84100 ASSAY OF PHOSPHORUS: CPT | Performed by: INTERNAL MEDICINE

## 2021-03-25 PROCEDURE — 99239 HOSP IP/OBS DSCHRG MGMT >30: CPT | Performed by: INTERNAL MEDICINE

## 2021-03-25 PROCEDURE — 36415 COLL VENOUS BLD VENIPUNCTURE: CPT | Performed by: INTERNAL MEDICINE

## 2021-03-25 RX ORDER — SUCRALFATE ORAL 1 G/10ML
1 SUSPENSION ORAL
Qty: 1200 ML | Refills: 0 | Status: SHIPPED | OUTPATIENT
Start: 2021-03-25 | End: 2021-03-29

## 2021-03-25 RX ORDER — PANTOPRAZOLE SODIUM 40 MG/1
40 TABLET, DELAYED RELEASE ORAL
Qty: 60 TABLET | Refills: 0 | Status: SHIPPED | OUTPATIENT
Start: 2021-03-25

## 2021-03-25 RX ADMIN — POTASSIUM & SODIUM PHOSPHATES POWDER PACK 280-160-250 MG 2 PACKET: 280-160-250 PACK at 08:24

## 2021-03-25 RX ADMIN — MICONAZOLE NITRATE: 20 POWDER TOPICAL at 08:27

## 2021-03-25 RX ADMIN — PANTOPRAZOLE SODIUM 40 MG: 40 TABLET, DELAYED RELEASE ORAL at 05:58

## 2021-03-25 RX ADMIN — LOSARTAN POTASSIUM 50 MG: 50 TABLET, FILM COATED ORAL at 08:24

## 2021-03-25 RX ADMIN — METFORMIN HYDROCHLORIDE 1000 MG: 500 TABLET ORAL at 08:24

## 2021-03-25 RX ADMIN — POLYETHYLENE GLYCOL 3350 17 G: 17 POWDER, FOR SOLUTION ORAL at 08:24

## 2021-03-25 RX ADMIN — CARVEDILOL 6.25 MG: 6.25 TABLET, FILM COATED ORAL at 08:24

## 2021-03-25 RX ADMIN — SUCRALFATE 1 G: 1 SUSPENSION ORAL at 05:58

## 2021-03-25 ASSESSMENT — ACTIVITIES OF DAILY LIVING (ADL)
ADLS_ACUITY_SCORE: 17

## 2021-03-25 ASSESSMENT — MIFFLIN-ST. JEOR: SCORE: 1171.5

## 2021-03-25 NOTE — LETTER
[facility] CARE COORDINATION  March 26, 2021    Lorrie Blake  [address], [place], [facility] [address]      Dear Lorrie,    I am a clinic community health worker who works with NOLVIA Lazaro CNP at [facility]. I have been trying to reach you recently to introduce Clinic Care Coordination and to see if there was anything I could assist you with.  I recently tried to call and was unable to reach you. Below is a description of clinic care coordination and how I can further assist you.      The clinic care coordination team is made up of a registered nurse,  and community health worker who understand the health care system. The goal of clinic care coordination is to help you manage your health and improve access to the health care system in the most efficient manner. The team can assist you in meeting your health care goals by providing education, coordinating services, strengthening the communication among your providers and supporting you with any resource needs.    Please feel free to contact the Community Health Worker at [phone] with any questions or concerns. We are focused on providing you with the highest-quality healthcare experience possible and that all starts with you.     Sincerely,     Ave Prather   Community Health Worker   Ph: [phone]  Fx: [phone]

## 2021-03-25 NOTE — DISCHARGE SUMMARY
[facility]  Hospitalist Discharge Summary       Date of Admission:  3/21/2021  Date of Discharge:  3/25/2021 10:24 AM  Discharging Provider: Jose Alfredo Biggs MD      Discharge Diagnoses     Intractable ausea and vomiting  Abdominal pain, epigastric with history of Chronic abdominal pain   Eosinophilic esophagitis, ruled out  Nonulcer dyspepsia  Irritable bowel syndrome  Gastroesophageal reflux disease  Chronic constipation  Lactic acidosis   Hypokalemia  Hypophosphatemia   Severe malnutrition  Thrombocytopenia  Type 2 diabetes mellitus with mild nonproliferative retinopathy without macular edema, without long-term current use of insulin, unspecified laterality (H)  Essential hypertension, benign  Hyperlipidemia LDL goal <100  Cystic lesions spleen    Follow-ups Needed After Discharge   Follow-up Appointments     Follow-up and recommended labs and tests      Follow up with primary care provider, Mary Kay Pollard, within 7 days for   hospital follow- up.  The following labs/tests are recommended:   Phosphorus, BMP and CBC.           Unresulted Labs Ordered in the Past 30 Days of this Admission     No orders found from 2/19/2021 to 3/22/2021.      These results will be followed up by Jose Alfredo Biggs or PCP    Discharge Disposition   Discharged to home  Condition at discharge: Stable    Hospital Course   Lorrie Blake is a 60 year old female who was admitted on 3/21/2021 with history of chronic abdominal pain as below who presents with 1 week of nausea and vomiting.  Pleasant but very challenging historian.       Intractable ausea and vomiting  3/21/2021 -- Sounds like this is her primary new symptom.  Hard historian but sounds like it's been about 1 week with emesis a few times per day and no oral intake other than sips of fluids as a result.  Not taking any home medications as a result either.  CT negative other than some gallbladder sludging.  Assessing that as below.  Nothing for  obstruction here.  Could be gastroenteritis but no diarrhea.  Could also be uncontrolled GERD with patient not taking her medications - treat this as below.  For now will rehydrate with LR at 125/h, evaluate abdominal pain as below.  Will start on zofran and compazine prn.  Ok for clears as tolerated tonight, but NPO at midnight for work-up of abdominal pain as below.     - Nausea much improved after IV zofran, no emesis since admission.   Stopping IV zofran, continue ODT zofran prn.  Advance diet as tolerated.    - Tolerating regular diet without difficulty on 3/23     Abdominal pain, epigastric with history of Chronic abdominal pain   Eosinophilic esophagitis, ruled out  Nonulcer dyspepsia  Irritable bowel syndrome  Has had abdominal pain since childhood, hard to compare current symptoms to baseline, sounds like somewhat worse with vomiting recently and perhaps more in the right upper quadrant but really hard to tell how much of this is new vs chronic.  Labs normal and CT negative other than gallbladder sludging as above.   - Getting US of gallbladder.  Follow labs.  Dilaudid prn for pain, If no clear cholecystitis on US will likely check HIDA scan.  Surgery will see tomorrow.  Continue home trulance for IBS.    -- HIDA scan normal 3/22.  Getting EGD today.  pain improved today, appears at or near baseline - has only had 1 dose of dilaudid so far and no oxycodone.  Stopping dilaudid, slowing oxycodone to every 8 hours if needed, try to minimize use.  EGD showed significant gastritis and some esophagitis but less pronounced - biopsies taken for possible eosinophilic esophagitis but looks most like gastritis per surgery.    - Change IV Protonix to 40 mg po bid.  Discontinue famotidine.  Start carafate 1 g qid on 3/23  - Patient reports mild reflux on 3/24, but overall dyspepsia significantly improved  - Esophageal and gastric biopsies normal      Gastroesophageal reflux disease  Eosinophilic esophagitis, ruled  out  Has been having worsened GERD symptoms, which could be the cause of her nausea, vomiting and abdominal pain as above - will change home protonix to IV twice daily and add IV famotidine.     Eosinophilic esophagitis could also be the primary etiology here - will start with aggressive treatment of her GERD.   - EGD consistent with gastritis and possible esophagitis as above - continue IV protonix and famotidine, likely discharge on oral of both of these to start, follow-up with GI and PCP for possible eosinophilic esophagitis pending pathology results.  - Esophageal and gastric biopsies normal      Chronic constipation  Reports last bowel movement 2 weeks ago.  Will attempt to continue home docusate, miralax and senna as tolerated with dulcolax suppository ordered prn as well.  Unclear if this is the cause of her vomiting and abdominal pain or the result of not eating for the past week on top of her baseline constipation, but didn't appear to have a large amount of stool on CT.   - Remains constipated on 3/24.  Encouraged Miralax, Dulcolax, and Pericolace.  Continue PTA plecanatide.    Lactic acidosis   Suspect due to dehydration on admission.  Certainly nothing here for sepsis, and does not have pain much beyond baseline with benign exam - does not look like ischemic bowel.  Recheck of lactic with VBG after bolus in ER showed improved lactic, mild acidosis but suspect will resolve overnight with rehydration.   - Resolved, Lactate 0.8 on AM of 3/22.       Hypokalemia  Hypophosphatemia   Severe malnutrition  Due to vomiting and poor intake past week, started on replacement protocol.   Low phosphorus likely due to poor oral intake.  Evaluated by Nutrition, who note significant recent weight loss.  - Phos is 1.1 on 3/23, patient should remain in hospital until repleted further.   - Phos is 1.6 on 3/24, patient should remain in hospital until repleted further.   - PTH 41, Vitamin D 25  - Phos 2.1 on 3/25, discharged  to home  - Patient encouraged to consume low fat dairy products, as these are high in phosphorus  - Repeat phos level on follow up     Thrombocytopenia  Suspect just dilutional but down a fair bit, follow for now.    - Recheck on follow up with PCP     Type 2 diabetes mellitus with mild nonproliferative retinopathy without macular edema, without long-term current use of insulin, unspecified laterality (H)  A1c 11.8 in 1/21 - rechecking A1c.  Hold home metformin and victoza on admission.  Placing on high dose NPO sliding scale insulin.  - Change to high dose insulin sliding scale qac and at bedtime on 3/23  - Restart metformin on 3/24  - Resart Victoza at discharge     Essential hypertension, benign  Blood pressure stable, continue losartan substituted for telmisartan     Hyperlipidemia LDL goal <100  Continue home crestor      Cystic lesions spleen  Stable per radiology from recent CT.      Consultations This Hospital Stay   NUTRITION SERVICES ADULT IP CONSULT  CARE MANAGEMENT / SOCIAL WORK IP CONSULT  CARE MANAGEMENT / SOCIAL WORK IP CONSULT    Code Status   Full Code    Time Spent on this Encounter   I, Jose Alfredo Biggs MD, personally saw the patient today and spent greater than 30 minutes discharging this patient.       Jose Alfredo Biggs MD  [facility]  ______________________________________________________________________    Physical Exam   Vital Signs: Temp: 97.9  F (36.6  C) Temp src: Oral BP: 119/79 Pulse: 84   Resp: 18 SpO2: 96 % O2 Device: None (Room air)    Weight: 149 lbs 14.6 oz       Gen: Well nourished, well developed, alert and oriented x 3, no acute distressed  HEENT: Atraumatic, normocephalic; sclera non-injected, anicterric; oral mucosa moist, no lesion, no exudate  Lungs: Clear to ausculation, no wheezes, no rhonchi, no rales  Heart: Regular rate, regular rhythm, no gallops, no rubs, no murmurs  GI: Bowel sound normal, no hepatosplenomegaly, no masses, non-tender,  non-distended, no guarding, no rebound tenderness  Lymph: No lymphadenopathy, no edema  Skin: No rashes, no chronic venous stasis     Primary Care Physician   Mary Kay Pollard    Discharge Orders      Reason for your hospital stay    This is a 60 year old female admitted with nausea and abdominal pain due to gastritis.     Follow-up and recommended labs and tests    Follow up with primary care provider, Mary Kay Pollard, within 7 days for hospital follow- up.  The following labs/tests are recommended: Phosphorus, BMP and CBC.     Activity    Your activity upon discharge: activity as tolerated     Diet    Follow this diet upon discharge: Orders Placed This Encounter      Snacks/Supplements Adult: Other; [place] Instant Breakfast; Between Meals      Regular Diet Adult       Significant Results and Procedures   Most Recent 3 CBC's:  Recent Labs   Lab Test 03/24/21 0522 03/23/21 0521 03/22/21  0508   WBC 2.9* 3.2* 5.2   HGB 12.6 11.7 12.9   MCV 87 86 86   * 113* 141*     Most Recent 3 BMP's:  Recent Labs   Lab Test 03/24/21 0522 03/23/21 2046 03/23/21 0521 03/22/21  0508     --  143 140   POTASSIUM 4.3 4.8 3.4  3.4 3.3*   CHLORIDE 111*  --  111* 108   CO2 30  --  25 20   BUN 14  --  12 17   CR 0.77  --  0.73 0.70   ANIONGAP 3  --  7 12   MANOJ 8.4*  --  8.1* 8.1*   *  --  161* 100*     Most Recent 2 LFT's:  Recent Labs   Lab Test 03/24/21 0522 03/23/21 0521   AST 10 11   ALT 17 16   ALKPHOS 88 69   BILITOTAL 0.6 0.6   ,   Results for orders placed or performed during the hospital encounter of 03/21/21   CT Abdomen Pelvis w Contrast    Narrative    CT ABDOMEN AND PELVIS WITH CONTRAST 3/21/2021 1:32 PM    CLINICAL HISTORY: Abdominal distension; Bowel obstruction suspected.    TECHNIQUE: CT scan of the abdomen and pelvis was performed following  injection of IV contrast. Multiplanar reformats were obtained. Dose  reduction techniques were used.    CONTRAST: 72mL Isovue-370    COMPARISON: March  18, 2021    FINDINGS:   LOWER CHEST: No infiltrates or effusions.    HEPATOBILIARY: No significant mass or bile duct dilatation. Sludge vs.  stones in the gallbladder. No cholecystitis.     PANCREAS: No significant mass, duct dilatation, or inflammatory  change.    SPLEEN: Complex and heterogeneous cystic lesion in the spleen again  noted with additional smaller cystic lesions, stable.    ADRENAL GLANDS: No significant nodules.    KIDNEYS/BLADDER: No significant mass, stones, or hydronephrosis.    BOWEL: No obstruction or inflammatory change.    PELVIC ORGANS: No pelvic masses.    ADDITIONAL FINDINGS: No ascites.    MUSCULOSKELETAL: Survey of the visualized bony structures demonstrates  no destructive bony lesions.      Impression    IMPRESSION:   No acute process demonstrated in the abdomen and pelvis.    ANTONIA NUÑEZ MD   Abdomen US, limited (RUQ only)    Narrative    US ABDOMEN LIMITED 3/21/2021 3:21 PM    CLINICAL HISTORY: Abdominal pain, vomiting, gallbladder with sludge  and stone on CTs.    TECHNIQUE: Limited abdominal ultrasound.    COMPARISON: None.    FINDINGS:    GALLBLADDER: Cholelithiasis without cholecystitis.    BILE DUCTS: There is no biliary dilatation. The common duct measures 3  mm.    LIVER: Unremarkable where seen.    RIGHT KIDNEY: No hydronephrosis.    PANCREAS: The visualized portions of the pancreas are normal.    No ascites.      Impression    IMPRESSION:  Cholelithiasis without cholecystitis.    ANTONIA NUÑEZ MD   NM HepatOBiliary Scan    Narrative    NM HEPATOBILIARY SCAN 3/22/2021 1:42 PM    HISTORY: Right upper quadrant pain.    PROCEDURE: 4.7 mCi of Tc 99m Mebrofenin is given intravenously for  this study.    FINDINGS: Gallbladder activity is identified at 35 minutes into the  study. Bowel activity is identified at 10 minutes into the study.  The  gallbladder fills with radiotracer and quickly empties out before a  gallbladder ejection fraction portion of the study can be  performed.  This occurred twice. Therefore, a formal gallbladder ejection fraction  cannot be calculated but it is suspected to be within normal limits.      Impression    IMPRESSION:  1. Normal visualization of gallbladder.  2. Gallbladder ejection fraction is within normal limits, as described  above.    RASHEL CHRISTIANSON MD       Discharge Medications   Current Discharge Medication List      START taking these medications    Details   sucralfate (CARAFATE) 1 GM/10ML suspension Take 10 mLs (1 g) by mouth 4 times daily (before meals and nightly)  Qty: 1200 mL, Refills: 0    Associated Diagnoses: Acute gastritis without hemorrhage, unspecified gastritis type         CONTINUE these medications which have CHANGED    Details   pantoprazole (PROTONIX) 40 MG EC tablet Take 1 tablet (40 mg) by mouth 2 times daily (before meals)  Qty: 60 tablet, Refills: 0    Associated Diagnoses: Acute gastritis without hemorrhage, unspecified gastritis type         CONTINUE these medications which have NOT CHANGED    Details   carvedilol (COREG) 6.25 MG tablet Take 1 tablet (6.25 mg) by mouth 2 times daily (with meals) Due for a recheck  Qty: 180 tablet, Refills: 0    Associated Diagnoses: Essential hypertension, benign      Chlorpheniramine Maleate (ALLERGY PO) Take 1 tablet by mouth as needed       Cyanocobalamin (VITAMIN B-12 PO) Take 1 tablet by mouth daily       diphenhydrAMINE-acetaminophen (TYLENOL PM)  MG tablet Take 2 tablets by mouth nightly as needed for sleep       docusate sodium (COLACE) 100 MG capsule Take 100 mg by mouth 2 times daily as needed for constipation      liraglutide (VICTOZA) 18 MG/3ML solution Inject 0.6 mg Subcutaneous daily  Qty: 6 mL, Refills: 0    Associated Diagnoses: Type 2 diabetes mellitus with hyperglycemia, without long-term current use of insulin (H)      metFORMIN (GLUCOPHAGE) 1000 MG tablet Take 1 tablet (1,000 mg) by mouth 2 times daily (with meals)  Qty: 180 tablet, Refills: 3    Associated  Diagnoses: Type 2 diabetes mellitus with hyperglycemia, without long-term current use of insulin (H)      ondansetron (ZOFRAN-ODT) 4 MG ODT tab Take 1 tablet (4 mg) by mouth every 8 hours as needed for nausea  Qty: 10 tablet, Refills: 0    Associated Diagnoses: Nausea      plecanatide (TRULANCE) 3 MG tablet Take 1 tablet (3 mg) by mouth daily  Qty: 90 tablet, Refills: 1    Associated Diagnoses: Irritable bowel syndrome with constipation      polyethylene glycol (MIRALAX) 17 GM/Dose powder Take 17 g (1 capful) by mouth daily  Qty: 765 g, Refills: 0    Associated Diagnoses: Irritable bowel syndrome with both constipation and diarrhea      rosuvastatin (CRESTOR) 20 MG tablet Take 1 tablet (20 mg) by mouth daily  Qty: 90 tablet, Refills: 0    Associated Diagnoses: Hyperlipidemia LDL goal <100      senna (SENOKOT) 8.6 MG tablet Take 1 tablet by mouth as needed for constipation      telmisartan (MICARDIS) 40 MG tablet Take 1 tablet (40 mg) by mouth daily  Qty: 90 tablet, Refills: 0    Associated Diagnoses: Hypertension goal BP (blood pressure) < 140/90      topiramate (TOPAMAX) 100 MG tablet Take 1 tablet (100 mg) by mouth daily  Qty: 90 tablet, Refills: 0    Associated Diagnoses: Type 2 diabetes mellitus with mild nonproliferative retinopathy without macular edema, without long-term current use of insulin, unspecified laterality (H)      alcohol swab prep pads Use to swab area of injection/avi as directed.  Qty: 100 each, Refills: 3    Associated Diagnoses: Type 2 diabetes mellitus with mild nonproliferative retinopathy without macular edema, without long-term current use of insulin, unspecified laterality (H)      blood glucose (NO BRAND SPECIFIED) test strip Use to test blood sugar 4 times daily or as directed. To accompany: Blood Glucose Monitor Brands: per insurance.  Qty: 100 strip, Refills: 6    Associated Diagnoses: Type 2 diabetes mellitus with mild nonproliferative retinopathy without macular edema, without  long-term current use of insulin, unspecified laterality (H)      blood glucose calibration (NO BRAND SPECIFIED) solution To accompany: Blood Glucose Monitor Brands: per insurance.  Qty: 1 Bottle, Refills: 3    Associated Diagnoses: Type 2 diabetes mellitus with mild nonproliferative retinopathy without macular edema, without long-term current use of insulin, unspecified laterality (H)      blood glucose monitoring (NO BRAND SPECIFIED) meter device kit Use to test blood sugar 4 times daily or as directed. Preferred blood glucose meter OR supplies to accompany: Blood Glucose Monitor Brands: per insurance.  Qty: 1 kit, Refills: 0    Associated Diagnoses: Type 2 diabetes mellitus with mild nonproliferative retinopathy without macular edema, without long-term current use of insulin, unspecified laterality (H)      insulin pen needle (31G X 5 MM) 31G X 5 MM miscellaneous Use with Victoza daily as directed.  Qty: 100 each, Refills: 1    Associated Diagnoses: Type 2 diabetes mellitus with hyperglycemia, without long-term current use of insulin (H)      thin (NO BRAND SPECIFIED) lancets Use with lanceting device. To accompany: Blood Glucose Monitor Brands: per insurance.  Qty: 100 each, Refills: 6    Associated Diagnoses: Type 2 diabetes mellitus with mild nonproliferative retinopathy without macular edema, without long-term current use of insulin, unspecified laterality (H)           Allergies   Allergies   Allergen Reactions     Ace Inhibitors Itching     Codeine Hives     Lisinopril Nausea and Cough

## 2021-03-25 NOTE — PLAN OF CARE
Pt alert/oriented. Denies pain, nausea, dizziness. Up independently in room. Voiding without problem, urine clear. Had small soft BM last bladimir with small amt bright red blood in toilet. Had 1 tiny hard pebble of stool tonight with 1 drop of red blood.

## 2021-03-25 NOTE — PROGRESS NOTES
Clinic Care Coordination Contact  [facility]/Voicemail       Clinical Data: Care Coordinator Outreach  Outreach attempted x 1.  Left message on patient's voicemail with call back information and requested return call.  Plan:  Care Coordinator will try to reach patient again in 1-2 business days.

## 2021-03-25 NOTE — PROGRESS NOTES
Discharge orders already in, per report from previous RN goals have been met.  Patient will discharge home when ride can come- she is calling her ride right now to see.

## 2021-03-25 NOTE — PROGRESS NOTES
"Care Management Discharge Note    Discharge Date: 03/25/21     Discharge Disposition: Home    Discharge Services: None    Discharge DME: None    Discharge Transportation: family or friend will provide    Private pay costs discussed: Not applicable    Education Provided on the Discharge Plan:  yes  Persons Notified of Discharge Plans: Pt  Patient/Family in Agreement with the Plan:  Yes    Handoff Referral Completed: Yes    Additional Information:  CM called Pt to assess any needs on discharge. Pt verbalized feeling very well today, up moving independently in her room and pleased to be discharging home today. Pt stated her father will be providing transport. Reported she is comfortable with her new medications and much has been explained to her by the nursing staff which has put her mind at ease.     Pt stated \"I have felt so privileged to be here and to have such great nurses. Everyone has been so nice and have given me such great care.\"     Plan: Discharge home. Declined Home care on discharge.     ERIC Thrasher      "

## 2021-03-25 NOTE — PROGRESS NOTES
CLAIRE COTAG DISCHARGE NOTE    Patient discharged to home at 10:23 AM via wheel chair. Accompanied by mother and father and staff. Discharge instructions reviewed with patient, opportunity offered to ask questions. Prescriptions sent to patients preferred pharmacy. All belongings sent with patient.    Josefina Lanza RN

## 2021-03-26 NOTE — PROGRESS NOTES
Clinic Care Coordination Contact  [facility] [[email or web address]]/Voicemail       Clinical Data: Care Coordinator Outreach  Outreach attempted x 2.  Left message on patient's voicemail with call back information and requested return call.  Plan: Care Coordinator will send care coordination introduction letter with care coordinator contact information and explanation of care coordination services via [employer]. Care Coordinator will do no further outreaches at this time.

## 2021-03-28 ENCOUNTER — ANESTHESIA EVENT (OUTPATIENT)
Dept: GASTROENTEROLOGY | Facility: CLINIC | Age: 61
End: 2021-03-28

## 2021-03-28 NOTE — ANESTHESIA PREPROCEDURE EVALUATION
Anesthesia Pre-Procedure Evaluation    Patient: Lorrie Blake   MRN: [id number] : 1960        Preoperative Diagnosis: Abdominal pain, epigastric [R10.13]   Procedure : Procedure(s):  ESOPHAGOGASTRODUODENOSCOPY, WITH BIOPSY     No past medical history on file.   Past Surgical History:   Procedure Laterality Date     ESOPHAGOSCOPY, GASTROSCOPY, DUODENOSCOPY (EGD), COMBINED N/A 3/22/2021    Procedure: ESOPHAGOGASTRODUODENOSCOPY, WITH BIOPSY;  Surgeon: Myron Willingham MD;  Location: [facility]      Allergies   Allergen Reactions     Ace Inhibitors Itching     Codeine Hives     Lisinopril Nausea and Cough      Social History     Tobacco Use     Smoking status: Never Smoker     Smokeless tobacco: Never Used   Substance Use Topics     Alcohol use: No     Frequency: Never      Wt Readings from Last 1 Encounters:   21 68 kg (149 lb 14.6 oz)        Anesthesia Evaluation   Pt has had prior anesthetic. Type: General and MAC.    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     ABNORMAL     Cath: Date: Results:      METS/Exercise Tolerance:     Hematologic:  - neg hematologic  ROS     Musculoskeletal:  - neg musculoskeletal ROS     GI/Hepatic: Comment: Nausea and vomiting    (+) GERD, Asymptomatic on medication,     Renal/Genitourinary:  - neg Renal ROS     Endo:     (+) type II DM, Last HgA1c: 11.8, date: 2021, Using insulin,     Psychiatric/Substance Use:  - neg psychiatric ROS     Infectious Disease:  - neg infectious disease ROS     Malignancy:  - neg malignancy ROS     Other:            Physical Exam    Airway        Mallampati: II   TM distance: > 3 FB   Neck ROM: full   Mouth opening: > 3  cm    Respiratory Devices and Support         Dental  no notable dental history         Cardiovascular   cardiovascular exam normal          Pulmonary   pulmonary exam normal                OUTSIDE LABS:  CBC:   Lab Results   Component Value Date    WBC 2.9 (L) 03/24/2021    WBC 3.2 (L) 03/23/2021    HGB 12.6 03/24/2021    HGB 11.7 03/23/2021    HCT 38.5 03/24/2021    HCT 35.6 03/23/2021     (L) 03/24/2021     (L) 03/23/2021     BMP:   Lab Results   Component Value Date     03/24/2021     03/23/2021    POTASSIUM 4.3 03/24/2021    POTASSIUM 4.8 03/23/2021    CHLORIDE 111 (H) 03/24/2021    CHLORIDE 111 (H) 03/23/2021    CO2 30 03/24/2021    CO2 25 03/23/2021    BUN 14 03/24/2021    BUN 12 03/23/2021    CR 0.77 03/24/2021    CR 0.73 03/23/2021     (H) 03/24/2021     (H) 03/23/2021     COAGS: No results found for: PTT, INR, FIBR  POC:   Lab Results   Component Value Date     (H) 03/25/2021     HEPATIC:   Lab Results   Component Value Date    ALBUMIN 2.9 (L) 03/24/2021    PROTTOTAL 5.8 (L) 03/24/2021    ALT 17 03/24/2021    AST 10 03/24/2021    ALKPHOS 88 03/24/2021    BILITOTAL 0.6 03/24/2021     OTHER:   Lab Results   Component Value Date    LACT 0.8 03/22/2021    A1C 10.6 (H) 03/21/2021    MANOJ 8.4 (L) 03/24/2021    PHOS 2.1 (L) 03/25/2021    MAG 2.0 03/24/2021    LIPASE 293 03/21/2021    TSH 1.07 01/22/2018    T4 1.01 01/22/2018       Anesthesia Plan    ASA Status:  2   NPO Status:  NPO Appropriate    Anesthesia Type: MAC.     - Reason for MAC: immobility needed              Consents    Anesthesia Plan(s) and associated risks, benefits, and realistic alternatives discussed. Questions answered and patient/representative(s) expressed understanding.     - Discussed with:  Patient      - Extended Intubation/Ventilatory Support Discussed: No.      - Patient is DNR/DNI Status: No    Use of blood products discussed: No .     Postoperative Care            Comments:                     Charisse Lugo, NOLVIA CRNA

## 2021-03-29 ENCOUNTER — OFFICE VISIT (OUTPATIENT)
Dept: FAMILY MEDICINE | Facility: CLINIC | Age: 61
End: 2021-03-29
Payer: COMMERCIAL

## 2021-03-29 VITALS
TEMPERATURE: 98.2 F | HEART RATE: 96 BPM | SYSTOLIC BLOOD PRESSURE: 90 MMHG | RESPIRATION RATE: 16 BRPM | BODY MASS INDEX: 27.15 KG/M2 | WEIGHT: 139 LBS | DIASTOLIC BLOOD PRESSURE: 62 MMHG

## 2021-03-29 DIAGNOSIS — K59.09 CHRONIC CONSTIPATION: ICD-10-CM

## 2021-03-29 DIAGNOSIS — K29.00 ACUTE GASTRITIS WITHOUT HEMORRHAGE, UNSPECIFIED GASTRITIS TYPE: ICD-10-CM

## 2021-03-29 DIAGNOSIS — R10.84 ABDOMINAL PAIN, GENERALIZED: ICD-10-CM

## 2021-03-29 DIAGNOSIS — E83.51 HYPOCALCEMIA: Primary | ICD-10-CM

## 2021-03-29 LAB
ALBUMIN SERPL-MCNC: 3.7 G/DL (ref 3.4–5)
ALP SERPL-CCNC: 68 U/L (ref 40–150)
ALT SERPL W P-5'-P-CCNC: 30 U/L (ref 0–50)
ANION GAP SERPL CALCULATED.3IONS-SCNC: 6 MMOL/L (ref 3–14)
AST SERPL W P-5'-P-CCNC: 21 U/L (ref 0–45)
BASOPHILS # BLD AUTO: 0 10E9/L (ref 0–0.2)
BASOPHILS NFR BLD AUTO: 0.4 %
BILIRUB SERPL-MCNC: 0.6 MG/DL (ref 0.2–1.3)
BUN SERPL-MCNC: 13 MG/DL (ref 7–30)
CALCIUM SERPL-MCNC: 9.3 MG/DL (ref 8.5–10.1)
CHLORIDE SERPL-SCNC: 104 MMOL/L (ref 94–109)
CO2 SERPL-SCNC: 26 MMOL/L (ref 20–32)
CREAT SERPL-MCNC: 0.97 MG/DL (ref 0.52–1.04)
DIFFERENTIAL METHOD BLD: NORMAL
EOSINOPHIL # BLD AUTO: 0.1 10E9/L (ref 0–0.7)
EOSINOPHIL NFR BLD AUTO: 1.7 %
ERYTHROCYTE [DISTWIDTH] IN BLOOD BY AUTOMATED COUNT: 13.1 % (ref 10–15)
GFR SERPL CREATININE-BSD FRML MDRD: 63 ML/MIN/{1.73_M2}
GLUCOSE SERPL-MCNC: 291 MG/DL (ref 70–99)
HCT VFR BLD AUTO: 42.7 % (ref 35–47)
HGB BLD-MCNC: 13.7 G/DL (ref 11.7–15.7)
LYMPHOCYTES # BLD AUTO: 1.3 10E9/L (ref 0.8–5.3)
LYMPHOCYTES NFR BLD AUTO: 24.7 %
MAGNESIUM SERPL-MCNC: 1.7 MG/DL (ref 1.6–2.3)
MCH RBC QN AUTO: 28.4 PG (ref 26.5–33)
MCHC RBC AUTO-ENTMCNC: 32.1 G/DL (ref 31.5–36.5)
MCV RBC AUTO: 88 FL (ref 78–100)
MONOCYTES # BLD AUTO: 0.4 10E9/L (ref 0–1.3)
MONOCYTES NFR BLD AUTO: 7.3 %
NEUTROPHILS # BLD AUTO: 3.4 10E9/L (ref 1.6–8.3)
NEUTROPHILS NFR BLD AUTO: 65.9 %
PHOSPHATE SERPL-MCNC: 2.4 MG/DL (ref 2.5–4.5)
PLATELET # BLD AUTO: 210 10E9/L (ref 150–450)
POTASSIUM SERPL-SCNC: 4.4 MMOL/L (ref 3.4–5.3)
PROT SERPL-MCNC: 6.9 G/DL (ref 6.8–8.8)
RBC # BLD AUTO: 4.83 10E12/L (ref 3.8–5.2)
SODIUM SERPL-SCNC: 136 MMOL/L (ref 133–144)
WBC # BLD AUTO: 5.2 10E9/L (ref 4–11)

## 2021-03-29 PROCEDURE — 83735 ASSAY OF MAGNESIUM: CPT | Performed by: NURSE PRACTITIONER

## 2021-03-29 PROCEDURE — 36415 COLL VENOUS BLD VENIPUNCTURE: CPT | Performed by: NURSE PRACTITIONER

## 2021-03-29 PROCEDURE — 84100 ASSAY OF PHOSPHORUS: CPT | Performed by: NURSE PRACTITIONER

## 2021-03-29 PROCEDURE — 99495 TRANSJ CARE MGMT MOD F2F 14D: CPT | Performed by: NURSE PRACTITIONER

## 2021-03-29 PROCEDURE — 82306 VITAMIN D 25 HYDROXY: CPT | Performed by: NURSE PRACTITIONER

## 2021-03-29 PROCEDURE — 85025 COMPLETE CBC W/AUTO DIFF WBC: CPT | Performed by: NURSE PRACTITIONER

## 2021-03-29 PROCEDURE — 80053 COMPREHEN METABOLIC PANEL: CPT | Performed by: NURSE PRACTITIONER

## 2021-03-29 RX ORDER — SUCRALFATE ORAL 1 G/10ML
1 SUSPENSION ORAL
Qty: 1200 ML | Refills: 0 | Status: SHIPPED | OUTPATIENT
Start: 2021-03-29

## 2021-03-29 ASSESSMENT — ENCOUNTER SYMPTOMS
NERVOUS/ANXIOUS: 0
NAUSEA: 0
DIARRHEA: 0
SORE THROAT: 0
HEARTBURN: 1
VOMITING: 0
PALPITATIONS: 0
ARTHRALGIAS: 0
COUGH: 0
CONSTIPATION: 1
SLEEP DISTURBANCE: 0
WHEEZING: 0
DIZZINESS: 0
ABDOMINAL PAIN: 1
MYALGIAS: 0
CHEST TIGHTNESS: 0
FATIGUE: 0
NUMBNESS: 0
DYSPHORIC MOOD: 0
SHORTNESS OF BREATH: 0
ABDOMINAL DISTENTION: 0
LIGHT-HEADEDNESS: 0
RHINORRHEA: 0
HEADACHES: 0

## 2021-03-29 ASSESSMENT — PAIN SCALES - GENERAL: PAINLEVEL: EXTREME PAIN (8)

## 2021-03-29 NOTE — PATIENT INSTRUCTIONS
"Patient Education     [place] Diet  Your healthcare provider may recommend a bland diet if you have an upset stomach. It consists of foods that are mild and easy to digest. It is better to eat small frequent meals rather than 3 large meals a day.    Beverages  OK: Fruit juices, non-caffeinated teas and coffee, non-carbonated kumar  Avoid: Carbonated beverage, caffeinated tea and coffee, all alcoholic beverages  Bread  OK: Refined white, wheat or rye bread, christophe or soda crackers, Barbara toast, plain rolls, bagels  Avoid: Whole-grain bread  Cereal  OK: Refined cereals: cooked or ready to eat  Avoid: Whole-grain cereals and granola, or those containing bran, seeds or nuts  Desserts  OK: Peanut butter and all others except those to \"avoid\"  Avoid: Chocolate, cocoa, coconut, popcorn, nuts, seeds, jam, marmalade  Fruits  OK: Canned, cooked, frozen or fresh fruits without seeds or tough skin  Avoid: Olives, skin and seeds of fruit, dried fruit  Meats  OK: All fresh or preserved meat, fish and fowl  Avoid: Any that are prepared with those spices to \"avoid\"  Cheese and eggs  OK: Eggs, cottage cheese, cream cheese, other cheeses  Avoid: All cheeses made with those spices to \"avoid\"  Potatoes and pasta  OK: Potato, rice, macaroni, noodles, spaghetti  Avoid: None  Soups  OK: All soups without heavy seasoning  Avoid: Soups made with those spices to \"avoid\"  Vegetables  OK: Canned, cooked, fresh or frozen mildly flavored vegetables without seeds, skins or coarse fiber  Avoid: Vegetables prepared with those spices to \"avoid\"; skin and seeds of vegetables and those with coarse fiber, broccoli, cabbage, cauliflower, cucumber, green peppers, and corn  Spices  OK: Salt, lemon and limejuice, vinegar, all extracts, josh, cinnamon, thyme, mace, allspice, paprika  Avoid: Chili powder, cloves, pepper, seed spices, garlic, gravy pickles, highly seasoned salad dressings  Matilde last reviewed this educational content on 5/1/2018    " 0706-2454 [employer]. All rights reserved. This information is not intended as a substitute for professional medical care. Always follow your healthcare professional's instructions.

## 2021-03-29 NOTE — PROGRESS NOTES
Assessment & Plan     Acute gastritis without hemorrhage, unspecified gastritis type  Educated on how to make Carafate slurry.  Will attempt to get suspension covered by insurance.  Recommend further follow-up with gastroenterology.  Referral order placed.  Education given regarding bland diet.  Educated regarding warning signs to watch for and when would need to seek immediate medical attention.  Encouraged to avoid alcohol and over-the-counter NSAIDs.  - sucralfate (CARAFATE) 1 GM/10ML suspension; Take 10 mLs (1 g) by mouth 4 times daily (before meals and nightly)  - GASTROENTEROLOGY ADULT REF CONSULT ONLY; Future    Hypocalcemia  We will check additional labs here today  - Phosphorus  - Comprehensive metabolic panel  - CBC with platelets differential  - Magnesium  - Vitamin D Deficiency    Abdominal pain, generalized  Recommend further follow-up with gastroenterology  - GASTROENTEROLOGY ADULT REF CONSULT ONLY; Future    Chronic constipation  - GASTROENTEROLOGY ADULT REF CONSULT ONLY; Future    We will plan follow-up appointment in 1 month.  Follow-up appointment in 1 month for diabetic check.  Plan physical in 1 month.    Review of external notes as documented elsewhere in note  Review of the result(s) of each unique test - Phosphorus, vitamin D, parathyroid, CBC, hepatic panel  30 minutes spent on the date of the encounter doing chart review, history and exam, documentation and further activities as noted above       No follow-ups on file.    NOLVIA Lazaro [facility] SHANT Andrew is a 60 year old who presents for the following health issues     HPI       Hospital Follow-up Visit:    Hospital/Nursing Home/IP Rehab Facility: [facility]  Date of Admission: 3/21/21  Date of Discharge: 3/25/21  Reason(s) for Admission: abdominal pain, epigastric      Was your hospitalization related to COVID-19? No   Problems taking medications regularly:  Difficulty  swallowing medications. Unable to get Carafate as a liquid from her pharmacy.   Medication changes since discharge: None  Problems adhering to non-medication therapy: Ate fried foods day after coming home from hospital. Stomach was on fire and was vomiting. Knows this was not a good decision. Next day was able to eat mashed potatoes. Following day ate baked chicken and baked yam. Stomach is starting to feel better today but is still severe. Throat is sore. Has to sleep sitting up.     Summary of hospitalization:  [place] hospital discharge summary reviewed  Diagnostic Tests/Treatments reviewed.  Follow up needed: GI  Other Healthcare Providers Involved in Patient s Care:         Specialist appointment - GI  Update since discharge: stable. Post Discharge Medication Reconciliation: discharge medications reconciled, continue medications without change.  Plan of care communicated with patient         Here today for hospital follow-up.  Was recently inpatient due to severe abdominal pain.  Had upper endoscopy.  Found to have gastritis.  Was discharged home on Protonix twice daily and Carafate suspension.  Insurance would not cover suspension.  Has been taking tablets but is having a hard time getting them down.  Continues to struggle with nausea and vomiting.  Has been using Zofran but it has been ineffective.  When returned home from hospital ate a high fat, greasy meal.  Reports abdominal pain was immediately back after that meal.  Has been trying to get things back under control. Is not as acidic as was. Is worried that if eats something wrong will cause the pain.  Has been trying to follow a bland diet but admits these are not foods that enjoys eating.  Bowels are not moving very well, struggles with chronic constipation. Bowels last moved on Sunday.  Did not visualize stool.  Uncertain if was dark in color.  Is feeling more tired and fatigued. Is waking up with reflux. Reports if has anything in stomach will vomit.  Does get chills. Feels very tired and weak. Will have to lay down.  Is not able to be as active as had been.  Lives with parents.  Helps to care for them.  Blood sugars, has not been checking very often.  Did check it this morning and got 265. Has been taking tylenol as needed for the blaise.    Review of Systems   Constitutional: Negative for fatigue.   HENT: Negative for ear pain, rhinorrhea and sore throat.    Eyes: Negative for visual disturbance.   Respiratory: Negative for cough, chest tightness, shortness of breath and wheezing.    Cardiovascular: Negative for chest pain and palpitations.   Gastrointestinal: Positive for abdominal pain, constipation and heartburn. Negative for abdominal distention, diarrhea, nausea and vomiting.   Endocrine: Negative for cold intolerance and heat intolerance.   Musculoskeletal: Negative for arthralgias and myalgias.   Skin: Negative for rash.   Neurological: Negative for dizziness, light-headedness, numbness and headaches.   Psychiatric/Behavioral: Negative for dysphoric mood and sleep disturbance. The patient is not nervous/anxious.           Objective    BP 90/62 (BP Location: Right arm, Patient Position: Sitting, Cuff Size: Adult Regular)   Pulse 96   Temp 98.2  F (36.8  C) (Tympanic)   Resp 16   Wt 63 kg (139 lb)   BMI 27.15 kg/m    Body mass index is 27.15 kg/m .  Physical Exam  Constitutional:       Appearance: Normal appearance. She is well-developed.   HENT:      Head: Normocephalic and atraumatic.      Right Ear: Tympanic membrane and external ear normal. No middle ear effusion.      Left Ear: Tympanic membrane and external ear normal.  No middle ear effusion.      Nose: No mucosal edema.   Neck:      Thyroid: No thyromegaly.      Vascular: No carotid bruit.   Cardiovascular:      Rate and Rhythm: Normal rate and regular rhythm.      Heart sounds: Normal heart sounds.   Pulmonary:      Effort: Pulmonary effort is normal.      Breath sounds: Normal breath sounds.    Abdominal:      General: Bowel sounds are normal.      Palpations: Abdomen is soft.      Tenderness: There is generalized abdominal tenderness. There is guarding (in epigastric area).   Skin:     General: Skin is warm and dry.   Neurological:      Mental Status: She is alert.   Psychiatric:         Behavior: Behavior normal.

## 2021-03-30 ENCOUNTER — TELEPHONE (OUTPATIENT)
Dept: FAMILY MEDICINE | Facility: CLINIC | Age: 61
End: 2021-03-30

## 2021-03-30 LAB — DEPRECATED CALCIDIOL+CALCIFEROL SERPL-MC: 22 UG/L (ref 20–75)

## 2021-03-30 NOTE — TELEPHONE ENCOUNTER
I called and spoke to patient, she will call to get on gastro schedule.    She says her insurance would not cover carafate liquid, she says they gave her tablets and she chokes on them.    She will eat a bland diet.    I called pharmacy to clarify the issue.   They have the Rx for liquid sucralfate but insurance won't cover.   Can try a PA or switch to tablets.       Patient reports she chokes on the tablets (it is unclear when she was on tablets previously but maybe per another provider).    Routed to Mary Kay Pollard, alternate Rx or team to pursue PA for sucralfate suspension?    Gabbie Acosta, RN  [facility]

## 2021-03-30 NOTE — TELEPHONE ENCOUNTER
Dr. Sandy is a general surgeon that performs endoscopies as well.  He is not a gastroenterologist.  I would recommend starting the Carafate suspension as ordered and sticking to a bland diet.  If there is no improvement in the abdominal pain over the next week can see about getting her in more urgently.  At this point, they would not be able to do anything additionally outpatient.  Due to her longstanding history of gastrointestinal issues that is the reason I thought she should establish with a gastroenterologist.    Mary Kay LANTIGUAC

## 2021-03-30 NOTE — TELEPHONE ENCOUNTER
"Patient was seen yesterday by Mary Kay Pollard.   I see she placed gastro consult yesterday.    I see  reached out to patient, note says \"patient is going to get in touch with referring provider\".    I called and spoke to patient, she says the soonest GI could see her was the end of May.   She is sure she needs to be seen before then.  She was told her provider would need to call.    She says Mary Kay is to call [phone] to do a provider to provider call.   She says Dr. Myron Willingham is who she has seen previously.    Routed to Mary Kay Pollard.    Gabbie Acosta RN  [facility]      "

## 2021-03-31 ENCOUNTER — TELEPHONE (OUTPATIENT)
Dept: FAMILY MEDICINE | Facility: CLINIC | Age: 61
End: 2021-03-31

## 2021-03-31 NOTE — TELEPHONE ENCOUNTER
Received a VM from Jorge at insurance with additional questions, called insurance back, answered questions, PA still under review.

## 2021-03-31 NOTE — TELEPHONE ENCOUNTER
Prior Authorization Retail Medication Request    Medication/Dose:   ICD code (if different than what is on RX):  [K29.00]   Previously Tried and Failed:    Rationale:  Plan does not cover     Insurance Name:  Blue Plus/Blue Plus Advant   Insurance ID:  [id number]      Pharmacy Information (if different than what is on RX)  Name:  [facility]   Phone:  [phone]

## 2021-03-31 NOTE — LETTER
April 1, 2021    Medical Management  Insurance name  Re:   Policy Number     Dear Appeal Department,    We would like to request an appeal for sucralfate (CARAFATE) 1 GM/10ML suspension. The patient has severe acute gastritis without hemorrhage, unspecified gastritis type [K29.00].  Therefore, I believe it is medically appropriate to prescribe sucralfate (CARAFATE) 1 GM/10ML suspension as she has the inability to swallow tablets and she also gags when a slurry is prepared.     Thank you for your reconsideration.  We will be waiting for your response.    NOLVIA Lazaro CNP

## 2021-03-31 NOTE — TELEPHONE ENCOUNTER
Central Prior Authorization Team   Phone: [phone]      PA Initiation    Medication: sucralfate (CARAFATE) 1 GM/10ML suspension-Initiated  Insurance Company: Blue Plus P[employer] - Phone [phone] Fax [phone]  Pharmacy Filling the Rx: [facility] [address] [place] [address]  Filling Pharmacy Phone: [phone]  Filling Pharmacy Fax:    Start Date: 3/31/2021         Methotrexate Counseling:  Patient counseled regarding adverse effects of methotrexate including but not limited to nausea, vomiting, abnormalities in liver function tests. Patients may develop mouth sores, rash, diarrhea, and abnormalities in blood counts. The patient understands that monitoring is required including LFT's and blood counts.  There is a rare possibility of scarring of the liver and lung problems that can occur when taking methotrexate. Persistent nausea, loss of appetite, pale stools, dark urine, cough, and shortness of breath should be reported immediately. Patient advised to discontinue methotrexate treatment at least three months before attempting to become pregnant.  I discussed the need for folate supplements while taking methotrexate.  These supplements can decrease side effects during methotrexate treatment. The patient verbalized understanding of the proper use and possible adverse effects of methotrexate.  All of the patient's questions and concerns were addressed.

## 2021-04-01 ENCOUNTER — TELEPHONE (OUTPATIENT)
Dept: FAMILY MEDICINE | Facility: CLINIC | Age: 61
End: 2021-04-01

## 2021-04-01 ENCOUNTER — ANESTHESIA (OUTPATIENT)
Dept: GASTROENTEROLOGY | Facility: CLINIC | Age: 61
End: 2021-04-01

## 2021-04-01 NOTE — TELEPHONE ENCOUNTER
There has already been appeal that has been denied.  Second level appeal are handled by the clinic.

## 2021-04-01 NOTE — TELEPHONE ENCOUNTER
Patient calling in regarding the sucralfate suspension she got a call from her insurance and they said it was denied. She said she cannot take the pills because she gags and throws them up. She said they told her this was not mentioned.    Nurse relayed the denial reason from the PA that states she has to take the tablets and mix them with water and patient said she has already tried this and she gags throws it up so it is not effective.      Nurse see PA encounter was routed back to the  Prior Auth [place] this morning.     Thank you,   Kelly Peña RN

## 2021-04-01 NOTE — TELEPHONE ENCOUNTER
"FYI to provider: patient's Mom called demanding to speak to A Latrice. Her daughter has abdominal pain and we are \"dropping the ball \" on this regarding her medication. Med Sec spoke to patient and told her we will contact her as soon as a decision has been made by her insurance company. Patient was offered the chance to speak to an RN in regards to methods for swallowing pills, she declined stating that she already did that. A slurry is still \"too grainy\"      "

## 2021-04-01 NOTE — TELEPHONE ENCOUNTER
MEDICATION APPEAL DENIED    Medication: sucralfate (CARAFATE) 1 GM/10ML suspension-DENIED    Denial Date:  04/01/21    Denial Rational: Patient must have a history of trial & failure to the formulary alternative(s) or have a contraindication or intolerance to the formulary alternatives:        Second Level Appeal Information:     Second level appeals will be managed by the clinic staff and provider. Please contact the [employer] Prior Authorization Team if additional information about the denial is needed.

## 2021-04-05 ENCOUNTER — TELEPHONE (OUTPATIENT)
Dept: FAMILY MEDICINE | Facility: CLINIC | Age: 61
End: 2021-04-05

## 2021-04-05 NOTE — TELEPHONE ENCOUNTER
Would really like to speak to Mary Kay Pollard today regarding a medication she was supposed to prescribe for patient but never did. Patient need this taken care of today.    Please call patient back at [phone]

## 2021-04-05 NOTE — TELEPHONE ENCOUNTER
See Call on 4/1/21.       Lorrie  called very upset saying that despite the PA being submitted several times for Sucralfate (CARAFATE) 1 GM/10ML suspension, it has been denied again.     She said the Pharmacist  recommended that she crush the pills, however this does not work for her. She would like to speak with Mary Kay Pollard about other options.     I informed patient that we are unable to see how the PA reads and that a special team works on this with the provider and pharmacy.     I informed patient that Mary Kay is out of the clinic today, however we will send her message to her for review.     Please review and advise.     Evie Austin RN BSN  [facility]

## 2021-04-05 NOTE — LETTER
2021    INSURER: Payor: BLUE [employer]US / Plan: [employer] [place]    ATTN: Appeal Department  Re: Prior Authorization Request  Patient: Lorrie Blake  Policy ID#:  [id number]  : 1960      To Whom it May Concern:    I am writing to formally request a prior authorization of coverage for my patient,  Lorrie Blake, for treatment using sucralfate (CARAFATE) 1 GM/10ML suspension .    The therapy involves: Sig - Route: Take 10 mLs (1 g) by mouth 4 times daily (before meals and nightly)      The benefits of the therapy include relief from Acute gastritis without hemorrhage, unspecified gastritis type [K29.00]       I have treated Lorrie Blake since 2019 and I have determined that it is medically appropriate for  this patient to receive be treated with sucralfate (CARAFATE) 1 GM/10ML suspension for the reason(s) stated below:        Patient has an inability to swallow tablets and capsules. She has tried to crush the pills and mix into a slurry that she is also unable to tolerate.         Without this medication, patient suffers from abdominal pain           Notes per providers last office visit on 21 include:     Educated on how to make Carafate slurry.  Will attempt to get suspension covered by insurance.  Recommend further follow-up with gastroenterology.  Referral order placed.  Education given regarding bland diet.  Educated regarding warning signs to watch for and when would need to seek immediate medical attention.  Encouraged to avoid alcohol and over-the-counter NSAIDs.  - sucralfate (CARAFATE) 1 GM/10ML suspension; Take 10 mLs (1 g) by mouth 4 times daily (before meals and nightly)  - GASTROENTEROLOGY ADULT REF CONSULT ONLY; Future        I firmly believe that this therapy is clinically appropriate and that Lorrie Blake would benefit from improved quality of life if allowed the opportunity to receive this treatment.  Please contact me at Dept: [phone] if you require  additional information to ensure the prompt approval for coverage.        Sincerely,      Mary Kay Pollard MD/kristal

## 2021-04-06 NOTE — TELEPHONE ENCOUNTER
Per the PA in her chart insurance is requiring her to make a slurry as the pharmacist recommended.  It did go to a second level prior authorization.  Can you please check on the status of that?    Mary Kay PARRA

## 2021-04-06 NOTE — TELEPHONE ENCOUNTER
"I see \"second level appeals are managed by clinic staff and provider\" per 3/31/21 phone note.    I see [facility] is who the letter was faxed to, phone contact [phone].        Flagged for med sec'y pool to follow up.  Did we get a response or can someone call to check on this?    Gabbie Acosta RN  [facility]          "

## 2021-04-07 NOTE — TELEPHONE ENCOUNTER
The current letter that was sent will not work, there is no patient name. A new letter has been faxed to the appeal department at [phone].   (please note on the cover sheet of the new letter a fax # was provided for response to [employer])

## 2021-04-12 DIAGNOSIS — E11.3299 TYPE 2 DIABETES MELLITUS WITH MILD NONPROLIFERATIVE RETINOPATHY WITHOUT MACULAR EDEMA, WITHOUT LONG-TERM CURRENT USE OF INSULIN, UNSPECIFIED LATERALITY (H): ICD-10-CM

## 2021-04-14 ENCOUNTER — COMMUNICATION - HEALTHEAST (OUTPATIENT)
Dept: SCHEDULING | Facility: CLINIC | Age: 61
End: 2021-04-14

## 2021-04-14 RX ORDER — TOPIRAMATE 100 MG/1
100 TABLET, FILM COATED ORAL AT BEDTIME
Qty: 90 TABLET | Refills: 0 | Status: SHIPPED | OUTPATIENT
Start: 2021-04-14

## 2021-04-19 NOTE — TELEPHONE ENCOUNTER
A letter has now been received from [facility] Appeals Dept that if an appeal is to be considered that a written statement must be received from the patient herself.   Please advise, try and get patient to do this? Letter to CALE Machado in basket.

## 2021-04-20 NOTE — TELEPHONE ENCOUNTER
Please notify patient that she will need to right of letter herself stating why she needs the medication and submitted to her insurance company.    Mary Kay LANTIGUAC

## 2021-04-23 NOTE — TELEPHONE ENCOUNTER
Spoke to patient, she will prepare a letter and drop it off at the [facility].

## 2021-05-15 ENCOUNTER — HEALTH MAINTENANCE LETTER (OUTPATIENT)
Age: 61
End: 2021-05-15

## 2021-08-03 NOTE — TELEPHONE ENCOUNTER
Reason for call:  Other    Patient says that GI says that they need a Doctor to Doctor report with you in order to see patient before May, per patient. Please give the patient a call.(reason for call):   Additional comments:N/A    Phone number to reach patient:  Cell number on file:    Telephone Information:   Mobile [phone]       Best Time:  ASAP    Can we leave a detailed message on this number?  YES    Travel screening: Not Applicable   Detail Level: Detailed Detail Level: Zone

## 2021-09-04 ENCOUNTER — HEALTH MAINTENANCE LETTER (OUTPATIENT)
Age: 61
End: 2021-09-04

## 2021-10-30 ENCOUNTER — HEALTH MAINTENANCE LETTER (OUTPATIENT)
Age: 61
End: 2021-10-30

## 2021-11-04 ENCOUNTER — THERAPY VISIT (OUTPATIENT)
Dept: PHYSICAL THERAPY | Facility: CLINIC | Age: 61
End: 2021-11-04
Payer: COMMERCIAL

## 2021-11-04 DIAGNOSIS — M75.02 ADHESIVE CAPSULITIS OF LEFT SHOULDER: ICD-10-CM

## 2021-11-04 PROCEDURE — 97162 PT EVAL MOD COMPLEX 30 MIN: CPT | Mod: GP | Performed by: PHYSICAL THERAPIST

## 2021-11-04 PROCEDURE — 97110 THERAPEUTIC EXERCISES: CPT | Mod: GP | Performed by: PHYSICAL THERAPIST

## 2021-11-04 NOTE — PROGRESS NOTES
HPI                  PHYSICAL THERAPY INITIAL EVALUATION    Precautions/Restrictions/MD instructions:  Evaluate & Treat - Left Shoulder Adhesive Capsulitis    Therapist Assessment: Lorrie Blake is a 60 year old right hand dominant female who presents to Physical Therapy with left shoulder pain.  Patient demonstrates mobility loss and weakness and is apprehensive to all PROM.  Signs and symptoms are consistent with adhesive capsulitis.   These impairments limit her ability to use RUE for bathing, dressing, reaching, and lifting.  Skilled PT services are necessary in order to reduce impairments and improve independent function.     SUBJECTIVE:  Chief Complaint: left shoulder pain  Onset Date:  1 year ago   MD referral date:  10/22/21    DOS: NA  Mechanism of Injury: was using a transfer belt to help her handicapped mother off the floor after a fall  New/Recurrent/Chronic:  Chronic  Pain Location: anterior and superior left shoulder;  described as constant dull ache (sharp with movement) and rated as 5/10  Associated Symptoms: motion loss, weakness  Exacerbated by:  Reaching below shoulder level, reaching overhead or behind back, lifting               Alleviated by: anti-inflammatory  Time of day: no effect  Progression of symptoms since onset:  Gradually worsening  Previous Treatment:  CSI (sx worse since injection yesterday)    General Health as reported by patient: Fair  Pertinent Medical History: Diabetes, high blood pressure, overweight  Surgical History:  x 2, Bilat TKA  Imaging:  none  Medications:  High blood pressure, pain medication, NSAID, sleep medication, Diabetes med, ulcer medication     Occupation:  None; recently ; lives with parents and serves as primary caretaker; Mom is handicapped    Primary Job Tasks: NA    Restrictions:  None  Barriers to Treatment: none  Red Flags:  None  Current Functional Status: takes care of parents; mom handicapped;   Previous Functional Status:   fully functional  Leisure Activities: none    Patient's Goal(s):  To increase mobility and decrease pain in left shoulder              Objective:  System  SHOULDER EVALUATION    POSTURE: increased thoracic kyphosis, rounded shoulders     CERVICAL SCREEN: NA  SPURLING'S: NA    RANGE OF MOTION: (* denotes pain)   AROM L AROM R PROM L PROM R   FLEXION 110* 135 142*    ABDUCTION 110* 142 95*    IR/EXT L glute * T10     IR   52    ER 30*  40*    HORIZ ADD         NOTE: empty end feel with all PROM    STRENGTH:  (* denotes pain)   LEFT RIGHT   FLEXION          4/5     ABDUCTION 4/5    IR 4+/5    ER 4+/5    SUPRASPINATUS         Special Tests: positive Neer Impingement sign, postive Hawkin's-Jack    Scapulohumeral Rhythm:  Abnormal with scapular dyskinesis       Mobility Testing: patient too apprehensive to testing           Physical Exam    General     ROS    Assessment/Plan:    Patient is a 60 year old female with left shoulder complaints.    Patient has the following significant findings with corresponding treatment plan.                Diagnosis 1:  Left Shoulder Adhesive Capsuliltis    Pain -  self management and education  Decreased ROM/flexibility - manual therapy and therapeutic exercise  Decreased strength - therapeutic exercise  Decreased function - home program  Impaired posture - neuro re-education    Therapy Evaluation Codes:   1) History comprised of:   Personal factors that impact the plan of care:      Coping style, Overall behavior pattern and Time since onset of symptoms.    Comorbidity factors that impact the plan of care are:      Diabetes and High blood pressure.     Medications impacting care: High blood pressure, Pain, Sleep and diabetes.  2) Examination of Body Systems comprised of:   Body structures and functions that impact the plan of care:      Shoulder.   Activity limitations that impact the plan of care are:      Bathing, Cooking, Dressing and Lifting.  3) Clinical presentation  characteristics are:   Evolving/Changing.  4) Decision-Making    Moderate complexity using standardized patient assessment instrument and/or measureable assessment of functional outcome.  Cumulative Therapy Evaluation is: Moderate complexity.    Previous and current functional limitations:  (See Goal Flow Sheet for this information)    Short term and Long term goals: (See Goal Flow Sheet for this information)     Communication ability:  Patient appears to be able to clearly communicate and understand verbal and written communication and follow directions correctly.  Treatment Explanation - The following has been discussed with the patient:     RX ordered/plan of care  Anticipated outcomes  Possible risks and side effects  This patient would benefit from PT intervention to resume normal activities.   Rehab potential is good.    Frequency:  1 X week, once daily  Duration:  for 4 weeks, tapering to 2x/month for 1 month  Discharge Plan:  Achieve all LTG.  Independent in home treatment program.  Reach maximal therapeutic benefit.    Please refer to the daily flowsheet for treatment today, total treatment time and time spent performing 1:1 timed codes.

## 2021-11-04 NOTE — PROGRESS NOTES
[facility]    OUTPATIENT Physical Therapy ORTHOPEDIC EVALUATION  PLAN OF TREATMENT FOR OUTPATIENT REHABILITATION  (COMPLETE FOR INITIAL CLAIMS ONLY)  Patient's Last Name, First Name, M.I.  YOB: 1960  Lorrie Blake    Provider s Name:  [facility]   Medical Record No.  [id number]   Start of Care Date:  11/04/21   Onset Date:   10/22/21 (MD referral date)   Type:     _X__PT   ___OT Medical Diagnosis:  No diagnosis found.     Treatment Diagnosis:  L Shoulder Adhesive Capsulitis         Goals:     11/04/21 0500   Body Part   Goals listed below are for Left Shoulder   Goal #1   Goal #1 reaching   Previous Functional Level No restrictions   Current Functional Level Cannot reach ;behind back   STG Target Performance Reach ;behind back   Performance level L3-4   Rationale for independent personal hygiene;for dressing;for bathing   Due date 11/25/21   LTG Target Performance Reach;behind back   Performance Level T12   Rationale for independent personal hygiene;for dressing;for bathing   Due date 12/16/21       Therapy Frequency:  1x/week for 4 weeks tapering to 2x/month for 1 month  Predicted Duration of Therapy Intervention:  2 months    Mamta Dickens, PT                 I CERTIFY THE NEED FOR THESE SERVICES FURNISHED UNDER        THIS PLAN OF TREATMENT AND WHILE UNDER MY CARE .             Physician Signature               Date    X_____________________________________________________                             Certification Date From:  11/04/21   Certification Date To:  01/02/22    Referring Provider:  Imani Blas    Initial Assessment        See Epic Evaluation SOC Date: 11/04/21

## 2021-11-11 ENCOUNTER — THERAPY VISIT (OUTPATIENT)
Dept: PHYSICAL THERAPY | Facility: CLINIC | Age: 61
End: 2021-11-11
Payer: COMMERCIAL

## 2021-11-11 DIAGNOSIS — M75.02 ADHESIVE CAPSULITIS OF LEFT SHOULDER: ICD-10-CM

## 2021-11-11 PROCEDURE — 97110 THERAPEUTIC EXERCISES: CPT | Mod: GP | Performed by: PHYSICAL THERAPIST

## 2021-11-18 ENCOUNTER — THERAPY VISIT (OUTPATIENT)
Dept: PHYSICAL THERAPY | Facility: CLINIC | Age: 61
End: 2021-11-18
Payer: COMMERCIAL

## 2021-11-18 DIAGNOSIS — M75.02 ADHESIVE CAPSULITIS OF LEFT SHOULDER: ICD-10-CM

## 2021-11-18 PROCEDURE — 97110 THERAPEUTIC EXERCISES: CPT | Mod: GP | Performed by: PHYSICAL THERAPIST

## 2021-11-26 ENCOUNTER — THERAPY VISIT (OUTPATIENT)
Dept: PHYSICAL THERAPY | Facility: CLINIC | Age: 61
End: 2021-11-26
Payer: COMMERCIAL

## 2021-11-26 DIAGNOSIS — M75.02 ADHESIVE CAPSULITIS OF LEFT SHOULDER: ICD-10-CM

## 2021-11-26 PROCEDURE — 97110 THERAPEUTIC EXERCISES: CPT | Mod: GP | Performed by: PHYSICAL THERAPIST

## 2021-12-02 ENCOUNTER — THERAPY VISIT (OUTPATIENT)
Dept: PHYSICAL THERAPY | Facility: CLINIC | Age: 61
End: 2021-12-02
Payer: COMMERCIAL

## 2021-12-02 DIAGNOSIS — M75.02 ADHESIVE CAPSULITIS OF LEFT SHOULDER: ICD-10-CM

## 2021-12-02 PROCEDURE — 97112 NEUROMUSCULAR REEDUCATION: CPT | Mod: GP | Performed by: PHYSICAL THERAPIST

## 2021-12-02 PROCEDURE — 97110 THERAPEUTIC EXERCISES: CPT | Mod: GP | Performed by: PHYSICAL THERAPIST

## 2021-12-09 ENCOUNTER — THERAPY VISIT (OUTPATIENT)
Dept: PHYSICAL THERAPY | Facility: CLINIC | Age: 61
End: 2021-12-09
Payer: COMMERCIAL

## 2021-12-09 DIAGNOSIS — M75.02 ADHESIVE CAPSULITIS OF LEFT SHOULDER: ICD-10-CM

## 2021-12-09 PROCEDURE — 97112 NEUROMUSCULAR REEDUCATION: CPT | Mod: GP | Performed by: PHYSICAL THERAPIST

## 2021-12-09 PROCEDURE — 97110 THERAPEUTIC EXERCISES: CPT | Mod: GP | Performed by: PHYSICAL THERAPIST

## 2021-12-23 ENCOUNTER — THERAPY VISIT (OUTPATIENT)
Dept: PHYSICAL THERAPY | Facility: CLINIC | Age: 61
End: 2021-12-23
Payer: COMMERCIAL

## 2021-12-23 DIAGNOSIS — M75.02 ADHESIVE CAPSULITIS OF LEFT SHOULDER: ICD-10-CM

## 2021-12-23 PROCEDURE — 97110 THERAPEUTIC EXERCISES: CPT | Mod: GP | Performed by: PHYSICAL THERAPIST

## 2021-12-23 PROCEDURE — 97140 MANUAL THERAPY 1/> REGIONS: CPT | Mod: GP | Performed by: PHYSICAL THERAPIST

## 2022-01-07 ENCOUNTER — THERAPY VISIT (OUTPATIENT)
Dept: PHYSICAL THERAPY | Facility: CLINIC | Age: 62
End: 2022-01-07
Payer: COMMERCIAL

## 2022-01-07 DIAGNOSIS — M75.02 ADHESIVE CAPSULITIS OF LEFT SHOULDER: ICD-10-CM

## 2022-01-07 PROCEDURE — 97110 THERAPEUTIC EXERCISES: CPT | Mod: GP | Performed by: PHYSICAL THERAPIST

## 2022-01-07 PROCEDURE — 97112 NEUROMUSCULAR REEDUCATION: CPT | Mod: GP | Performed by: PHYSICAL THERAPIST

## 2022-01-07 NOTE — PROGRESS NOTES
[facility]    OUTPATIENT Physical Therapy ORTHOPEDIC EVALUATION  PLAN OF TREATMENT FOR OUTPATIENT REHABILITATION  (COMPLETE FOR INITIAL CLAIMS ONLY)  Patient's Last Name, First Name, M.I.  YOB: 1960  Lorrie Blake    Provider s Name:  [facility]   Medical Record No.  [id number]   Start of Care Date:  11/04/21   Onset Date:   10/22/21 (MD referral date)   Type:     _X__PT   ___OT Medical Diagnosis:    Encounter Diagnosis   Name Primary?     Adhesive capsulitis of left shoulder         Treatment Diagnosis:  L Shoulder Adhesive Capsulitis         Goals:     01/07/22 0500   Body Part   Goals listed below are for Left Shoulder   Goal #1   Goal #1 reaching   Previous Functional Level No restrictions   Current Functional Level Can reach ;behind back   Performance level T10   STG Target Performance Reach ;behind back   Performance level L3-4   Rationale for independent personal hygiene;for dressing;for bathing   Due date 11/25/21   Date Goal Met 11/18/21   LTG Target Performance Reach;behind back   Performance Level T8   Rationale for independent personal hygiene;for dressing;for bathing   Due date 01/13/22       Therapy Frequency:  1x/week for 4 weeks tapering to 2x/month for 1 month  Predicted Duration of Therapy Intervention:  2 months    Mamta Dickens, PT                 I CERTIFY THE NEED FOR THESE SERVICES FURNISHED UNDER        THIS PLAN OF TREATMENT AND WHILE UNDER MY CARE .             Physician Signature               Date    X_____________________________________________________                             Certification Date From:  01/03/22   Certification Date To:  01/10/22    Referring Provider:  Imani Blas PA-C    Initial Assessment        See Epic Evaluation SOC Date: 11/04/21

## 2022-01-07 NOTE — PROGRESS NOTES
Subjective:  HPI  Physical Exam                    Objective:  System    Physical Exam    General     ROS    Assessment/Plan:    DISCHARGE REPORT    Progress reporting period is from 11/04/21 to 1/07/21.       SUBJECTIVE  Patient reports moderate to significant improvement in her left shoulder since the start of therapy.  She is now able to sleep comfortably through the night. Reaching overhead or behind the back remains a little challenging but overall easier to perform.      Current pain level is 1/10  .     Previous pain level was  5/10  .   Changes in function:  Yes (See Goal flowsheet attached for changes in current functional level)  Adverse reaction to treatment or activity: None    OBJECTIVE  *denotes pain  LEFT SHOULDER AROM PROM STRENGTH   Flexion 138 WNL* 5/5   Abduction 130 WNL* 5/5   IR/Ext T10 65* 5/5   ER 60* 85* 5/5           ASSESSMENT/PLAN  STG/LTGs have been met or progress has been made towards goals:  Yes (See Goal flow sheet completed today.)  Assessment of Progress: The patient's condition is improving.  Patient is meeting short term goals and is progressing towards long term goals.  Self Management Plans:  Patient has been instructed in a home exercise program and demonstrates understanding of how to safely progress with strengthening exercises on her own now.  PT intervention is no longer required to meet STG/LTG.    Recommendations:  This patient is ready to be discharged from therapy and continue their home treatment program. Encouraged patient to call if she has any questions or concerns.      Please refer to the daily flowsheet for treatment today, total treatment time and time spent performing 1:1 timed codes.

## 2022-02-17 PROBLEM — K21.9 GASTROESOPHAGEAL REFLUX DISEASE: Status: ACTIVE | Noted: 2019-04-11

## 2022-04-11 ENCOUNTER — TELEPHONE (OUTPATIENT)
Dept: FAMILY MEDICINE | Facility: CLINIC | Age: 62
End: 2022-04-11
Payer: COMMERCIAL

## 2022-04-11 NOTE — LETTER
April 11, 2022      Lorrie Blake  [address] [address]      Your healthcare team cares about your health. To provide you with the best care,   we have reviewed your chart and based on our findings, we see that you are due to:     - CERVICAL CANCER SCREENING: Schedule a Cervical Cancer Screening, with Pap and wellness exam.     If you have already completed these items, please contact the clinic via phone or   Mychart so your care team can review and update your records. Thank you for   choosing [facility] for your healthcare needs. For any questions,   concerns, or to schedule an appointment please contact the clinic.       Healthy Regards,      Your [facility] Team

## 2022-04-11 NOTE — TELEPHONE ENCOUNTER
Patient Quality Outreach    Patient is due for the following:   Colon Cancer Screening -  Colonoscopy  Cervical Cancer Screening - PAP Needed  Physical  - due    NEXT STEPS:   Schedule a yearly physical   Colonoscopy done through [facility] 5/4/21. Information sent to abstracting.     Type of outreach:    Sent letter.      Questions for provider review:    None     Debra Her

## 2022-06-11 ENCOUNTER — HEALTH MAINTENANCE LETTER (OUTPATIENT)
Age: 62
End: 2022-06-11

## 2022-10-13 NOTE — TELEPHONE ENCOUNTER
Patient arrived to the Emergency Department with the following   Chief Complaint    Patient presents with   • Finger injury     Pt reports jamming finger on basketball yesterday, right index finger, denies pain, finger is swollen. Pt had finger wrapped with tape and reported finger was purple, pt removed tape and applied ice, swelling persists but no longer purple. CMS intact   Pharmacy is still sending request for PA on this medication?    Was there an outcome?    Please let the pharmacy know.

## 2022-10-22 ENCOUNTER — HEALTH MAINTENANCE LETTER (OUTPATIENT)
Age: 62
End: 2022-10-22

## 2023-04-01 ENCOUNTER — HEALTH MAINTENANCE LETTER (OUTPATIENT)
Age: 63
End: 2023-04-01

## 2023-06-18 ENCOUNTER — HEALTH MAINTENANCE LETTER (OUTPATIENT)
Age: 63
End: 2023-06-18

## 2023-11-05 ENCOUNTER — HEALTH MAINTENANCE LETTER (OUTPATIENT)
Age: 63
End: 2023-11-05

## 2023-12-19 NOTE — TELEPHONE ENCOUNTER
Patient calling for results of her recent Abdominal and Pelvic CT.     Will route to ordering provider.     Please review and advise.     Evie Austin RN BSN  [facility]     No

## 2024-06-02 ENCOUNTER — HEALTH MAINTENANCE LETTER (OUTPATIENT)
Age: 64
End: 2024-06-02

## 2024-08-11 ENCOUNTER — HEALTH MAINTENANCE LETTER (OUTPATIENT)
Age: 64
End: 2024-08-11

## 2024-11-10 NOTE — MR AVS SNAPSHOT
After Visit Summary   8/28/2017    Lorrie Blake    MRN: [id number]           Patient Information     Date Of Birth          1960        Visit Information        Provider Department      8/28/2017 3:15 PM Froy Washington MD; MG [facility]        Today's Diagnoses     Overweight (BMI 25.0-29.9)    -  1    Type 2 diabetes mellitus with mild nonproliferative diabetic retinopathy without macular edema (H)          Care Instructions      Sending blood sugars to your provider at [facility]:  We want to help you with your diabetes management, which often requires frequent adjustments to your therapy. For your convenience, we have several ways to send your blood sugars to your doctor for review.    - Send message directly to your doctor through My Chart.  Please ask the rooming staff if you would like to sign up for My Chart.  This is a fast and confidential way to send your information and communicate directly with your provider.   - Record readings and fax to [phone].  We have a template for you to use for your convenience.  - If you have a [employer] pump, upload to [employer] and notify your provider of your username and password.   - Stop by the clinic with your meter for download.   - My Chart or call Kinjal Irving, Diabetes Educator at [phone]  - Call the clinic and speak to one of the endocrine nurses to relay information on the telephone.  Miriam Gonzales or Kiki at [phone].   -    Please call the on-call Endocrinologist at the [place] for after       hours/weekend needs at [phone] Option #4.    Please note that you do not need to FAST if you are just having an A1C drawn. Please remember to ALWAYS bring your glucose meter with to your appointment. This data is very important for the management of your care.    Thank you!  Your [facility] Team                Follow-ups after your visit        Your next 10 appointments  already scheduled     Oct 31, 2017  3:00 PM CDT   [facility] with [facility] Cde Provider   [facility]    [id number] [place] [id number] [phone]            Jan 22, 2018  3:15 PM CST   Return Visit with Froy Washington MD, MG ENDO NURSE   [facility]    [address] [place] [id number] [phone]              Future tests that were ordered for you today     Open Future Orders        Priority Expected Expires Ordered    Albumin Random Urine Quantitative with Creat Ratio Routine 8/28/2017 8/28/2018 8/28/2017    Creatinine Routine 8/28/2017 8/28/2018 8/28/2017    Lipid Profile Routine 8/28/2017 8/28/2018 8/28/2017            Who to contact     If you have questions or need follow up information about today's clinic visit or your schedule please contact [facility] directly at [phone].  Normal or non-critical lab and imaging results will be communicated to you by [employer], letter or phone within 4 business days after the clinic has received the results. If you do not hear from us within 7 days, please contact the clinic through [employer] or phone. If you have a critical or abnormal lab result, we will notify you by phone as soon as possible.  Submit refill requests through [employer] or call your pharmacy and they will forward the refill request to us. Please allow 3 business days for your refill to be completed.          Additional Information About Your Visit        [employer] Information     [employer] is an electronic gateway that provides easy, online access to your medical records. With [employer], you can request a clinic appointment, read your test results, renew a prescription or communicate with your care team.     To sign up for [employer] visit the website at [email or web address]   You will be asked to enter the access code listed below, as well as some personal  "information. Please follow the directions to create your username and password.     Your access code is: [email or web address]  Expires: 2017  4:11 PM     Your access code will  in 90 days. If you need help or a new code, please contact your [facility] or call [phone] for assistance.        Care EveryWhere ID     This is your Care EveryWhere ID. This could be used by other organizations to access your [facility] medical records  [id number]        Your Vitals Were     Pulse Height BMI (Body Mass Index)             97 1.53 m (5' 0.25\") 25.22 kg/m2          Blood Pressure from Last 3 Encounters:   17 (!) 147/104   17 137/89   16 111/79    Weight from Last 3 Encounters:   17 59.1 kg (130 lb 3.2 oz)   17 58.9 kg (129 lb 13.6 oz)   16 58.6 kg (129 lb 3 oz)              We Performed the Following     Hemoglobin A1c POCT          Today's Medication Changes          These changes are accurate as of: 17  4:11 PM.  If you have any questions, ask your nurse or doctor.               Start taking these medicines.        Dose/Directions    glipiZIDE 10 MG 24 hr tablet   Commonly known as:  GLUCOTROL XL   Used for:  Type 2 diabetes mellitus with mild nonproliferative diabetic retinopathy without macular edema (H), Overweight (BMI 25.0-29.9)   Started by:  Froy Washington MD        Dose:  10 mg   Take 1 tablet (10 mg) by mouth daily   Quantity:  90 tablet   Refills:  3         These medicines have changed or have updated prescriptions.        Dose/Directions    topiramate 100 MG tablet   Commonly known as:  TOPAMAX   This may have changed:    - medication strength  - how much to take  - how to take this  - when to take this  - additional instructions   Used for:  Overweight (BMI 25.0-29.9), Type 2 diabetes mellitus with mild nonproliferative diabetic retinopathy without macular edema (H)   Changed by:  Froy Washington MD        Dose:  100 " mg   Take 1 tablet (100 mg) by mouth daily   Quantity:  90 tablet   Refills:  2         Stop taking these medicines if you haven't already. Please contact your care team if you have questions.     phentermine 37.5 MG tablet   Commonly known as:  ADIPEX-P   Stopped by:  Froy Washington MD                Where to get your medicines      These medications were sent to [id number] - [place], [place] - [address] AT [facility] ( [facility])  [address], [place] [address]     Phone:  [phone]     glipiZIDE 10 MG 24 hr tablet    topiramate 100 MG tablet                Primary Care Provider    [facility]       No address on file        Equal Access to Services     DONNA WU AH: Sonam Lares, serena hairston, qacyril kaalmamatt betancur, jose roberto max. So [facility] [phone].    ATENCIÓN: Si habla español, tiene a thornton disposición servicios gratuitos de asistencia lingüística. Llame al [phone].    We comply with applicable federal civil rights laws and [place] laws. We do not discriminate on the basis of race, color, national origin, age, disability sex, sexual orientation or gender identity.            Thank you!     Thank you for choosing [facility]  for your care. Our goal is always to provide you with excellent care. Hearing back from our patients is one way we can continue to improve our services. Please take a few minutes to complete the written survey that you may receive in the mail after your visit with us. Thank you!             Your Updated Medication List - Protect others around you: Learn how to safely use, store and throw away your medicines at www.disposemymeds.org.          This list is accurate as of: 8/28/17  4:11 PM.  Always use your most recent med list.                   Brand Name Dispense Instructions for use Diagnosis    ASPIRIN EC PO      Take 81 mg by mouth daily         BIOTIN PO      Take by mouth daily    Type 2 diabetes mellitus with mild nonproliferative diabetic retinopathy without macular edema (H)       blood glucose monitoring test strip    no brand specified    100 strip    Use to test blood sugars 3 times daily or as directed. Accu-chek smartview test strips (has Marisela meter)    Type 2 diabetes mellitus (H)       dapagliflozin 10 MG Tabs tablet    FARXIGA    90 tablet    Take one tablet by mouth daily    Type 2 diabetes mellitus with mild nonproliferative diabetic retinopathy without macular edema (H)       glipiZIDE 10 MG 24 hr tablet    GLUCOTROL XL    90 tablet    Take 1 tablet (10 mg) by mouth daily    Type 2 diabetes mellitus with mild nonproliferative diabetic retinopathy without macular edema (H), Overweight (BMI 25.0-29.9)       LINZESS 290 MCG capsule   Generic drug:  linaclotide      Take 290 mcg by mouth every morning (before breakfast)        melatonin 3 MG tablet      Take 3 mg by mouth nightly as needed for sleep        METAMUCIL PO      Take by mouth daily    Type 2 diabetes mellitus with mild nonproliferative diabetic retinopathy without macular edema (H), Overweight (BMI 25.0-29.9)       metFORMIN 500 MG tablet    GLUCOPHAGE    360 tablet    2 tablets with breakfast and 2 tablet with supper    Type 2 diabetes mellitus with hyperglycemia (H)       MULTIVITAMINS PO      Take by mouth daily    Type 2 diabetes mellitus with mild nonproliferative diabetic retinopathy without macular edema (H), Overweight (BMI 25.0-29.9)       OMEPRAZOLE PO      Take 40 mg by mouth every morning        PROBIOTIC DAILY PO      Take by mouth daily    Type 2 diabetes mellitus with mild nonproliferative diabetic retinopathy without macular edema (H)       topiramate 100 MG tablet    TOPAMAX    90 tablet    Take 1 tablet (100 mg) by mouth daily    Overweight (BMI 25.0-29.9), Type 2 diabetes mellitus with mild nonproliferative diabetic retinopathy without macular edema (H)     show

## 2024-12-22 ENCOUNTER — HEALTH MAINTENANCE LETTER (OUTPATIENT)
Age: 64
End: 2024-12-22

## (undated) RX ORDER — LIDOCAINE HYDROCHLORIDE 10 MG/ML
INJECTION, SOLUTION EPIDURAL; INFILTRATION; INTRACAUDAL; PERINEURAL
Status: DISPENSED
Start: 2021-03-22

## (undated) RX ORDER — PROPOFOL 10 MG/ML
INJECTION, EMULSION INTRAVENOUS
Status: DISPENSED
Start: 2021-03-22